# Patient Record
Sex: MALE | Race: WHITE | Employment: FULL TIME | URBAN - METROPOLITAN AREA
[De-identification: names, ages, dates, MRNs, and addresses within clinical notes are randomized per-mention and may not be internally consistent; named-entity substitution may affect disease eponyms.]

---

## 2022-02-06 ENCOUNTER — HOSPITAL ENCOUNTER (INPATIENT)
Age: 47
LOS: 1 days | Discharge: ANOTHER HOSPITAL | DRG: 432 | End: 2022-02-07
Attending: STUDENT IN AN ORGANIZED HEALTH CARE EDUCATION/TRAINING PROGRAM | Admitting: INTERNAL MEDICINE
Payer: COMMERCIAL

## 2022-02-06 DIAGNOSIS — K92.2 GASTROINTESTINAL HEMORRHAGE, UNSPECIFIED GASTROINTESTINAL HEMORRHAGE TYPE: Primary | ICD-10-CM

## 2022-02-06 DIAGNOSIS — K70.30 ALCOHOLIC CIRRHOSIS, UNSPECIFIED WHETHER ASCITES PRESENT (HCC): ICD-10-CM

## 2022-02-06 DIAGNOSIS — R57.8 HEMORRHAGIC SHOCK (HCC): ICD-10-CM

## 2022-02-06 DIAGNOSIS — I85.11 ESOPHAGEAL VARICES WITH BLEEDING IN DISEASES CLASSIFIED ELSEWHERE (HCC): ICD-10-CM

## 2022-02-06 DIAGNOSIS — J98.59 MEDIASTINAL MASS: ICD-10-CM

## 2022-02-06 PROCEDURE — 96376 TX/PRO/DX INJ SAME DRUG ADON: CPT

## 2022-02-06 PROCEDURE — 99283 EMERGENCY DEPT VISIT LOW MDM: CPT

## 2022-02-06 PROCEDURE — 96375 TX/PRO/DX INJ NEW DRUG ADDON: CPT

## 2022-02-06 PROCEDURE — 02HV33Z INSERTION OF INFUSION DEVICE INTO SUPERIOR VENA CAVA, PERCUTANEOUS APPROACH: ICD-10-PCS | Performed by: INTERNAL MEDICINE

## 2022-02-06 PROCEDURE — 96361 HYDRATE IV INFUSION ADD-ON: CPT

## 2022-02-06 PROCEDURE — 96374 THER/PROPH/DIAG INJ IV PUSH: CPT

## 2022-02-06 RX ORDER — ONDANSETRON 2 MG/ML
4 INJECTION INTRAMUSCULAR; INTRAVENOUS ONCE
Status: COMPLETED | OUTPATIENT
Start: 2022-02-07 | End: 2022-02-07

## 2022-02-06 RX ORDER — 0.9 % SODIUM CHLORIDE 0.9 %
1000 INTRAVENOUS SOLUTION INTRAVENOUS ONCE
Status: COMPLETED | OUTPATIENT
Start: 2022-02-07 | End: 2022-02-07

## 2022-02-06 RX ORDER — FENTANYL CITRATE 50 UG/ML
50 INJECTION, SOLUTION INTRAMUSCULAR; INTRAVENOUS ONCE
Status: COMPLETED | OUTPATIENT
Start: 2022-02-07 | End: 2022-02-07

## 2022-02-06 ASSESSMENT — PAIN DESCRIPTION - LOCATION: LOCATION: ABDOMEN

## 2022-02-06 ASSESSMENT — PAIN DESCRIPTION - PAIN TYPE: TYPE: ACUTE PAIN

## 2022-02-06 ASSESSMENT — PAIN SCALES - GENERAL: PAINLEVEL_OUTOF10: 8

## 2022-02-06 ASSESSMENT — PAIN DESCRIPTION - ORIENTATION: ORIENTATION: LOWER

## 2022-02-06 NOTE — Clinical Note
Patient Class: Inpatient [101]   REQUIRED: Diagnosis: GI bleed [[id number]]   Estimated Length of Stay: Estimated stay of more than 2 midnights   Admitting Provider: Stan Crow [[id number]]   Telemetry/Cardiac Monitoring Required?: Yes

## 2022-02-07 ENCOUNTER — ANESTHESIA EVENT (OUTPATIENT)
Dept: OPERATING ROOM | Age: 47
DRG: 432 | End: 2022-02-07
Payer: COMMERCIAL

## 2022-02-07 ENCOUNTER — ANESTHESIA (OUTPATIENT)
Dept: INTERVENTIONAL RADIOLOGY/VASCULAR | Age: 47
DRG: 405 | End: 2022-02-07
Payer: COMMERCIAL

## 2022-02-07 ENCOUNTER — ANESTHESIA (OUTPATIENT)
Dept: OPERATING ROOM | Age: 47
DRG: 432 | End: 2022-02-07
Payer: COMMERCIAL

## 2022-02-07 ENCOUNTER — APPOINTMENT (OUTPATIENT)
Dept: GENERAL RADIOLOGY | Age: 47
DRG: 432 | End: 2022-02-07
Payer: COMMERCIAL

## 2022-02-07 ENCOUNTER — HOSPITAL ENCOUNTER (INPATIENT)
Age: 47
LOS: 15 days | Discharge: HOME HEALTH | DRG: 405 | End: 2022-02-22
Attending: INTERNAL MEDICINE | Admitting: INTERNAL MEDICINE
Payer: COMMERCIAL

## 2022-02-07 ENCOUNTER — APPOINTMENT (OUTPATIENT)
Dept: CT IMAGING | Age: 47
DRG: 432 | End: 2022-02-07
Payer: COMMERCIAL

## 2022-02-07 ENCOUNTER — ANESTHESIA EVENT (OUTPATIENT)
Dept: INTERVENTIONAL RADIOLOGY/VASCULAR | Age: 47
DRG: 405 | End: 2022-02-07
Payer: COMMERCIAL

## 2022-02-07 ENCOUNTER — APPOINTMENT (OUTPATIENT)
Dept: CT IMAGING | Age: 47
DRG: 405 | End: 2022-02-07
Attending: INTERNAL MEDICINE
Payer: COMMERCIAL

## 2022-02-07 ENCOUNTER — APPOINTMENT (OUTPATIENT)
Dept: INTERVENTIONAL RADIOLOGY/VASCULAR | Age: 47
DRG: 405 | End: 2022-02-07
Attending: INTERNAL MEDICINE
Payer: COMMERCIAL

## 2022-02-07 VITALS
TEMPERATURE: 99 F | OXYGEN SATURATION: 100 % | HEIGHT: 68 IN | BODY MASS INDEX: 36.37 KG/M2 | RESPIRATION RATE: 20 BRPM | HEART RATE: 130 BPM | SYSTOLIC BLOOD PRESSURE: 180 MMHG | WEIGHT: 240 LBS | DIASTOLIC BLOOD PRESSURE: 88 MMHG

## 2022-02-07 VITALS
OXYGEN SATURATION: 100 % | RESPIRATION RATE: 12 BRPM | SYSTOLIC BLOOD PRESSURE: 127 MMHG | DIASTOLIC BLOOD PRESSURE: 62 MMHG

## 2022-02-07 VITALS — OXYGEN SATURATION: 99 % | TEMPERATURE: 98.4 F

## 2022-02-07 DIAGNOSIS — I85.01 BLEEDING ESOPHAGEAL VARICES, UNSPECIFIED ESOPHAGEAL VARICES TYPE (HCC): Primary | ICD-10-CM

## 2022-02-07 PROBLEM — R91.8 PULMONARY MASS: Status: ACTIVE | Noted: 2022-02-07

## 2022-02-07 PROBLEM — K92.0 HEMATEMESIS: Status: ACTIVE | Noted: 2022-02-07

## 2022-02-07 PROBLEM — K92.2 GI BLEED: Status: ACTIVE | Noted: 2022-02-07

## 2022-02-07 PROBLEM — F10.10 ALCOHOL ABUSE: Status: ACTIVE | Noted: 2022-02-07

## 2022-02-07 PROBLEM — D62 ACUTE BLOOD LOSS ANEMIA: Status: ACTIVE | Noted: 2022-02-07

## 2022-02-07 PROBLEM — R74.01 TRANSAMINITIS: Status: ACTIVE | Noted: 2022-02-07

## 2022-02-07 PROBLEM — E87.5 HYPERKALEMIA: Status: ACTIVE | Noted: 2022-02-07

## 2022-02-07 PROBLEM — E66.01 MORBID OBESITY WITH BMI OF 40.0-44.9, ADULT (HCC): Status: ACTIVE | Noted: 2022-02-07

## 2022-02-07 LAB
AADO2: 205 MMHG
AADO2: 224.2 MMHG
ABO/RH: NORMAL
ACANTHOCYTES: ABNORMAL
ALBUMIN SERPL-MCNC: 2.3 G/DL (ref 3.5–5.2)
ALBUMIN SERPL-MCNC: 2.5 G/DL (ref 3.5–5.2)
ALBUMIN SERPL-MCNC: 2.8 G/DL (ref 3.5–5.2)
ALBUMIN SERPL-MCNC: 2.9 G/DL (ref 3.5–5.2)
ALP BLD-CCNC: 127 U/L (ref 40–129)
ALP BLD-CCNC: 131 U/L (ref 40–129)
ALP BLD-CCNC: 161 U/L (ref 40–129)
ALP BLD-CCNC: 173 U/L (ref 40–129)
ALT SERPL-CCNC: 202 U/L (ref 0–40)
ALT SERPL-CCNC: 451 U/L (ref 0–40)
ALT SERPL-CCNC: 479 U/L (ref 0–40)
ALT SERPL-CCNC: 63 U/L (ref 0–40)
ANGLE (CLOT STRENGTH): 76 DEGREE (ref 59–74)
ANION GAP SERPL CALCULATED.3IONS-SCNC: 10 MMOL/L (ref 7–16)
ANION GAP SERPL CALCULATED.3IONS-SCNC: 10 MMOL/L (ref 7–16)
ANION GAP SERPL CALCULATED.3IONS-SCNC: 12 MMOL/L (ref 7–16)
ANION GAP SERPL CALCULATED.3IONS-SCNC: 16 MMOL/L (ref 7–16)
ANION GAP SERPL CALCULATED.3IONS-SCNC: 8 MMOL/L (ref 7–16)
ANISOCYTOSIS: ABNORMAL
ANISOCYTOSIS: ABNORMAL
ANTIBODY SCREEN: NORMAL
ANTIBODY SCREEN: NORMAL
APTT: 30.4 SEC (ref 24.5–35.1)
APTT: 30.8 SEC (ref 24.5–35.1)
AST SERPL-CCNC: 1680 U/L (ref 0–39)
AST SERPL-CCNC: 1782 U/L (ref 0–39)
AST SERPL-CCNC: 239 U/L (ref 0–39)
AST SERPL-CCNC: 672 U/L (ref 0–39)
B.E.: -4.8 MMOL/L (ref -3–3)
B.E.: 4.5 MMOL/L (ref -3–3)
BASOPHILS ABSOLUTE: 0.01 E9/L (ref 0–0.2)
BASOPHILS ABSOLUTE: 0.03 E9/L (ref 0–0.2)
BASOPHILS ABSOLUTE: 0.09 E9/L (ref 0–0.2)
BASOPHILS RELATIVE PERCENT: 0.1 % (ref 0–2)
BASOPHILS RELATIVE PERCENT: 0.3 % (ref 0–2)
BASOPHILS RELATIVE PERCENT: 1.3 % (ref 0–2)
BILIRUB SERPL-MCNC: 2.5 MG/DL (ref 0–1.2)
BILIRUB SERPL-MCNC: 3 MG/DL (ref 0–1.2)
BILIRUB SERPL-MCNC: 3.1 MG/DL (ref 0–1.2)
BILIRUB SERPL-MCNC: 3.1 MG/DL (ref 0–1.2)
BILIRUBIN DIRECT: 1.7 MG/DL (ref 0–0.3)
BILIRUBIN, INDIRECT: 0.8 MG/DL (ref 0–1)
BLOOD BANK DISPENSE STATUS: NORMAL
BLOOD BANK PRODUCT CODE: NORMAL
BPU ID: NORMAL
BUN BLDV-MCNC: 10 MG/DL (ref 6–20)
BUN BLDV-MCNC: 11 MG/DL (ref 6–20)
BUN BLDV-MCNC: 16 MG/DL (ref 6–20)
BUN BLDV-MCNC: 7 MG/DL (ref 6–20)
BUN BLDV-MCNC: 9 MG/DL (ref 6–20)
CALCIUM IONIZED: 1.04 MMOL/L (ref 1.15–1.33)
CALCIUM IONIZED: 1.15 MMOL/L (ref 1.15–1.33)
CALCIUM IONIZED: 1.17 MMOL/L (ref 1.15–1.33)
CALCIUM SERPL-MCNC: 7.4 MG/DL (ref 8.6–10.2)
CALCIUM SERPL-MCNC: 7.8 MG/DL (ref 8.6–10.2)
CALCIUM SERPL-MCNC: 8 MG/DL (ref 8.6–10.2)
CALCIUM SERPL-MCNC: 8.2 MG/DL (ref 8.6–10.2)
CALCIUM SERPL-MCNC: 8.3 MG/DL (ref 8.6–10.2)
CHLORIDE BLD-SCNC: 100 MMOL/L (ref 98–107)
CHLORIDE BLD-SCNC: 104 MMOL/L (ref 98–107)
CHLORIDE BLD-SCNC: 105 MMOL/L (ref 98–107)
CHLORIDE BLD-SCNC: 106 MMOL/L (ref 98–107)
CHLORIDE BLD-SCNC: 106 MMOL/L (ref 98–107)
CO2: 19 MMOL/L (ref 22–29)
CO2: 20 MMOL/L (ref 22–29)
CO2: 23 MMOL/L (ref 22–29)
CO2: 23 MMOL/L (ref 22–29)
CO2: 26 MMOL/L (ref 22–29)
COHB: 0.3 % (ref 0–1.5)
COHB: 0.6 % (ref 0–1.5)
CREAT SERPL-MCNC: 0.8 MG/DL (ref 0.7–1.2)
CREAT SERPL-MCNC: 0.8 MG/DL (ref 0.7–1.2)
CREAT SERPL-MCNC: 0.9 MG/DL (ref 0.7–1.2)
CREAT SERPL-MCNC: 0.9 MG/DL (ref 0.7–1.2)
CREAT SERPL-MCNC: 1 MG/DL (ref 0.7–1.2)
CRITICAL: ABNORMAL
CRITICAL: ABNORMAL
DATE ANALYZED: ABNORMAL
DATE ANALYZED: ABNORMAL
DATE OF COLLECTION: ABNORMAL
DATE OF COLLECTION: ABNORMAL
DESCRIPTION BLOOD BANK: NORMAL
EKG ATRIAL RATE: 115 BPM
EKG P AXIS: 50 DEGREES
EKG P-R INTERVAL: 152 MS
EKG Q-T INTERVAL: 332 MS
EKG QRS DURATION: 84 MS
EKG QTC CALCULATION (BAZETT): 459 MS
EKG R AXIS: 8 DEGREES
EKG T AXIS: -33 DEGREES
EKG VENTRICULAR RATE: 115 BPM
EOSINOPHILS ABSOLUTE: 0 E9/L (ref 0.05–0.5)
EOSINOPHILS ABSOLUTE: 0 E9/L (ref 0.05–0.5)
EOSINOPHILS ABSOLUTE: 0.07 E9/L (ref 0.05–0.5)
EOSINOPHILS RELATIVE PERCENT: 0 % (ref 0–6)
EOSINOPHILS RELATIVE PERCENT: 0 % (ref 0–6)
EOSINOPHILS RELATIVE PERCENT: 1 % (ref 0–6)
EPL-TEG: 0 % (ref 0–15)
FIO2: 50 %
FIO2: 50 %
G-TEG: 9.9 K D/SC (ref 4.5–11)
GFR AFRICAN AMERICAN: >60
GFR NON-AFRICAN AMERICAN: >60 ML/MIN/1.73
GLUCOSE BLD-MCNC: 127 MG/DL (ref 74–99)
GLUCOSE BLD-MCNC: 145 MG/DL (ref 74–99)
GLUCOSE BLD-MCNC: 145 MG/DL (ref 74–99)
GLUCOSE BLD-MCNC: 158 MG/DL (ref 74–99)
GLUCOSE BLD-MCNC: 174 MG/DL (ref 74–99)
HCO3: 21 MMOL/L (ref 22–26)
HCO3: 26.1 MMOL/L (ref 22–26)
HCT VFR BLD CALC: 27.7 % (ref 37–54)
HCT VFR BLD CALC: 29.2 % (ref 37–54)
HCT VFR BLD CALC: 29.8 % (ref 37–54)
HCT VFR BLD CALC: 30.8 % (ref 37–54)
HCT VFR BLD CALC: 35.7 % (ref 37–54)
HEMOGLOBIN: 10.2 G/DL (ref 12.5–16.5)
HEMOGLOBIN: 11.6 G/DL (ref 12.5–16.5)
HEMOGLOBIN: 9.2 G/DL (ref 12.5–16.5)
HEMOGLOBIN: 9.8 G/DL (ref 12.5–16.5)
HEMOGLOBIN: 9.9 G/DL (ref 12.5–16.5)
HHB: 3.4 % (ref 0–5)
HHB: 3.8 % (ref 0–5)
HYPOCHROMIA: ABNORMAL
IMMATURE GRANULOCYTES #: 0.04 E9/L
IMMATURE GRANULOCYTES #: 0.05 E9/L
IMMATURE GRANULOCYTES #: 0.06 E9/L
IMMATURE GRANULOCYTES %: 0.6 % (ref 0–5)
INR BLD: 1.5
INR BLD: 1.6
K (CLOTTING TIME): 0.9 MIN (ref 1–3)
LAB: ABNORMAL
LAB: ABNORMAL
LACTIC ACID: 3.2 MMOL/L (ref 0.5–2.2)
LACTIC ACID: 4.2 MMOL/L (ref 0.5–2.2)
LACTIC ACID: 4.3 MMOL/L (ref 0.5–2.2)
LACTIC ACID: 5.6 MMOL/L (ref 0.5–2.2)
LACTIC ACID: 7 MMOL/L (ref 0.5–2.2)
LIPASE: 94 U/L (ref 13–60)
LY30 (FIBRINOLYSIS): 0 % (ref 0–8)
LYMPHOCYTES ABSOLUTE: 0.29 E9/L (ref 1.5–4)
LYMPHOCYTES ABSOLUTE: 0.31 E9/L (ref 1.5–4)
LYMPHOCYTES ABSOLUTE: 1.04 E9/L (ref 1.5–4)
LYMPHOCYTES RELATIVE PERCENT: 14.8 % (ref 20–42)
LYMPHOCYTES RELATIVE PERCENT: 3.2 % (ref 20–42)
LYMPHOCYTES RELATIVE PERCENT: 3.4 % (ref 20–42)
Lab: ABNORMAL
Lab: ABNORMAL
MA (MAX AMPLITUDE): 66.3 MM (ref 50–70)
MAGNESIUM: 1.7 MG/DL (ref 1.6–2.6)
MAGNESIUM: 1.8 MG/DL (ref 1.6–2.6)
MCH RBC QN AUTO: 30.3 PG (ref 26–35)
MCH RBC QN AUTO: 30.4 PG (ref 26–35)
MCH RBC QN AUTO: 30.4 PG (ref 26–35)
MCH RBC QN AUTO: 30.5 PG (ref 26–35)
MCH RBC QN AUTO: 30.9 PG (ref 26–35)
MCHC RBC AUTO-ENTMCNC: 32.5 % (ref 32–34.5)
MCHC RBC AUTO-ENTMCNC: 32.9 % (ref 32–34.5)
MCHC RBC AUTO-ENTMCNC: 33.1 % (ref 32–34.5)
MCHC RBC AUTO-ENTMCNC: 33.2 % (ref 32–34.5)
MCHC RBC AUTO-ENTMCNC: 33.9 % (ref 32–34.5)
MCV RBC AUTO: 91.1 FL (ref 80–99.9)
MCV RBC AUTO: 91.3 FL (ref 80–99.9)
MCV RBC AUTO: 91.7 FL (ref 80–99.9)
MCV RBC AUTO: 92.8 FL (ref 80–99.9)
MCV RBC AUTO: 93.5 FL (ref 80–99.9)
METHB: 0.4 % (ref 0–1.5)
METHB: 0.4 % (ref 0–1.5)
MODE: AC
MODE: AC
MONOCYTES ABSOLUTE: 0.27 E9/L (ref 0.1–0.95)
MONOCYTES ABSOLUTE: 0.61 E9/L (ref 0.1–0.95)
MONOCYTES ABSOLUTE: 0.73 E9/L (ref 0.1–0.95)
MONOCYTES RELATIVE PERCENT: 2.9 % (ref 2–12)
MONOCYTES RELATIVE PERCENT: 8 % (ref 2–12)
MONOCYTES RELATIVE PERCENT: 8.7 % (ref 2–12)
NEUTROPHILS ABSOLUTE: 5.16 E9/L (ref 1.8–7.3)
NEUTROPHILS ABSOLUTE: 7.99 E9/L (ref 1.8–7.3)
NEUTROPHILS ABSOLUTE: 8.57 E9/L (ref 1.8–7.3)
NEUTROPHILS RELATIVE PERCENT: 73.6 % (ref 43–80)
NEUTROPHILS RELATIVE PERCENT: 88.1 % (ref 43–80)
NEUTROPHILS RELATIVE PERCENT: 92.8 % (ref 43–80)
O2 SATURATION: 96.2 % (ref 92–98.5)
O2 SATURATION: 96.6 % (ref 92–98.5)
O2HB: 95.2 % (ref 94–97)
O2HB: 95.9 % (ref 94–97)
OPERATOR ID: 2863
OPERATOR ID: 7221
OVALOCYTES: ABNORMAL
PATIENT TEMP: 37 C
PATIENT TEMP: 37 C
PCO2: 28.6 MMHG (ref 35–45)
PCO2: 41.7 MMHG (ref 35–45)
PDW BLD-RTO: 17.6 FL (ref 11.5–15)
PDW BLD-RTO: 17.7 FL (ref 11.5–15)
PDW BLD-RTO: 18 FL (ref 11.5–15)
PDW BLD-RTO: 18.2 FL (ref 11.5–15)
PDW BLD-RTO: 18.3 FL (ref 11.5–15)
PEEP/CPAP: 8 CMH2O
PEEP/CPAP: 8 CMH2O
PFO2: 1.75 MMHG/%
PFO2: 1.84 MMHG/%
PH BLOOD GAS: 7.32 (ref 7.35–7.45)
PH BLOOD GAS: 7.58 (ref 7.35–7.45)
PHOSPHORUS: 2.6 MG/DL (ref 2.5–4.5)
PHOSPHORUS: 4.6 MG/DL (ref 2.5–4.5)
PLATELET # BLD: 128 E9/L (ref 130–450)
PLATELET # BLD: 141 E9/L (ref 130–450)
PLATELET # BLD: 145 E9/L (ref 130–450)
PLATELET # BLD: 148 E9/L (ref 130–450)
PLATELET # BLD: 161 E9/L (ref 130–450)
PMV BLD AUTO: 10.6 FL (ref 7–12)
PMV BLD AUTO: 11.2 FL (ref 7–12)
PMV BLD AUTO: 11.4 FL (ref 7–12)
PMV BLD AUTO: 11.5 FL (ref 7–12)
PMV BLD AUTO: 11.7 FL (ref 7–12)
PO2: 87.6 MMHG (ref 75–100)
PO2: 92.1 MMHG (ref 75–100)
POIKILOCYTES: ABNORMAL
POIKILOCYTES: ABNORMAL
POLYCHROMASIA: ABNORMAL
POLYCHROMASIA: ABNORMAL
POTASSIUM REFLEX MAGNESIUM: 3.7 MMOL/L (ref 3.5–5)
POTASSIUM REFLEX MAGNESIUM: 5.4 MMOL/L (ref 3.5–5)
POTASSIUM SERPL-SCNC: 4.7 MMOL/L (ref 3.5–5)
POTASSIUM SERPL-SCNC: 5.2 MMOL/L (ref 3.5–5)
POTASSIUM SERPL-SCNC: 6.9 MMOL/L (ref 3.5–5)
POTASSIUM SERPL-SCNC: 7 MMOL/L (ref 3.5–5)
PRO-BNP: 75 PG/ML (ref 0–125)
PROTHROMBIN TIME: 17.2 SEC (ref 9.3–12.4)
PROTHROMBIN TIME: 17.3 SEC (ref 9.3–12.4)
R (REACTION TIME): 3.9 MIN (ref 5–10)
RBC # BLD: 3.04 E12/L (ref 3.8–5.8)
RBC # BLD: 3.2 E12/L (ref 3.8–5.8)
RBC # BLD: 3.21 E12/L (ref 3.8–5.8)
RBC # BLD: 3.36 E12/L (ref 3.8–5.8)
RBC # BLD: 3.82 E12/L (ref 3.8–5.8)
RI(T): 2.23
RI(T): 2.56
RR MECHANICAL: 22 B/MIN
RR MECHANICAL: 22 B/MIN
SARS-COV-2, NAAT: ABNORMAL
SARS-COV-2, NAAT: NOT DETECTED
SARS-COV-2, NAAT: NOT DETECTED
SODIUM BLD-SCNC: 135 MMOL/L (ref 132–146)
SODIUM BLD-SCNC: 136 MMOL/L (ref 132–146)
SODIUM BLD-SCNC: 137 MMOL/L (ref 132–146)
SODIUM BLD-SCNC: 139 MMOL/L (ref 132–146)
SODIUM BLD-SCNC: 141 MMOL/L (ref 132–146)
SOURCE, BLOOD GAS: ABNORMAL
SOURCE, BLOOD GAS: ABNORMAL
TARGET CELLS: ABNORMAL
TARGET CELLS: ABNORMAL
TEAR DROP CELLS: ABNORMAL
THB: 12 G/DL (ref 11.5–16.5)
THB: 9.9 G/DL (ref 11.5–16.5)
TIME ANALYZED: 2335
TIME ANALYZED: 946
TOTAL PROTEIN: 6.4 G/DL (ref 6.4–8.3)
TOTAL PROTEIN: 6.8 G/DL (ref 6.4–8.3)
TOTAL PROTEIN: 7.6 G/DL (ref 6.4–8.3)
TOTAL PROTEIN: 7.6 G/DL (ref 6.4–8.3)
TROPONIN, HIGH SENSITIVITY: 16 NG/L (ref 0–11)
VT MECHANICAL: 500 ML
VT MECHANICAL: 500 ML
WBC # BLD: 7 E9/L (ref 4.5–11.5)
WBC # BLD: 9.1 E9/L (ref 4.5–11.5)
WBC # BLD: 9.2 E9/L (ref 4.5–11.5)
WBC # BLD: 9.2 E9/L (ref 4.5–11.5)
WBC # BLD: 9.6 E9/L (ref 4.5–11.5)

## 2022-02-07 PROCEDURE — 6360000004 HC RX CONTRAST MEDICATION: Performed by: RADIOLOGY

## 2022-02-07 PROCEDURE — 85610 PROTHROMBIN TIME: CPT

## 2022-02-07 PROCEDURE — 2580000003 HC RX 258: Performed by: STUDENT IN AN ORGANIZED HEALTH CARE EDUCATION/TRAINING PROGRAM

## 2022-02-07 PROCEDURE — 36415 COLL VENOUS BLD VENIPUNCTURE: CPT

## 2022-02-07 PROCEDURE — 82805 BLOOD GASES W/O2 SATURATION: CPT

## 2022-02-07 PROCEDURE — 86900 BLOOD TYPING SEROLOGIC ABO: CPT

## 2022-02-07 PROCEDURE — 2500000003 HC RX 250 WO HCPCS: Performed by: NURSE ANESTHETIST, CERTIFIED REGISTERED

## 2022-02-07 PROCEDURE — 87081 CULTURE SCREEN ONLY: CPT

## 2022-02-07 PROCEDURE — 6360000002 HC RX W HCPCS: Performed by: STUDENT IN AN ORGANIZED HEALTH CARE EDUCATION/TRAINING PROGRAM

## 2022-02-07 PROCEDURE — 2500000003 HC RX 250 WO HCPCS: Performed by: STUDENT IN AN ORGANIZED HEALTH CARE EDUCATION/TRAINING PROGRAM

## 2022-02-07 PROCEDURE — P9059 PLASMA, FRZ BETWEEN 8-24HOUR: HCPCS

## 2022-02-07 PROCEDURE — 86850 RBC ANTIBODY SCREEN: CPT

## 2022-02-07 PROCEDURE — 86923 COMPATIBILITY TEST ELECTRIC: CPT

## 2022-02-07 PROCEDURE — 80076 HEPATIC FUNCTION PANEL: CPT

## 2022-02-07 PROCEDURE — 83690 ASSAY OF LIPASE: CPT

## 2022-02-07 PROCEDURE — 2580000003 HC RX 258: Performed by: SURGERY

## 2022-02-07 PROCEDURE — 06L38CZ OCCLUSION OF ESOPHAGEAL VEIN WITH EXTRALUMINAL DEVICE, VIA NATURAL OR ARTIFICIAL OPENING ENDOSCOPIC: ICD-10-PCS | Performed by: SURGERY

## 2022-02-07 PROCEDURE — 6360000002 HC RX W HCPCS: Performed by: RADIOLOGY

## 2022-02-07 PROCEDURE — 43235 EGD DIAGNOSTIC BRUSH WASH: CPT | Performed by: SURGERY

## 2022-02-07 PROCEDURE — 2580000003 HC RX 258: Performed by: PHYSICIAN ASSISTANT

## 2022-02-07 PROCEDURE — 85384 FIBRINOGEN ACTIVITY: CPT

## 2022-02-07 PROCEDURE — 85347 COAGULATION TIME ACTIVATED: CPT

## 2022-02-07 PROCEDURE — 3700000000 HC ANESTHESIA ATTENDED CARE

## 2022-02-07 PROCEDURE — B51T1ZZ FLUOROSCOPY OF PORTAL AND SPLANCHNIC VEINS USING LOW OSMOLAR CONTRAST: ICD-10-PCS | Performed by: INTERNAL MEDICINE

## 2022-02-07 PROCEDURE — 80048 BASIC METABOLIC PNL TOTAL CA: CPT

## 2022-02-07 PROCEDURE — 86901 BLOOD TYPING SEROLOGIC RH(D): CPT

## 2022-02-07 PROCEDURE — 74175 CTA ABDOMEN W/CONTRAST: CPT

## 2022-02-07 PROCEDURE — 7100000001 HC PACU RECOVERY - ADDTL 15 MIN

## 2022-02-07 PROCEDURE — 83880 ASSAY OF NATRIURETIC PEPTIDE: CPT

## 2022-02-07 PROCEDURE — 6360000002 HC RX W HCPCS

## 2022-02-07 PROCEDURE — 85730 THROMBOPLASTIN TIME PARTIAL: CPT

## 2022-02-07 PROCEDURE — 5A1935Z RESPIRATORY VENTILATION, LESS THAN 24 CONSECUTIVE HOURS: ICD-10-PCS | Performed by: STUDENT IN AN ORGANIZED HEALTH CARE EDUCATION/TRAINING PROGRAM

## 2022-02-07 PROCEDURE — 82390 ASSAY OF CERULOPLASMIN: CPT

## 2022-02-07 PROCEDURE — 6360000002 HC RX W HCPCS: Performed by: INTERNAL MEDICINE

## 2022-02-07 PROCEDURE — 6360000002 HC RX W HCPCS: Performed by: SURGERY

## 2022-02-07 PROCEDURE — 94002 VENT MGMT INPAT INIT DAY: CPT

## 2022-02-07 PROCEDURE — 85576 BLOOD PLATELET AGGREGATION: CPT

## 2022-02-07 PROCEDURE — 83605 ASSAY OF LACTIC ACID: CPT

## 2022-02-07 PROCEDURE — 6360000002 HC RX W HCPCS: Performed by: NURSE ANESTHETIST, CERTIFIED REGISTERED

## 2022-02-07 PROCEDURE — 5A1955Z RESPIRATORY VENTILATION, GREATER THAN 96 CONSECUTIVE HOURS: ICD-10-PCS | Performed by: INTERNAL MEDICINE

## 2022-02-07 PROCEDURE — 37799 UNLISTED PX VASCULAR SURGERY: CPT

## 2022-02-07 PROCEDURE — 80053 COMPREHEN METABOLIC PANEL: CPT

## 2022-02-07 PROCEDURE — 2500000003 HC RX 250 WO HCPCS: Performed by: SURGERY

## 2022-02-07 PROCEDURE — 84484 ASSAY OF TROPONIN QUANT: CPT

## 2022-02-07 PROCEDURE — 87635 SARS-COV-2 COVID-19 AMP PRB: CPT

## 2022-02-07 PROCEDURE — 74018 RADEX ABDOMEN 1 VIEW: CPT

## 2022-02-07 PROCEDURE — 85025 COMPLETE CBC W/AUTO DIFF WBC: CPT

## 2022-02-07 PROCEDURE — 1200000000 HC SEMI PRIVATE

## 2022-02-07 PROCEDURE — 2720000010 HC SURG SUPPLY STERILE: Performed by: SURGERY

## 2022-02-07 PROCEDURE — 7100000000 HC PACU RECOVERY - FIRST 15 MIN

## 2022-02-07 PROCEDURE — 43460 PRESSURE TREATMENT ESOPHAGUS: CPT | Performed by: SURGERY

## 2022-02-07 PROCEDURE — 82330 ASSAY OF CALCIUM: CPT

## 2022-02-07 PROCEDURE — 3700000001 HC ADD 15 MINUTES (ANESTHESIA): Performed by: SURGERY

## 2022-02-07 PROCEDURE — 06L38CZ OCCLUSION OF ESOPHAGEAL VEIN WITH EXTRALUMINAL DEVICE, VIA NATURAL OR ARTIFICIAL OPENING ENDOSCOPIC: ICD-10-PCS | Performed by: INTERNAL MEDICINE

## 2022-02-07 PROCEDURE — 2580000003 HC RX 258: Performed by: NURSE ANESTHETIST, CERTIFIED REGISTERED

## 2022-02-07 PROCEDURE — 86920 COMPATIBILITY TEST SPIN: CPT

## 2022-02-07 PROCEDURE — 36620 INSERTION CATHETER ARTERY: CPT

## 2022-02-07 PROCEDURE — C1769 GUIDE WIRE: HCPCS

## 2022-02-07 PROCEDURE — 36556 INSERT NON-TUNNEL CV CATH: CPT

## 2022-02-07 PROCEDURE — 3700000000 HC ANESTHESIA ATTENDED CARE: Performed by: SURGERY

## 2022-02-07 PROCEDURE — C9113 INJ PANTOPRAZOLE SODIUM, VIA: HCPCS | Performed by: STUDENT IN AN ORGANIZED HEALTH CARE EDUCATION/TRAINING PROGRAM

## 2022-02-07 PROCEDURE — 71045 X-RAY EXAM CHEST 1 VIEW: CPT

## 2022-02-07 PROCEDURE — 93005 ELECTROCARDIOGRAM TRACING: CPT | Performed by: SURGERY

## 2022-02-07 PROCEDURE — 6370000000 HC RX 637 (ALT 250 FOR IP): Performed by: STUDENT IN AN ORGANIZED HEALTH CARE EDUCATION/TRAINING PROGRAM

## 2022-02-07 PROCEDURE — 99252 IP/OBS CONSLTJ NEW/EST SF 35: CPT | Performed by: STUDENT IN AN ORGANIZED HEALTH CARE EDUCATION/TRAINING PROGRAM

## 2022-02-07 PROCEDURE — 71275 CT ANGIOGRAPHY CHEST: CPT

## 2022-02-07 PROCEDURE — 84100 ASSAY OF PHOSPHORUS: CPT

## 2022-02-07 PROCEDURE — 99254 IP/OBS CNSLTJ NEW/EST MOD 60: CPT | Performed by: SURGERY

## 2022-02-07 PROCEDURE — 3600007503: Performed by: SURGERY

## 2022-02-07 PROCEDURE — 03HY32Z INSERTION OF MONITORING DEVICE INTO UPPER ARTERY, PERCUTANEOUS APPROACH: ICD-10-PCS | Performed by: INTERNAL MEDICINE

## 2022-02-07 PROCEDURE — 37182 INSERT HEPATIC SHUNT (TIPS): CPT

## 2022-02-07 PROCEDURE — 85027 COMPLETE CBC AUTOMATED: CPT

## 2022-02-07 PROCEDURE — 2580000003 HC RX 258: Performed by: INTERNAL MEDICINE

## 2022-02-07 PROCEDURE — 84132 ASSAY OF SERUM POTASSIUM: CPT

## 2022-02-07 PROCEDURE — 37182 INSERT HEPATIC SHUNT (TIPS): CPT | Performed by: RADIOLOGY

## 2022-02-07 PROCEDURE — 3700000001 HC ADD 15 MINUTES (ANESTHESIA)

## 2022-02-07 PROCEDURE — P9016 RBC LEUKOCYTES REDUCED: HCPCS

## 2022-02-07 PROCEDURE — 6370000000 HC RX 637 (ALT 250 FOR IP): Performed by: SURGERY

## 2022-02-07 PROCEDURE — 83735 ASSAY OF MAGNESIUM: CPT

## 2022-02-07 PROCEDURE — 2000000000 HC ICU R&B

## 2022-02-07 PROCEDURE — 2709999900 HC NON-CHARGEABLE SUPPLY: Performed by: SURGERY

## 2022-02-07 PROCEDURE — 0BH17EZ INSERTION OF ENDOTRACHEAL AIRWAY INTO TRACHEA, VIA NATURAL OR ARTIFICIAL OPENING: ICD-10-PCS | Performed by: STUDENT IN AN ORGANIZED HEALTH CARE EDUCATION/TRAINING PROGRAM

## 2022-02-07 PROCEDURE — 99291 CRITICAL CARE FIRST HOUR: CPT | Performed by: SURGERY

## 2022-02-07 PROCEDURE — 3600007513: Performed by: SURGERY

## 2022-02-07 PROCEDURE — 74174 CTA ABD&PLVS W/CONTRAST: CPT

## 2022-02-07 PROCEDURE — 36430 TRANSFUSION BLD/BLD COMPNT: CPT

## 2022-02-07 PROCEDURE — 74175 CTA ABDOMEN W/CONTRAST: CPT | Performed by: RADIOLOGY

## 2022-02-07 PROCEDURE — C9113 INJ PANTOPRAZOLE SODIUM, VIA: HCPCS | Performed by: INTERNAL MEDICINE

## 2022-02-07 PROCEDURE — 6370000000 HC RX 637 (ALT 250 FOR IP): Performed by: NURSE ANESTHETIST, CERTIFIED REGISTERED

## 2022-02-07 PROCEDURE — 06183J4 BYPASS PORTAL VEIN TO HEPATIC VEIN WITH SYNTHETIC SUBSTITUTE, PERCUTANEOUS APPROACH: ICD-10-PCS | Performed by: INTERNAL MEDICINE

## 2022-02-07 PROCEDURE — 99291 CRITICAL CARE FIRST HOUR: CPT | Performed by: THORACIC SURGERY (CARDIOTHORACIC VASCULAR SURGERY)

## 2022-02-07 RX ORDER — FENTANYL CITRATE 50 UG/ML
25 INJECTION, SOLUTION INTRAMUSCULAR; INTRAVENOUS EVERY 5 MIN PRN
Status: DISCONTINUED | OUTPATIENT
Start: 2022-02-07 | End: 2022-02-07 | Stop reason: HOSPADM

## 2022-02-07 RX ORDER — HEPARIN SODIUM 10000 [USP'U]/ML
INJECTION, SOLUTION INTRAVENOUS; SUBCUTANEOUS
Status: COMPLETED | OUTPATIENT
Start: 2022-02-07 | End: 2022-02-07

## 2022-02-07 RX ORDER — SODIUM CHLORIDE 0.9 % (FLUSH) 0.9 %
10 SYRINGE (ML) INJECTION PRN
Status: DISCONTINUED | OUTPATIENT
Start: 2022-02-07 | End: 2022-02-07

## 2022-02-07 RX ORDER — DEXTROSE MONOHYDRATE 50 MG/ML
100 INJECTION, SOLUTION INTRAVENOUS PRN
Status: DISCONTINUED | OUTPATIENT
Start: 2022-02-07 | End: 2022-02-22 | Stop reason: HOSPADM

## 2022-02-07 RX ORDER — LABETALOL HYDROCHLORIDE 5 MG/ML
5 INJECTION, SOLUTION INTRAVENOUS EVERY 10 MIN PRN
Status: DISCONTINUED | OUTPATIENT
Start: 2022-02-07 | End: 2022-02-07 | Stop reason: HOSPADM

## 2022-02-07 RX ORDER — HYDROCODONE BITARTRATE AND ACETAMINOPHEN 5; 325 MG/1; MG/1
1 TABLET ORAL
Status: DISCONTINUED | OUTPATIENT
Start: 2022-02-07 | End: 2022-02-07 | Stop reason: HOSPADM

## 2022-02-07 RX ORDER — PROPOFOL 10 MG/ML
INJECTION, EMULSION INTRAVENOUS
Status: COMPLETED
Start: 2022-02-07 | End: 2022-02-07

## 2022-02-07 RX ORDER — PROPOFOL 10 MG/ML
INJECTION, EMULSION INTRAVENOUS PRN
Status: DISCONTINUED | OUTPATIENT
Start: 2022-02-07 | End: 2022-02-07 | Stop reason: SDUPTHER

## 2022-02-07 RX ORDER — PANTOPRAZOLE SODIUM 40 MG/10ML
80 INJECTION, POWDER, LYOPHILIZED, FOR SOLUTION INTRAVENOUS ONCE
Status: COMPLETED | OUTPATIENT
Start: 2022-02-07 | End: 2022-02-07

## 2022-02-07 RX ORDER — NICOTINE POLACRILEX 4 MG
15 LOZENGE BUCCAL PRN
Status: DISCONTINUED | OUTPATIENT
Start: 2022-02-07 | End: 2022-02-22 | Stop reason: HOSPADM

## 2022-02-07 RX ORDER — DEXAMETHASONE SODIUM PHOSPHATE 4 MG/ML
INJECTION, SOLUTION INTRA-ARTICULAR; INTRALESIONAL; INTRAMUSCULAR; INTRAVENOUS; SOFT TISSUE PRN
Status: DISCONTINUED | OUTPATIENT
Start: 2022-02-07 | End: 2022-02-07 | Stop reason: SDUPTHER

## 2022-02-07 RX ORDER — POTASSIUM CHLORIDE 7.45 MG/ML
10 INJECTION INTRAVENOUS PRN
Status: CANCELLED | OUTPATIENT
Start: 2022-02-07

## 2022-02-07 RX ORDER — SODIUM CHLORIDE 9 MG/ML
INJECTION, SOLUTION INTRAVENOUS PRN
Status: DISCONTINUED | OUTPATIENT
Start: 2022-02-07 | End: 2022-02-07

## 2022-02-07 RX ORDER — SODIUM CHLORIDE, SODIUM LACTATE, POTASSIUM CHLORIDE, CALCIUM CHLORIDE 600; 310; 30; 20 MG/100ML; MG/100ML; MG/100ML; MG/100ML
INJECTION, SOLUTION INTRAVENOUS CONTINUOUS
Status: DISCONTINUED | OUTPATIENT
Start: 2022-02-07 | End: 2022-02-07

## 2022-02-07 RX ORDER — PHENOBARBITAL SODIUM 65 MG/ML
130 INJECTION INTRAMUSCULAR EVERY 6 HOURS
Status: DISCONTINUED | OUTPATIENT
Start: 2022-02-08 | End: 2022-02-09

## 2022-02-07 RX ORDER — FOLIC ACID 5 MG/ML
1 INJECTION, SOLUTION INTRAMUSCULAR; INTRAVENOUS; SUBCUTANEOUS DAILY
Status: DISCONTINUED | OUTPATIENT
Start: 2022-02-07 | End: 2022-02-14

## 2022-02-07 RX ORDER — ONDANSETRON 2 MG/ML
4 INJECTION INTRAMUSCULAR; INTRAVENOUS ONCE
Status: COMPLETED | OUTPATIENT
Start: 2022-02-07 | End: 2022-02-07

## 2022-02-07 RX ORDER — LACTULOSE 10 G/15ML
20 SOLUTION ORAL 3 TIMES DAILY
Status: DISCONTINUED | OUTPATIENT
Start: 2022-02-07 | End: 2022-02-08

## 2022-02-07 RX ORDER — POLYETHYLENE GLYCOL 3350 17 G/17G
17 POWDER, FOR SOLUTION ORAL DAILY
Status: DISCONTINUED | OUTPATIENT
Start: 2022-02-07 | End: 2022-02-07

## 2022-02-07 RX ORDER — ACETAMINOPHEN 650 MG/1
650 SUPPOSITORY RECTAL EVERY 6 HOURS PRN
Status: CANCELLED | OUTPATIENT
Start: 2022-02-07

## 2022-02-07 RX ORDER — THIAMINE HYDROCHLORIDE 100 MG/ML
100 INJECTION, SOLUTION INTRAMUSCULAR; INTRAVENOUS ONCE
Status: DISCONTINUED | OUTPATIENT
Start: 2022-02-07 | End: 2022-02-07 | Stop reason: HOSPADM

## 2022-02-07 RX ORDER — PROCHLORPERAZINE EDISYLATE 5 MG/ML
5 INJECTION INTRAMUSCULAR; INTRAVENOUS
Status: DISCONTINUED | OUTPATIENT
Start: 2022-02-07 | End: 2022-02-07 | Stop reason: HOSPADM

## 2022-02-07 RX ORDER — FENTANYL CITRATE 50 UG/ML
50 INJECTION, SOLUTION INTRAMUSCULAR; INTRAVENOUS ONCE
Status: COMPLETED | OUTPATIENT
Start: 2022-02-07 | End: 2022-02-07

## 2022-02-07 RX ORDER — ACETAMINOPHEN 325 MG/1
650 TABLET ORAL EVERY 6 HOURS PRN
Status: CANCELLED | OUTPATIENT
Start: 2022-02-07

## 2022-02-07 RX ORDER — SODIUM CHLORIDE 0.9 % (FLUSH) 0.9 %
10 SYRINGE (ML) INJECTION EVERY 12 HOURS SCHEDULED
Status: DISCONTINUED | OUTPATIENT
Start: 2022-02-07 | End: 2022-02-22 | Stop reason: HOSPADM

## 2022-02-07 RX ORDER — DEXTROSE MONOHYDRATE 25 G/50ML
25 INJECTION, SOLUTION INTRAVENOUS ONCE
Status: COMPLETED | OUTPATIENT
Start: 2022-02-07 | End: 2022-02-07

## 2022-02-07 RX ORDER — POTASSIUM CHLORIDE 20 MEQ/1
40 TABLET, EXTENDED RELEASE ORAL PRN
Status: DISCONTINUED | OUTPATIENT
Start: 2022-02-07 | End: 2022-02-10

## 2022-02-07 RX ORDER — SODIUM CHLORIDE 9 MG/ML
INJECTION, SOLUTION INTRAVENOUS PRN
Status: DISCONTINUED | OUTPATIENT
Start: 2022-02-07 | End: 2022-02-07 | Stop reason: HOSPADM

## 2022-02-07 RX ORDER — POTASSIUM CHLORIDE 20 MEQ/1
40 TABLET, EXTENDED RELEASE ORAL PRN
Status: CANCELLED | OUTPATIENT
Start: 2022-02-07

## 2022-02-07 RX ORDER — SODIUM CHLORIDE 9 MG/ML
INJECTION, SOLUTION INTRAVENOUS CONTINUOUS PRN
Status: DISCONTINUED | OUTPATIENT
Start: 2022-02-07 | End: 2022-02-07 | Stop reason: SDUPTHER

## 2022-02-07 RX ORDER — FENTANYL CITRATE 50 UG/ML
INJECTION, SOLUTION INTRAMUSCULAR; INTRAVENOUS PRN
Status: DISCONTINUED | OUTPATIENT
Start: 2022-02-07 | End: 2022-02-07 | Stop reason: SDUPTHER

## 2022-02-07 RX ORDER — ONDANSETRON 2 MG/ML
4 INJECTION INTRAMUSCULAR; INTRAVENOUS EVERY 6 HOURS PRN
Status: DISCONTINUED | OUTPATIENT
Start: 2022-02-07 | End: 2022-02-09

## 2022-02-07 RX ORDER — FOLIC ACID 5 MG/ML
1 INJECTION, SOLUTION INTRAMUSCULAR; INTRAVENOUS; SUBCUTANEOUS ONCE
Status: COMPLETED | OUTPATIENT
Start: 2022-02-07 | End: 2022-02-07

## 2022-02-07 RX ORDER — PHENOBARBITAL SODIUM 65 MG/ML
260 INJECTION INTRAMUSCULAR EVERY 6 HOURS
Status: COMPLETED | OUTPATIENT
Start: 2022-02-07 | End: 2022-02-08

## 2022-02-07 RX ORDER — MIDAZOLAM HYDROCHLORIDE 1 MG/ML
INJECTION INTRAMUSCULAR; INTRAVENOUS PRN
Status: DISCONTINUED | OUTPATIENT
Start: 2022-02-07 | End: 2022-02-07 | Stop reason: SDUPTHER

## 2022-02-07 RX ORDER — ROCURONIUM BROMIDE 10 MG/ML
INJECTION, SOLUTION INTRAVENOUS PRN
Status: DISCONTINUED | OUTPATIENT
Start: 2022-02-07 | End: 2022-02-07 | Stop reason: SDUPTHER

## 2022-02-07 RX ORDER — POLYVINYL ALCOHOL 14 MG/ML
1 SOLUTION/ DROPS OPHTHALMIC EVERY 4 HOURS
Status: DISCONTINUED | OUTPATIENT
Start: 2022-02-07 | End: 2022-02-11

## 2022-02-07 RX ORDER — FUROSEMIDE 10 MG/ML
40 INJECTION INTRAMUSCULAR; INTRAVENOUS ONCE
Status: COMPLETED | OUTPATIENT
Start: 2022-02-07 | End: 2022-02-07

## 2022-02-07 RX ORDER — ACETAMINOPHEN 325 MG/1
650 TABLET ORAL EVERY 6 HOURS PRN
Status: DISCONTINUED | OUTPATIENT
Start: 2022-02-07 | End: 2022-02-08

## 2022-02-07 RX ORDER — HYDRALAZINE HYDROCHLORIDE 20 MG/ML
5 INJECTION INTRAMUSCULAR; INTRAVENOUS EVERY 10 MIN PRN
Status: DISCONTINUED | OUTPATIENT
Start: 2022-02-07 | End: 2022-02-07 | Stop reason: HOSPADM

## 2022-02-07 RX ORDER — DEXTROSE MONOHYDRATE 25 G/50ML
12.5 INJECTION, SOLUTION INTRAVENOUS PRN
Status: DISCONTINUED | OUTPATIENT
Start: 2022-02-07 | End: 2022-02-22 | Stop reason: HOSPADM

## 2022-02-07 RX ORDER — VECURONIUM BROMIDE 1 MG/ML
INJECTION, POWDER, LYOPHILIZED, FOR SOLUTION INTRAVENOUS PRN
Status: DISCONTINUED | OUTPATIENT
Start: 2022-02-07 | End: 2022-02-07 | Stop reason: SDUPTHER

## 2022-02-07 RX ORDER — PHENYLEPHRINE HCL IN 0.9% NACL 1 MG/10 ML
SYRINGE (ML) INTRAVENOUS PRN
Status: DISCONTINUED | OUTPATIENT
Start: 2022-02-07 | End: 2022-02-07 | Stop reason: SDUPTHER

## 2022-02-07 RX ORDER — POTASSIUM CHLORIDE 7.45 MG/ML
10 INJECTION INTRAVENOUS PRN
Status: DISCONTINUED | OUTPATIENT
Start: 2022-02-07 | End: 2022-02-10

## 2022-02-07 RX ORDER — PROPOFOL 10 MG/ML
5-50 INJECTION, EMULSION INTRAVENOUS
Status: DISCONTINUED | OUTPATIENT
Start: 2022-02-07 | End: 2022-02-10

## 2022-02-07 RX ORDER — PANTOPRAZOLE SODIUM 40 MG/10ML
40 INJECTION, POWDER, LYOPHILIZED, FOR SOLUTION INTRAVENOUS 2 TIMES DAILY
Status: DISCONTINUED | OUTPATIENT
Start: 2022-02-07 | End: 2022-02-17

## 2022-02-07 RX ORDER — ACETAMINOPHEN 650 MG/1
650 SUPPOSITORY RECTAL EVERY 6 HOURS PRN
Status: DISCONTINUED | OUTPATIENT
Start: 2022-02-07 | End: 2022-02-08

## 2022-02-07 RX ORDER — MINERAL OIL AND WHITE PETROLATUM 150; 830 MG/G; MG/G
OINTMENT OPHTHALMIC EVERY 4 HOURS
Status: DISCONTINUED | OUTPATIENT
Start: 2022-02-07 | End: 2022-02-11

## 2022-02-07 RX ORDER — SODIUM CHLORIDE 9 MG/ML
25 INJECTION, SOLUTION INTRAVENOUS PRN
Status: DISCONTINUED | OUTPATIENT
Start: 2022-02-07 | End: 2022-02-22 | Stop reason: HOSPADM

## 2022-02-07 RX ORDER — SODIUM CHLORIDE 9 MG/ML
25 INJECTION, SOLUTION INTRAVENOUS PRN
Status: CANCELLED | OUTPATIENT
Start: 2022-02-07

## 2022-02-07 RX ORDER — LIDOCAINE HYDROCHLORIDE 20 MG/ML
INJECTION, SOLUTION EPIDURAL; INFILTRATION; INTRACAUDAL; PERINEURAL PRN
Status: DISCONTINUED | OUTPATIENT
Start: 2022-02-07 | End: 2022-02-07 | Stop reason: SDUPTHER

## 2022-02-07 RX ORDER — OCTREOTIDE ACETATE 50 UG/ML
50 INJECTION, SOLUTION INTRAVENOUS; SUBCUTANEOUS ONCE
Status: COMPLETED | OUTPATIENT
Start: 2022-02-07 | End: 2022-02-07

## 2022-02-07 RX ORDER — SODIUM CHLORIDE 9 MG/ML
25 INJECTION, SOLUTION INTRAVENOUS PRN
Status: DISCONTINUED | OUTPATIENT
Start: 2022-02-07 | End: 2022-02-07

## 2022-02-07 RX ORDER — DIPHENHYDRAMINE HYDROCHLORIDE 50 MG/ML
12.5 INJECTION INTRAMUSCULAR; INTRAVENOUS
Status: DISCONTINUED | OUTPATIENT
Start: 2022-02-07 | End: 2022-02-07 | Stop reason: HOSPADM

## 2022-02-07 RX ORDER — SODIUM CHLORIDE 0.9 % (FLUSH) 0.9 %
10 SYRINGE (ML) INJECTION PRN
Status: CANCELLED | OUTPATIENT
Start: 2022-02-07

## 2022-02-07 RX ORDER — ONDANSETRON 4 MG/1
4 TABLET, ORALLY DISINTEGRATING ORAL EVERY 8 HOURS PRN
Status: DISCONTINUED | OUTPATIENT
Start: 2022-02-07 | End: 2022-02-09

## 2022-02-07 RX ORDER — SODIUM CHLORIDE 0.9 % (FLUSH) 0.9 %
10 SYRINGE (ML) INJECTION PRN
Status: DISCONTINUED | OUTPATIENT
Start: 2022-02-07 | End: 2022-02-22 | Stop reason: HOSPADM

## 2022-02-07 RX ORDER — CEFTRIAXONE 1 G/1
INJECTION, POWDER, FOR SOLUTION INTRAMUSCULAR; INTRAVENOUS
Status: COMPLETED
Start: 2022-02-07 | End: 2022-02-07

## 2022-02-07 RX ORDER — SODIUM CHLORIDE 0.9 % (FLUSH) 0.9 %
10 SYRINGE (ML) INJECTION EVERY 12 HOURS SCHEDULED
Status: DISCONTINUED | OUTPATIENT
Start: 2022-02-07 | End: 2022-02-07

## 2022-02-07 RX ORDER — CALCIUM CHLORIDE 100 MG/ML
1000 INJECTION INTRAVENOUS; INTRAVENTRICULAR ONCE
Status: DISCONTINUED | OUTPATIENT
Start: 2022-02-07 | End: 2022-02-07 | Stop reason: SDUPTHER

## 2022-02-07 RX ORDER — PANTOPRAZOLE SODIUM 40 MG/10ML
40 INJECTION, POWDER, LYOPHILIZED, FOR SOLUTION INTRAVENOUS EVERY 6 HOURS
Status: DISCONTINUED | OUTPATIENT
Start: 2022-02-07 | End: 2022-02-07 | Stop reason: HOSPADM

## 2022-02-07 RX ORDER — CHLORHEXIDINE GLUCONATE 0.12 MG/ML
15 RINSE ORAL 2 TIMES DAILY
Status: DISCONTINUED | OUTPATIENT
Start: 2022-02-07 | End: 2022-02-22 | Stop reason: HOSPADM

## 2022-02-07 RX ORDER — THIAMINE HYDROCHLORIDE 100 MG/ML
100 INJECTION, SOLUTION INTRAMUSCULAR; INTRAVENOUS DAILY
Status: DISCONTINUED | OUTPATIENT
Start: 2022-02-07 | End: 2022-02-08

## 2022-02-07 RX ORDER — PANTOPRAZOLE SODIUM 40 MG/1
40 TABLET, DELAYED RELEASE ORAL
Status: DISCONTINUED | OUTPATIENT
Start: 2022-02-07 | End: 2022-02-07 | Stop reason: CLARIF

## 2022-02-07 RX ORDER — SODIUM CHLORIDE 0.9 % (FLUSH) 0.9 %
10 SYRINGE (ML) INJECTION EVERY 12 HOURS SCHEDULED
Status: CANCELLED | OUTPATIENT
Start: 2022-02-07

## 2022-02-07 RX ORDER — SUCCINYLCHOLINE/SOD CL,ISO/PF 200MG/10ML
SYRINGE (ML) INTRAVENOUS PRN
Status: DISCONTINUED | OUTPATIENT
Start: 2022-02-07 | End: 2022-02-07 | Stop reason: SDUPTHER

## 2022-02-07 RX ORDER — DEXTROSE MONOHYDRATE 25 G/50ML
INJECTION, SOLUTION INTRAVENOUS PRN
Status: DISCONTINUED | OUTPATIENT
Start: 2022-02-07 | End: 2022-02-07 | Stop reason: SDUPTHER

## 2022-02-07 RX ORDER — MEPERIDINE HYDROCHLORIDE 25 MG/ML
12.5 INJECTION INTRAMUSCULAR; INTRAVENOUS; SUBCUTANEOUS EVERY 5 MIN PRN
Status: DISCONTINUED | OUTPATIENT
Start: 2022-02-07 | End: 2022-02-07 | Stop reason: HOSPADM

## 2022-02-07 RX ADMIN — FENTANYL CITRATE 50 MCG: 0.05 INJECTION, SOLUTION INTRAMUSCULAR; INTRAVENOUS at 00:17

## 2022-02-07 RX ADMIN — PANTOPRAZOLE SODIUM 80 MG: 40 INJECTION, POWDER, FOR SOLUTION INTRAVENOUS at 00:43

## 2022-02-07 RX ADMIN — MINERAL OIL AND WHITE PETROLATUM: 150; 830 OINTMENT OPHTHALMIC at 18:29

## 2022-02-07 RX ADMIN — MINERAL OIL AND WHITE PETROLATUM: 150; 830 OINTMENT OPHTHALMIC at 21:48

## 2022-02-07 RX ADMIN — SODIUM CHLORIDE 1000 ML: 9 INJECTION, SOLUTION INTRAVENOUS at 00:07

## 2022-02-07 RX ADMIN — PANTOPRAZOLE SODIUM 40 MG: 40 INJECTION, POWDER, FOR SOLUTION INTRAVENOUS at 22:16

## 2022-02-07 RX ADMIN — FENTANYL CITRATE 100 MCG: 50 INJECTION, SOLUTION INTRAMUSCULAR; INTRAVENOUS at 08:57

## 2022-02-07 RX ADMIN — ROCURONIUM BROMIDE 50 MG: 10 INJECTION INTRAVENOUS at 04:02

## 2022-02-07 RX ADMIN — FENTANYL CITRATE 50 MCG: 50 INJECTION, SOLUTION INTRAMUSCULAR; INTRAVENOUS at 00:57

## 2022-02-07 RX ADMIN — DEXTROSE MONOHYDRATE 25 G: 25 INJECTION, SOLUTION INTRAVENOUS at 08:00

## 2022-02-07 RX ADMIN — ROCURONIUM BROMIDE 10 MG: 10 INJECTION INTRAVENOUS at 03:16

## 2022-02-07 RX ADMIN — LACTULOSE 20 G: 20 SOLUTION ORAL at 23:42

## 2022-02-07 RX ADMIN — IOPAMIDOL 100 ML: 755 INJECTION, SOLUTION INTRAVENOUS at 01:21

## 2022-02-07 RX ADMIN — PHENOBARBITAL SODIUM 260 MG: 65 INJECTION INTRAMUSCULAR at 21:49

## 2022-02-07 RX ADMIN — VECURONIUM BROMIDE FOR INJECTION 5 MG: 1 INJECTION, POWDER, LYOPHILIZED, FOR SOLUTION INTRAVENOUS at 08:27

## 2022-02-07 RX ADMIN — PROPOFOL 40 MCG/KG/MIN: 10 INJECTION, EMULSION INTRAVENOUS at 18:38

## 2022-02-07 RX ADMIN — ONDANSETRON 4 MG: 2 INJECTION INTRAMUSCULAR; INTRAVENOUS at 00:16

## 2022-02-07 RX ADMIN — CHLORHEXIDINE GLUCONATE 0.12% ORAL RINSE 15 ML: 1.2 LIQUID ORAL at 20:27

## 2022-02-07 RX ADMIN — ROCURONIUM BROMIDE 30 MG: 10 INJECTION INTRAVENOUS at 03:45

## 2022-02-07 RX ADMIN — ROCURONIUM BROMIDE 10 MG: 10 INJECTION INTRAVENOUS at 03:20

## 2022-02-07 RX ADMIN — SODIUM CHLORIDE, POTASSIUM CHLORIDE, SODIUM LACTATE AND CALCIUM CHLORIDE: 600; 310; 30; 20 INJECTION, SOLUTION INTRAVENOUS at 06:28

## 2022-02-07 RX ADMIN — Medication 200 MCG: at 02:27

## 2022-02-07 RX ADMIN — CHLORHEXIDINE GLUCONATE 0.12% ORAL RINSE 15 ML: 1.2 LIQUID ORAL at 10:32

## 2022-02-07 RX ADMIN — MINERAL OIL AND WHITE PETROLATUM: 150; 830 OINTMENT OPHTHALMIC at 14:20

## 2022-02-07 RX ADMIN — INSULIN HUMAN 10 UNITS: 100 INJECTION, SOLUTION PARENTERAL at 08:43

## 2022-02-07 RX ADMIN — POLYVINYL ALCOHOL 1 DROP: 14 SOLUTION/ DROPS OPHTHALMIC at 20:26

## 2022-02-07 RX ADMIN — OCTREOTIDE ACETATE 50 MCG: 50 INJECTION, SOLUTION INTRAVENOUS; SUBCUTANEOUS at 01:17

## 2022-02-07 RX ADMIN — LIDOCAINE HYDROCHLORIDE 100 MG: 20 INJECTION, SOLUTION EPIDURAL; INFILTRATION; INTRACAUDAL; PERINEURAL at 02:19

## 2022-02-07 RX ADMIN — VECURONIUM BROMIDE FOR INJECTION 10 MG: 1 INJECTION, POWDER, LYOPHILIZED, FOR SOLUTION INTRAVENOUS at 07:12

## 2022-02-07 RX ADMIN — Medication 200 MCG: at 02:30

## 2022-02-07 RX ADMIN — CALCIUM CHLORIDE 1000 MG: 100 INJECTION INTRAVENOUS; INTRAVENTRICULAR at 10:39

## 2022-02-07 RX ADMIN — SODIUM BICARBONATE 50 MEQ: 84 INJECTION, SOLUTION INTRAVENOUS at 08:00

## 2022-02-07 RX ADMIN — POLYVINYL ALCOHOL 1 DROP: 14 SOLUTION/ DROPS OPHTHALMIC at 23:43

## 2022-02-07 RX ADMIN — FENTANYL CITRATE 50 MCG: 50 INJECTION, SOLUTION INTRAMUSCULAR; INTRAVENOUS at 05:09

## 2022-02-07 RX ADMIN — FOLIC ACID 1 MG: 5 INJECTION, SOLUTION INTRAMUSCULAR; INTRAVENOUS; SUBCUTANEOUS at 12:34

## 2022-02-07 RX ADMIN — FOLIC ACID 1 MG: 5 INJECTION, SOLUTION INTRAMUSCULAR; INTRAVENOUS; SUBCUTANEOUS at 01:01

## 2022-02-07 RX ADMIN — PHENOBARBITAL SODIUM 260 MG: 65 INJECTION INTRAMUSCULAR at 17:21

## 2022-02-07 RX ADMIN — IOPAMIDOL 180 ML: 755 INJECTION, SOLUTION INTRAVENOUS at 10:02

## 2022-02-07 RX ADMIN — THIAMINE HYDROCHLORIDE 100 MG: 100 INJECTION, SOLUTION INTRAMUSCULAR; INTRAVENOUS at 10:33

## 2022-02-07 RX ADMIN — POLYVINYL ALCOHOL 1 DROP: 14 SOLUTION/ DROPS OPHTHALMIC at 10:49

## 2022-02-07 RX ADMIN — Medication 10 UNITS: at 08:00

## 2022-02-07 RX ADMIN — SODIUM CHLORIDE: 9 INJECTION, SOLUTION INTRAVENOUS at 02:38

## 2022-02-07 RX ADMIN — PANTOPRAZOLE SODIUM 40 MG: 40 INJECTION, POWDER, FOR SOLUTION INTRAVENOUS at 10:32

## 2022-02-07 RX ADMIN — PROPOFOL 30 MCG/KG/MIN: 10 INJECTION, EMULSION INTRAVENOUS at 06:27

## 2022-02-07 RX ADMIN — PHYTONADIONE 10 MG: 10 INJECTION, EMULSION INTRAMUSCULAR; INTRAVENOUS; SUBCUTANEOUS at 04:38

## 2022-02-07 RX ADMIN — FENTANYL CITRATE 100 MCG: 50 INJECTION, SOLUTION INTRAMUSCULAR; INTRAVENOUS at 04:10

## 2022-02-07 RX ADMIN — Medication 5000 UNITS: at 08:50

## 2022-02-07 RX ADMIN — Medication 100 MCG/HR: at 06:26

## 2022-02-07 RX ADMIN — PROPOFOL 40 MCG/KG/MIN: 10 INJECTION, EMULSION INTRAVENOUS at 23:42

## 2022-02-07 RX ADMIN — FENTANYL CITRATE 100 MCG: 50 INJECTION, SOLUTION INTRAMUSCULAR; INTRAVENOUS at 02:19

## 2022-02-07 RX ADMIN — PROPOFOL 50 MCG/KG/MIN: 10 INJECTION, EMULSION INTRAVENOUS at 12:36

## 2022-02-07 RX ADMIN — Medication 200 MCG: at 02:44

## 2022-02-07 RX ADMIN — FUROSEMIDE 40 MG: 10 INJECTION, SOLUTION INTRAMUSCULAR; INTRAVENOUS at 16:41

## 2022-02-07 RX ADMIN — POLYVINYL ALCOHOL 1 DROP: 14 SOLUTION/ DROPS OPHTHALMIC at 12:42

## 2022-02-07 RX ADMIN — SODIUM BICARBONATE 50 MEQ: 84 INJECTION, SOLUTION INTRAVENOUS at 08:43

## 2022-02-07 RX ADMIN — OCTREOTIDE ACETATE 50 MCG/HR: 500 INJECTION, SOLUTION INTRAVENOUS; SUBCUTANEOUS at 01:20

## 2022-02-07 RX ADMIN — SODIUM BICARBONATE: 84 INJECTION, SOLUTION INTRAVENOUS at 12:33

## 2022-02-07 RX ADMIN — PHENOBARBITAL SODIUM 260 MG: 65 INJECTION INTRAMUSCULAR at 10:33

## 2022-02-07 RX ADMIN — FENTANYL CITRATE 100 MCG: 50 INJECTION, SOLUTION INTRAMUSCULAR; INTRAVENOUS at 03:31

## 2022-02-07 RX ADMIN — Medication 10 ML: at 21:49

## 2022-02-07 RX ADMIN — Medication 140 MG: at 02:19

## 2022-02-07 RX ADMIN — Medication 10 ML: at 10:50

## 2022-02-07 RX ADMIN — WATER 1000 MG: 1 INJECTION INTRAMUSCULAR; INTRAVENOUS; SUBCUTANEOUS at 20:49

## 2022-02-07 RX ADMIN — POLYVINYL ALCOHOL 1 DROP: 14 SOLUTION/ DROPS OPHTHALMIC at 17:17

## 2022-02-07 RX ADMIN — PROPOFOL 150 MG: 10 INJECTION, EMULSION INTRAVENOUS at 02:19

## 2022-02-07 RX ADMIN — Medication 125 MCG/HR: at 15:57

## 2022-02-07 RX ADMIN — WATER 1000 MG: 1 INJECTION INTRAMUSCULAR; INTRAVENOUS; SUBCUTANEOUS at 02:31

## 2022-02-07 RX ADMIN — DEXAMETHASONE SODIUM PHOSPHATE 10 MG: 4 INJECTION, SOLUTION INTRAMUSCULAR; INTRAVENOUS at 02:45

## 2022-02-07 RX ADMIN — MIDAZOLAM 2 MG: 1 INJECTION INTRAMUSCULAR; INTRAVENOUS at 02:16

## 2022-02-07 RX ADMIN — ONDANSETRON 4 MG: 2 INJECTION INTRAMUSCULAR; INTRAVENOUS at 01:16

## 2022-02-07 RX ADMIN — IOPAMIDOL 90 ML: 755 INJECTION, SOLUTION INTRAVENOUS at 06:59

## 2022-02-07 RX ADMIN — MINERAL OIL AND WHITE PETROLATUM: 150; 830 OINTMENT OPHTHALMIC at 10:32

## 2022-02-07 RX ADMIN — Medication 200 MCG: at 02:29

## 2022-02-07 RX ADMIN — CEFTRIAXONE SODIUM: 1 INJECTION, POWDER, FOR SOLUTION INTRAMUSCULAR; INTRAVENOUS at 02:51

## 2022-02-07 RX ADMIN — DEXTROSE MONOHYDRATE 12.5 G: 25 INJECTION, SOLUTION INTRAVENOUS at 08:43

## 2022-02-07 RX ADMIN — VECURONIUM BROMIDE FOR INJECTION 5 MG: 1 INJECTION, POWDER, LYOPHILIZED, FOR SOLUTION INTRAVENOUS at 07:54

## 2022-02-07 RX ADMIN — PROPOFOL 50 MCG/KG/MIN: 10 INJECTION, EMULSION INTRAVENOUS at 09:55

## 2022-02-07 RX ADMIN — SODIUM BICARBONATE: 84 INJECTION, SOLUTION INTRAVENOUS at 22:11

## 2022-02-07 RX ADMIN — SODIUM CHLORIDE: 9 INJECTION, SOLUTION INTRAVENOUS at 02:16

## 2022-02-07 ASSESSMENT — PULMONARY FUNCTION TESTS
PIF_VALUE: 25
PIF_VALUE: 35
PIF_VALUE: 25
PIF_VALUE: 33
PIF_VALUE: 22
PIF_VALUE: 37
PIF_VALUE: 31
PIF_VALUE: 30
PIF_VALUE: 30
PIF_VALUE: 25
PIF_VALUE: 30
PIF_VALUE: 30
PIF_VALUE: 25
PIF_VALUE: 30
PIF_VALUE: 28
PIF_VALUE: 28
PIF_VALUE: 30
PIF_VALUE: 31
PIF_VALUE: 29
PIF_VALUE: 35
PIF_VALUE: 34
PIF_VALUE: 18
PIF_VALUE: 28
PIF_VALUE: 31
PIF_VALUE: 30
PIF_VALUE: 30
PIF_VALUE: 29
PIF_VALUE: 30
PIF_VALUE: 0
PIF_VALUE: 25
PIF_VALUE: 29
PIF_VALUE: 29
PIF_VALUE: 30
PIF_VALUE: 1
PIF_VALUE: 35
PIF_VALUE: 35
PIF_VALUE: 30
PIF_VALUE: 25
PIF_VALUE: 30
PIF_VALUE: 35
PIF_VALUE: 1
PIF_VALUE: 30
PIF_VALUE: 34
PIF_VALUE: 29
PIF_VALUE: 29
PIF_VALUE: 30
PIF_VALUE: 28
PIF_VALUE: 30
PIF_VALUE: 30
PIF_VALUE: 35
PIF_VALUE: 25
PIF_VALUE: 34
PIF_VALUE: 34
PIF_VALUE: 21
PIF_VALUE: 23
PIF_VALUE: 34
PIF_VALUE: 33
PIF_VALUE: 7
PIF_VALUE: 35
PIF_VALUE: 30
PIF_VALUE: 26
PIF_VALUE: 28
PIF_VALUE: 35
PIF_VALUE: 30
PIF_VALUE: 37
PIF_VALUE: 33
PIF_VALUE: 28
PIF_VALUE: 35
PIF_VALUE: 30
PIF_VALUE: 27
PIF_VALUE: 30
PIF_VALUE: 35
PIF_VALUE: 28
PIF_VALUE: 30
PIF_VALUE: 25
PIF_VALUE: 30
PIF_VALUE: 30
PIF_VALUE: 26
PIF_VALUE: 30
PIF_VALUE: 35
PIF_VALUE: 30
PIF_VALUE: 35
PIF_VALUE: 25
PIF_VALUE: 30
PIF_VALUE: 30
PIF_VALUE: 27
PIF_VALUE: 25
PIF_VALUE: 21
PIF_VALUE: 30
PIF_VALUE: 35
PIF_VALUE: 27
PIF_VALUE: 26
PIF_VALUE: 29
PIF_VALUE: 30
PIF_VALUE: 31
PIF_VALUE: 30
PIF_VALUE: 25
PIF_VALUE: 35
PIF_VALUE: 30
PIF_VALUE: 35
PIF_VALUE: 27
PIF_VALUE: 35
PIF_VALUE: 30
PIF_VALUE: 31
PIF_VALUE: 30
PIF_VALUE: 30
PIF_VALUE: 31
PIF_VALUE: 25
PIF_VALUE: 35
PIF_VALUE: 30
PIF_VALUE: 30
PIF_VALUE: 28
PIF_VALUE: 3
PIF_VALUE: 36
PIF_VALUE: 33
PIF_VALUE: 35
PIF_VALUE: 30
PIF_VALUE: 25
PIF_VALUE: 30
PIF_VALUE: 2
PIF_VALUE: 30
PIF_VALUE: 26
PIF_VALUE: 27
PIF_VALUE: 2
PIF_VALUE: 1
PIF_VALUE: 28
PIF_VALUE: 30
PIF_VALUE: 27
PIF_VALUE: 28
PIF_VALUE: 30
PIF_VALUE: 27
PIF_VALUE: 0
PIF_VALUE: 26
PIF_VALUE: 30
PIF_VALUE: 28
PIF_VALUE: 31
PIF_VALUE: 30
PIF_VALUE: 27
PIF_VALUE: 35
PIF_VALUE: 29
PIF_VALUE: 30
PIF_VALUE: 35
PIF_VALUE: 30
PIF_VALUE: 22
PIF_VALUE: 27
PIF_VALUE: 27
PIF_VALUE: 30
PIF_VALUE: 33
PIF_VALUE: 28
PIF_VALUE: 30
PIF_VALUE: 30
PIF_VALUE: 25
PIF_VALUE: 30
PIF_VALUE: 30
PIF_VALUE: 35
PIF_VALUE: 30
PIF_VALUE: 27
PIF_VALUE: 30
PIF_VALUE: 30
PIF_VALUE: 35
PIF_VALUE: 29
PIF_VALUE: 30
PIF_VALUE: 0
PIF_VALUE: 35
PIF_VALUE: 30
PIF_VALUE: 35
PIF_VALUE: 22
PIF_VALUE: 30
PIF_VALUE: 28
PIF_VALUE: 30
PIF_VALUE: 27
PIF_VALUE: 30
PIF_VALUE: 35
PIF_VALUE: 31
PIF_VALUE: 25
PIF_VALUE: 30
PIF_VALUE: 26
PIF_VALUE: 34
PIF_VALUE: 30
PIF_VALUE: 26
PIF_VALUE: 28
PIF_VALUE: 30
PIF_VALUE: 30
PIF_VALUE: 28
PIF_VALUE: 30
PIF_VALUE: 35
PIF_VALUE: 28
PIF_VALUE: 30
PIF_VALUE: 3
PIF_VALUE: 22
PIF_VALUE: 30
PIF_VALUE: 35
PIF_VALUE: 30
PIF_VALUE: 30
PIF_VALUE: 29
PIF_VALUE: 0
PIF_VALUE: 29
PIF_VALUE: 30
PIF_VALUE: 28
PIF_VALUE: 30
PIF_VALUE: 28
PIF_VALUE: 30
PIF_VALUE: 34
PIF_VALUE: 30
PIF_VALUE: 29
PIF_VALUE: 1
PIF_VALUE: 32
PIF_VALUE: 30
PIF_VALUE: 14
PIF_VALUE: 35
PIF_VALUE: 7
PIF_VALUE: 35
PIF_VALUE: 25
PIF_VALUE: 28
PIF_VALUE: 37
PIF_VALUE: 30
PIF_VALUE: 26
PIF_VALUE: 30
PIF_VALUE: 25
PIF_VALUE: 30
PIF_VALUE: 25
PIF_VALUE: 30
PIF_VALUE: 30
PIF_VALUE: 25
PIF_VALUE: 30
PIF_VALUE: 27
PIF_VALUE: 21
PIF_VALUE: 30
PIF_VALUE: 34
PIF_VALUE: 35
PIF_VALUE: 27
PIF_VALUE: 34
PIF_VALUE: 30
PIF_VALUE: 25
PIF_VALUE: 35
PIF_VALUE: 26
PIF_VALUE: 26
PIF_VALUE: 30
PIF_VALUE: 25
PIF_VALUE: 35
PIF_VALUE: 30
PIF_VALUE: 27
PIF_VALUE: 29
PIF_VALUE: 27
PIF_VALUE: 25
PIF_VALUE: 35
PIF_VALUE: 30
PIF_VALUE: 33
PIF_VALUE: 30
PIF_VALUE: 14
PIF_VALUE: 25

## 2022-02-07 ASSESSMENT — ENCOUNTER SYMPTOMS
DIARRHEA: 0
COUGH: 0
ABDOMINAL PAIN: 1
SORE THROAT: 0
SHORTNESS OF BREATH: 0
SINUS PRESSURE: 0
NAUSEA: 1
EYE PAIN: 0
BACK PAIN: 0
EYE DISCHARGE: 0
VOMITING: 1
EYE REDNESS: 0
WHEEZING: 0

## 2022-02-07 ASSESSMENT — PAIN SCALES - GENERAL
PAINLEVEL_OUTOF10: 0
PAINLEVEL_OUTOF10: 2
PAINLEVEL_OUTOF10: 0
PAINLEVEL_OUTOF10: 3
PAINLEVEL_OUTOF10: 0
PAINLEVEL_OUTOF10: 10
PAINLEVEL_OUTOF10: 0
PAINLEVEL_OUTOF10: 1
PAINLEVEL_OUTOF10: 0

## 2022-02-07 ASSESSMENT — LIFESTYLE VARIABLES
SMOKING_STATUS: 0
SMOKING_STATUS: 0

## 2022-02-07 NOTE — CONSULTS
GENERAL SURGERY  CONSULT NOTE  2/7/2022    Physician Consulted: Dr. Suni Zamudio  Reason for Consult: GI bleed  Referring Physician: Dr. Georgette LAYNE  Aydin Danielle is a 42 y.o. male with history of alcohol abuse who presents to [address] [place] [address] with GI hemorrhage. He initially presented to [facility] with abdominal pain, nausea, and hematemesis. He was diaphoretic, pale, and reportedly appeared to be in distress. He was given 1 L fluid and 2 units of trauma blood. He was started on a Protonix drip. He underwent EGD with gI team where he was noted to have bleeding esophageal varices. Banding was attempted 14 times and the patient was still bleeding. A blakemore tube was inserted and the patient was transferred to [address] [place] [address] for TIPS procedure and advanced GI services.      Patient reportedly drinks at least 1 glass of vodka a day for the past 10 years. He experiences withdrawal symptoms if he does not drink everyday. He denied history of varices or liver disease/cirrhosis. He is not on any anticoagulation    No past medical history on file. No past surgical history on file. Medications Prior to Admission:    Prior to Admission medications    Not on File       No Known Allergies    No family history on file. Social History     Tobacco Use    Smoking status: Not on file    Smokeless tobacco: Not on file   Substance Use Topics    Alcohol use: Not on file    Drug use: Not on file         Review of Systems- unable to obtain. Patient is intubated and sedated      PHYSICAL EXAM:    Vitals:    02/07/22 0718   BP: 118/70   Pulse: 114   Resp: 16   Temp:    SpO2: 99%       General Appearance: intubated, sedated  Skin:  Skin color, texture, turgor normal. No rashes or lesions. Head/face:  NCAT  Lungs:  AC/VC PEEP 10 Fio2 100%  Heart:  Tachycardic, normotensive  Abdomen:  Soft, distended  Extremities: Extremities warm to touch, pink, with no edema.     LABS:    CBC  Recent Labs 02/07/22  0610   WBC 9.2   HGB 11.6*   HCT 35.7*        BMP  Recent Labs     02/07/22  0610      K 7.0*      CO2 20*   BUN 9   CREATININE 0.9   CALCIUM 7.4*     Liver Function  Recent Labs     02/07/22  0008 02/07/22  0008 02/07/22  0610   LIPASE 94*  --   --    BILITOT 2.5*   < > 3.1*   BILIDIR 1.7*  --   --    *   < > 672*   ALT 63*   < > 202*   ALKPHOS 173*   < > 161*   PROT 7.6   < > 7.6   LABALBU 2.8*   < > 2.9*    < > = values in this interval not displayed. No results for input(s): LACTATE in the last 72 hours. Recent Labs     02/07/22  0610   INR 1.5       RADIOLOGY    XR ABDOMEN (KUB) (SINGLE AP VIEW)    Result Date: 2/7/2022  EXAMINATION: ONE SUPINE XRAY VIEW(S) OF THE ABDOMEN 2/7/2022 12:27 am COMPARISON: None. HISTORY: ORDERING SYSTEM PROVIDED HISTORY: nausea ? free air TECHNOLOGIST PROVIDED HISTORY: Reason for exam:->nausea ? free air FINDINGS: No free air identified. Patient is semi upright rather than upright which limits evaluation for free air. Visualized gas pattern is unremarkable. No free air seen although patient is semi upright rather than upright which limits evaluation. XR CHEST PORTABLE    Result Date: 2/7/2022  EXAMINATION: ONE XRAY VIEW OF THE CHEST 2/7/2022 12:18 am COMPARISON: None. HISTORY: ORDERING SYSTEM PROVIDED HISTORY: nausea, vomiting TECHNOLOGIST PROVIDED HISTORY: Reason for exam:->nausea, vomiting FINDINGS: The lungs are without acute focal process. There is no effusion or pneumothorax. Heart size upper limits of normal.  The osseous structures are without acute process. No acute process. CTA CHEST W CONTRAST    Result Date: 2/7/2022  EXAMINATION: CTA OF THE CHEST 2/7/2022 1:32 am TECHNIQUE: CTA of the chest was performed after the administration of intravenous contrast.  Multiplanar reformatted images are provided for review. MIP images are provided for review.  Dose modulation, iterative reconstruction, and/or weight based adjustment of the mA/kV was utilized to reduce the radiation dose to as low as reasonably achievable. COMPARISON: None. HISTORY: ORDERING SYSTEM PROVIDED HISTORY: abd pain r/o dissection TECHNOLOGIST PROVIDED HISTORY: Reason for exam:->abd pain r/o dissection Decision Support Exception - unselect if not a suspected or confirmed emergency medical condition->Emergency Medical Condition (MA) FINDINGS: Pulmonary Arteries/aorta: There is no thoracic aortic aneurysm. There is a linear shadow through the anterior margin of ascending aorta and proximal arch which is probably due to pulsations. It is difficult to completely exclude aortic dissection in this region. No pulmonary embolus seen. Mediastinum: No evidence of mediastinal lymphadenopathy. The heart and pericardium demonstrate no acute abnormality. Lungs/pleura: There is an intrathoracic mass in left paraspinal region aseptically abutting the lower thoracic spine and descending aorta. It measures 3.7 x 3.9 x 4.7 cm. Malignancy not excluded. This could be a pleural based lesion or may represent a neurogenic mass. There is no acute infiltrate. There is no pleural effusion or pneumothorax. Upper Abdomen: Images through the upper abdomen demonstrate hepatic steatosis. Soft Tissues/Bones: No acute bone or soft tissue abnormality. 1. There is a linear shadow through the anterior margin of ascending aorta and proximal aortic arch which is probably artifactual from pulsations although it is difficult to completely exclude a subtle aortic dissection. There is no evidence of dissection involving the distal arch or descending thoracic aorta. 2. No pulmonary embolus seen. 3. There is a 3.7 x 3.9 x 4.7 cm mass in the posteromedial lower left hemithorax, paraspinal region abutting descending thoracic aorta. Malignancy not excluded. This this could be a pleural base mass or mass of neurogenic origin. 4. Hepatic steatosis.      CTA ABDOMEN PELVIS W CONTRAST    Result Date: 2/7/2022  EXAMINATION: CTA OF THE ABDOMEN AND PELVIS WITH CONTRAST 2/7/2022 1:32 am: TECHNIQUE: CTA of the abdomen and pelvis was performed with the administration of intravenous contrast. Multiplanar reformatted images are provided for review. MIP images are provided for review. Dose modulation, iterative reconstruction, and/or weight based adjustment of the mA/kV was utilized to reduce the radiation dose to as low as reasonably achievable. COMPARISON: None. HISTORY: ORDERING SYSTEM PROVIDED HISTORY: abd pain TECHNOLOGIST PROVIDED HISTORY: Reason for exam:->abd pain Decision Support Exception - unselect if not a suspected or confirmed emergency medical condition->Emergency Medical Condition (MA) FINDINGS: CTA ABDOMEN: Intact abdominal aorta and its major branches with no aneurysm or dissection. .  An approximately 3.7 x 3.7 cm masslike lesion in left posterior mediastinum. Please refer to the report of CTA of the chest for details. Marked diffuse hepatic steatosis. Incompletely distended gallbladder. No splenomegaly. Unremarkable pancreas, bilateral kidneys, and adrenals. Normal appendix. Bowel loops nonobstructed. Distal colonic diverticulosis. No acute diverticulitis. Trace ascites. No free air. No adenopathy. No acute osseous abnormality. CTA PELVIS: Intact pelvic arteries with no aneurysm or dissection. Small fat containing left inguinal hernia. Trace pelvic ascites. Normal sized prostate. Urinary bladder grossly unremarkable. No acute osseous abnormality. Intact abdominal and pelvic arteries with no aneurysm or dissection. A rounded masslike lesion in the left posterior mediastinum. Please refer to the report of CTA of the chest for details. Marked diffuse hepatic steatosis. Normal appendix. Distal colonic diverticulosis. No acute diverticulitis. Trace ascites. ASSESSMENT:  42 y.o. male with GI hemorrhage s/p esophageal banding x 14 and blakemore tube placement.     PLAN:  Keep Blakemore tube in place. Dr. Heath Carrier consulted. Monitor HgB and transfuse as necessary to maintain HgB > 7. NPO.  IVF.  PPI. Octreotide drip. TIPS procedure with IR this AM.    Will discuss with Dr. Raciel Machuca.     Electronically signed by Stefanie Bower MD on 2/7/22 at 7:35 AM EST    As above  Discussed with SICU Attending  Esophageal balloon has been decompressed since TIPS postop with no active bleeding or drop in hgb  Deflated gastric balloon now monitor for bleeding recurrence  Keeping SB tube in place until confident bleeding has tamponade- defer to GI and SICU for timing of removal  Will defer repeat  Endoscopy to GI if needs repeat banding as I know some of bands place this AM were dislodged with SB tube placement  Appreciate efforts of all consultants  Will follow  Family updated     Rachel Lipscomb MD, MS  Minimally Invasive and Bariatric Surgery  [phone] (p)  2/7/2022  4:22 PM

## 2022-02-07 NOTE — OP NOTE
Operative Note      Patient: Celi Soliman  YOB: 1975  MRN: [id number]    Date of Procedure: 2/7/2022    Pre-Op Diagnosis: GI BLEED    Post-Op Diagnosis: Bleeding esophageal varices       Procedure(s):  EGD ESOPHAGOGASTRODUODENOSCOPY ESOPHAGEAL BANDING    Surgeon(s):  MD ROMAN Kimble MD    Assistant:   * No surgical staff found *    Anesthesia: General    Estimated Blood Loss (mL): 5707    Complications: None    Specimens:   * No specimens in log *    Implants:  * No implants in log *      Drains: * No LDAs found *    Consent:   Informed consent was obtained from the patient including and not limited to risk of perforation, aspiration of gastric contents or teeth, bleeding, infection, dental breakage, ileus, need for surgery, or worst case death. Patient was brought to OR and sedated with general anesthesia and intubated for airway protection. Endoscope was advanced easily through mouth to second portion of duodenum. Large amounts of fresh blood and clotted blood seen throughout. Large clot encountered in the stomach, limiting visualization of the mucosa. Large varices with red dusty marks encountered in the distal esophagus, with active bleeding. Fresh blood seen in the proximal duodenum, but easily cleared/washed with no source identified in the duodenum. No obvious source identified in the stomach, but cannot rule out gastric varices or gastric ulcer. In an attempt to better visualize gastric mucosa, patient was repositioned in the right lateral recumbent position, but still with difficulty dislodging the large clot, and visualizing underlying mucosa. Oropharynx views are limited but grossly normal.    Esophagus:  Four large column varices encountered. 14 bands placed, but several (at least 4-5) seen dislodged, upon reinspection. Bleeding continued despite these bands.  Ultimately, Blakemore tube placed with the aid of Dr. Raciel Machuca, please see his note for details. Stomach:   Antrum is normal, with fresh and old blood, no obvious ulcers. Gastric body views are limited due to presence of clot. Retroflexed views show limited view of fundus and cardia, similar to above. Cannot rule out gastric varices. Duodenum:  Bulb is normal.  Second portion of duodenum is normal.  Some fresh and old blood seen in the duodenum, but no source identified there; blood was likely from stomach or esophagus. IMPRESSION AND PLAN:     1. Continued active GI hemorrhage, likely from esophageal varices, but cannot rule out gastric varices, less likely gastric ulcerations, though visualization is limited due to the presence of a large clot. 14 bands placed, with continued active bleeding noted, 4-5 bands dislodged during procedure. Ultimately, Blakemore tube placed with the aid of Dr. An Bergman, please see his note for more details. Recommendations: Patient to remain intubated and for stat transfer to [facility] for emergent TIPS procedure for variceal bleeding. Maintain Blakemore tube and set to continuous suction pending transfer. Continue to transfuse blood for active GI bleeding and hypotension. Continue ocreotide and protonix. Pt was seen and procedure was performed with Dr. Emily Hardwick present for the entire procedure    Initial attempts at banding to control hemorrhage were unsuccessful despite 14 bands being placed, see above note. There was no injection sclerosing agents available at the hospital so I made the decision to place a Blakemore tube as I felt the bleeding was likely due to non visualized gastric varices (see above note).      Electronically signed by Gisell King DO on 2/7/2022 at 4:09 AM

## 2022-02-07 NOTE — ED NOTES
To CT scan via cart, when trying to lay flat for CT, vomited copious amounts bright red blood. Scan cancelled, returned toED room. Pt reports feeling better after emesis.      [facility]  02/07/22 4942

## 2022-02-07 NOTE — PLAN OF CARE
Problem: Falls - Risk of:  Goal: Will remain free from falls  Description: Will remain free from falls  Outcome: Met This Shift  Goal: Absence of physical injury  Description: Absence of physical injury  Outcome: Met This Shift     Problem: Falls - Risk of:  Goal: Will remain free from falls  Description: Will remain free from falls  Outcome: Met This Shift  Goal: Absence of physical injury  Description: Absence of physical injury  Outcome: Met This Shift

## 2022-02-07 NOTE — PROCEDURES
Jenniffer Vaughan is a 42 y.o. male patient. No diagnosis found. No past medical history on file. Blood pressure 130/89, pulse 112, resp. rate 22, SpO2 100 %. Insert Arterial Line    Date/Time: 2/7/2022 6:28 AM  Performed by: Tahmina Teran DO  Authorized by: Tahmina Teran DO   Consent: The procedure was performed in an emergent situation. Patient identity confirmed: hospital-assigned identification number  Time out: Immediately prior to procedure a \"time out\" was called to verify the correct patient, procedure, equipment, support staff and site/side marked as required. Preparation: Patient was prepped and draped in the usual sterile fashion.   Indications: multiple ABGs, respiratory failure and hemodynamic monitoring  Location: right radial    Sedation:  Patient sedated: no    Ramon's test normal: yes  Needle gauge: 18  Seldinger technique: Seldinger technique used  Number of attempts: 1  Post-procedure: line sutured  Post-procedure CMS: normal  Patient tolerance: patient tolerated the procedure well with no immediate complications          Tahmina Teran DO  2/7/2022

## 2022-02-07 NOTE — ED NOTES
Xray in for pcxr,  Being prepared for central line insertion     [facility]  02/07/22 6815

## 2022-02-07 NOTE — CONSULTS
[facility]  Dr. Al Virgen M.D., Dr. Adonay Bond M.D., BALWINDER Gaitan.O., Dr. Brittanie Adams M.D., AUDIE OsmanO. Patient Name: Josiane Villagomez  MRN: [id number]  : 1975 (42 y.o. male)  Allergies: has No Known Allergies. Date of Service: 2022       Reason for Consultation:  Hematemesis    HISTORY OF PRESENT ILLNESS:      The patient is a 42 y.o. male who presents with hematemesis. He reports that these symptoms started about two hours ago, he vomited bright red blood at home and presented to the ER, where he continued to have vivienne hematemesis. He has never had this before. He denies hematochezia or melena. He has recently started taking omeprazole because a few weeks ago he had one dark stool and some abdominal discomfort. Currently he is complaining of abdominal discomfort, and nausea. He then had a large volume of vivienne hematemesis, bright red blood, filling about half the emesis bag he was holding. After he vomited, he said he was feeling more comfortable. He has never had endoscopy or colonoscopy in the past. He did have COVID about one month ago. He is not currently on any steroids, anticoagulants, or antiplatelets. He denies taking NSAIDs. He denies tobacco or illicit drug use. He does drink 3 glasses of Vodka daily    REVIEW OF SYSTEMS:   Review of Systems   12 point ROS obtained and negative except as in HPI    Past Medical History:  No past medical history on file. Past Surgical History:  No past surgical history on file. Home Medications:  Prior to Admission medications    Not on File       Allergies: Patient has no known allergies.     Social History:  Social History     Socioeconomic History    Marital status:      Spouse name: Not on file    Number of children: Not on file    Years of education: Not on file    Highest education level: Not on file   Occupational History    Not on file   Tobacco Use    Smoking status: Not on file    Smokeless tobacco: Not on file   Substance and Sexual Activity    Alcohol use: Not on file    Drug use: Not on file    Sexual activity: Not on file   Other Topics Concern    Not on file   Social History Narrative    Not on file     Social Determinants of Health     Financial Resource Strain:     Difficulty of Paying Living Expenses: Not on file   Food Insecurity:     Worried About Running Out of Food in the Last Year: Not on file    Shai of Food in the Last Year: Not on file   Transportation Needs:     Lack of Transportation (Medical): Not on file    Lack of Transportation (Non-Medical): Not on file   Physical Activity:     Days of Exercise per Week: Not on file    Minutes of Exercise per Session: Not on file   Stress:     Feeling of Stress : Not on file   Social Connections:     Frequency of Communication with Friends and Family: Not on file    Frequency of Social Gatherings with Friends and Family: Not on file    Attends Baptism Services: Not on file    Active Member of [address] [place] [address] [employer] or Organizations: Not on file    Attends Club or Organization Meetings: Not on file    Marital Status: Not on file   Intimate Partner Violence:     Fear of Current or Ex-Partner: Not on file    Emotionally Abused: Not on file    Physically Abused: Not on file    Sexually Abused: Not on file   Housing Stability:     Unable to Pay for Housing in the Last Year: Not on file    Number of [place] in the Last Year: Not on file    Unstable Housing in the Last Year: Not on file       Family History:  No family history on file.       PHYSICAL EXAM:  Vital Signs: /71   Pulse 93   Temp 99 °F (37.2 °C) (Oral)   Resp 20   Ht 5' 8\" (1.727 m)   Wt 240 lb (108.9 kg)   SpO2 95%   BMI 36.49 kg/m²   GENERAL APPEARANCE:  awake, alert, oriented, cooperative, in moderate distress, actively vomiting bright red blood  EYES:  Lids and lashes normal, sclera clear  HENT:  Normocephalic, without obvious abnormality  LUNGS:  Clear to auscultation bilaterally. Tachypneic  CARDIOVASCULAR: Tachycardic  ABDOMEN:  normal bowel sounds in all 4 quadrants, firm, not rigid, mildly distended, nontender  MUSCULOSKELETAL:  There is no redness, warmth, or swelling of the joints. EXTREMITIES: No edema  NEUROLOGIC:  Awake, alert, oriented to name, place and time. SKIN: Normal skin color, no rashes  PSYCH: Affect, behavior and insight are all within normal limits.       DATA:  Results for orders placed or performed during the hospital encounter of 02/06/22   COVID-19, Rapid    Specimen: Nasopharyngeal Swab   Result Value Ref Range    SARS-CoV-2, NAAT Not Detected Not Detected   CBC Auto Differential   Result Value Ref Range    WBC 7.0 4.5 - 11.5 E9/L    RBC 3.21 (L) 3.80 - 5.80 E12/L    Hemoglobin 9.8 (L) 12.5 - 16.5 g/dL    Hematocrit 29.8 (L) 37.0 - 54.0 %    MCV 92.8 80.0 - 99.9 fL    MCH 30.5 26.0 - 35.0 pg    MCHC 32.9 32.0 - 34.5 %    RDW 18.2 (H) 11.5 - 15.0 fL    Platelets 406 901 - 746 E9/L    MPV 10.6 7.0 - 12.0 fL    Neutrophils % 73.6 43.0 - 80.0 %    Immature Granulocytes % 0.6 0.0 - 5.0 %    Lymphocytes % 14.8 (L) 20.0 - 42.0 %    Monocytes % 8.7 2.0 - 12.0 %    Eosinophils % 1.0 0.0 - 6.0 %    Basophils % 1.3 0.0 - 2.0 %    Neutrophils Absolute 5.16 1.80 - 7.30 E9/L    Immature Granulocytes # 0.04 E9/L    Lymphocytes Absolute 1.04 (L) 1.50 - 4.00 E9/L    Monocytes Absolute 0.61 0.10 - 0.95 E9/L    Eosinophils Absolute 0.07 0.05 - 0.50 E9/L    Basophils Absolute 0.09 0.00 - 0.20 M3/C   Basic Metabolic Panel w/ Reflex to MG   Result Value Ref Range    Sodium 135 132 - 146 mmol/L    Potassium reflex Magnesium 3.7 3.5 - 5.0 mmol/L    Chloride 100 98 - 107 mmol/L    CO2 19 (L) 22 - 29 mmol/L    Anion Gap 16 7 - 16 mmol/L    Glucose 145 (H) 74 - 99 mg/dL    BUN 7 6 - 20 mg/dL    CREATININE 1.0 0.7 - 1.2 mg/dL    GFR Non-African American >60 >=60 mL/min/1.73    GFR African American >60     Calcium 8.3 (L) 8.6 - 10.2 mg/dL Hepatic Function Panel   Result Value Ref Range    Total Protein 7.6 6.4 - 8.3 g/dL    Albumin 2.8 (L) 3.5 - 5.2 g/dL    Alkaline Phosphatase 173 (H) 40 - 129 U/L    ALT 63 (H) 0 - 40 U/L     (H) 0 - 39 U/L    Total Bilirubin 2.5 (H) 0.0 - 1.2 mg/dL    Bilirubin, Direct 1.7 (H) 0.0 - 0.3 mg/dL    Bilirubin, Indirect 0.8 0.0 - 1.0 mg/dL   Lipase   Result Value Ref Range    Lipase 94 (H) 13 - 60 U/L   Troponin   Result Value Ref Range    Troponin, High Sensitivity 16 (H) 0 - 11 ng/L   Brain Natriuretic Peptide   Result Value Ref Range    Pro-BNP 75 0 - 125 pg/mL   Protime-INR   Result Value Ref Range    Protime 17.2 (H) 9.3 - 12.4 sec    INR 1.6    APTT   Result Value Ref Range    aPTT 30.8 24.5 - 35.1 sec   Lactic Acid, Plasma   Result Value Ref Range    Lactic Acid 7.0 (HH) 0.5 - 2.2 mmol/L   TYPE AND SCREEN   Result Value Ref Range    ABO/Rh O POS     Antibody Screen NEG    PREPARE RBC (CROSSMATCH)   Result Value Ref Range    Product Code Blood Bank [id number]     Description Blood Bank Red Blood Cells, Leuko-reduced     Unit Number [id number]     Dispense Status Blood Bank issued     Product Code Blood Bank [id number]     Description Blood Bank Red Blood Cells, Apheresis, Leuko-reduced     Unit Number [id number]     Dispense Status Blood Bank issued    PREPARE FRESH FROZEN PLASMA   Result Value Ref Range    Product Code Blood Bank [id number]     Description Blood Bank Plasma, Apheresis, 5 Day, Thawed     Unit Number [id number]     Dispense Status Blood Bank issued          IMAGING:  XR ABDOMEN (KUB) (SINGLE AP VIEW)    Result Date: 2/7/2022  EXAMINATION: ONE SUPINE XRAY VIEW(S) OF THE ABDOMEN 2/7/2022 12:27 am COMPARISON: None. HISTORY: ORDERING SYSTEM PROVIDED HISTORY: nausea ? free air TECHNOLOGIST PROVIDED HISTORY: Reason for exam:->nausea ? free air FINDINGS: No free air identified. Patient is semi upright rather than upright which limits evaluation for free air.   Visualized gas pattern is unremarkable. No free air seen although patient is semi upright rather than upright which limits evaluation. XR CHEST PORTABLE    Result Date: 2/7/2022  EXAMINATION: ONE XRAY VIEW OF THE CHEST 2/7/2022 12:18 am COMPARISON: None. HISTORY: ORDERING SYSTEM PROVIDED HISTORY: nausea, vomiting TECHNOLOGIST PROVIDED HISTORY: Reason for exam:->nausea, vomiting FINDINGS: The lungs are without acute focal process. There is no effusion or pneumothorax. Heart size upper limits of normal.  The osseous structures are without acute process. No acute process. CTA CHEST W CONTRAST    Result Date: 2/7/2022  EXAMINATION: CTA OF THE CHEST 2/7/2022 1:32 am TECHNIQUE: CTA of the chest was performed after the administration of intravenous contrast.  Multiplanar reformatted images are provided for review. MIP images are provided for review. Dose modulation, iterative reconstruction, and/or weight based adjustment of the mA/kV was utilized to reduce the radiation dose to as low as reasonably achievable. COMPARISON: None. HISTORY: ORDERING SYSTEM PROVIDED HISTORY: abd pain r/o dissection TECHNOLOGIST PROVIDED HISTORY: Reason for exam:->abd pain r/o dissection Decision Support Exception - unselect if not a suspected or confirmed emergency medical condition->Emergency Medical Condition (MA) FINDINGS: Pulmonary Arteries/aorta: There is no thoracic aortic aneurysm. There is a linear shadow through the anterior margin of ascending aorta and proximal arch which is probably due to pulsations. It is difficult to completely exclude aortic dissection in this region. No pulmonary embolus seen. Mediastinum: No evidence of mediastinal lymphadenopathy. The heart and pericardium demonstrate no acute abnormality. Lungs/pleura: There is an intrathoracic mass in left paraspinal region aseptically abutting the lower thoracic spine and descending aorta. It measures 3.7 x 3.9 x 4.7 cm. Malignancy not excluded.   This could be a pleural based lesion or may represent a neurogenic mass. There is no acute infiltrate. There is no pleural effusion or pneumothorax. Upper Abdomen: Images through the upper abdomen demonstrate hepatic steatosis. Soft Tissues/Bones: No acute bone or soft tissue abnormality. 1. There is a linear shadow through the anterior margin of ascending aorta and proximal aortic arch which is probably artifactual from pulsations although it is difficult to completely exclude a subtle aortic dissection. There is no evidence of dissection involving the distal arch or descending thoracic aorta. 2. No pulmonary embolus seen. 3. There is a 3.7 x 3.9 x 4.7 cm mass in the posteromedial lower left hemithorax, paraspinal region abutting descending thoracic aorta. Malignancy not excluded. This this could be a pleural base mass or mass of neurogenic origin. 4. Hepatic steatosis. CTA ABDOMEN PELVIS W CONTRAST    Result Date: 2/7/2022  EXAMINATION: CTA OF THE ABDOMEN AND PELVIS WITH CONTRAST 2/7/2022 1:32 am: TECHNIQUE: CTA of the abdomen and pelvis was performed with the administration of intravenous contrast. Multiplanar reformatted images are provided for review. MIP images are provided for review. Dose modulation, iterative reconstruction, and/or weight based adjustment of the mA/kV was utilized to reduce the radiation dose to as low as reasonably achievable. COMPARISON: None. HISTORY: ORDERING SYSTEM PROVIDED HISTORY: abd pain TECHNOLOGIST PROVIDED HISTORY: Reason for exam:->abd pain Decision Support Exception - unselect if not a suspected or confirmed emergency medical condition->Emergency Medical Condition (MA) FINDINGS: CTA ABDOMEN: Intact abdominal aorta and its major branches with no aneurysm or dissection. .  An approximately 3.7 x 3.7 cm masslike lesion in left posterior mediastinum. Please refer to the report of CTA of the chest for details. Marked diffuse hepatic steatosis. Incompletely distended gallbladder.   No splenomegaly. Unremarkable pancreas, bilateral kidneys, and adrenals. Normal appendix. Bowel loops nonobstructed. Distal colonic diverticulosis. No acute diverticulitis. Trace ascites. No free air. No adenopathy. No acute osseous abnormality. CTA PELVIS: Intact pelvic arteries with no aneurysm or dissection. Small fat containing left inguinal hernia. Trace pelvic ascites. Normal sized prostate. Urinary bladder grossly unremarkable. No acute osseous abnormality. Intact abdominal and pelvic arteries with no aneurysm or dissection. A rounded masslike lesion in the left posterior mediastinum. Please refer to the report of CTA of the chest for details. Marked diffuse hepatic steatosis. Normal appendix. Distal colonic diverticulosis. No acute diverticulitis. Trace ascites. ASSESSMENT and PLAN:    # Hematemesis  - Initially presented with hypotension after multiple bouts of hematemesis  - Continues to have vivienne hematemesis since presentation  - Likely 2/2 PUD vs variceal bleed  - Received two units PRBC; maintain two more units on hold  - Proceed with emergent EGD for control of hemorrhage  - Agree with protonix and octreotide infusion    # History of alcohol use disorder  # Elevated liver chemistries  - Without evidence of cirrhosis on exam or on labs; no thrombocytopenia, no ascites, and no splenomegaly  - Monitor for withdrawal  - AST:ALT almost 4:1 with elevated bilirubin and ALP  - Likely related to alcohol use  - Trend liver enzymes daily  - Workup pending clinical course    # Abnormal CT scan  - Concern for possible dissection, but per ER discussion with Dr. Elver Berry, this appears less likely  - Management per primary/ER    Please see orders for further plan of care.   Discussed with Dr. Yoni Clifford DO  GI fellow  2/7/2022 at 2:07 AM

## 2022-02-07 NOTE — FLOWSHEET NOTE
Pt admitted direct to [phone] from Emergency Dept. Pt orally intubated with Roddy Aquino Tube secure and intact. Pt with Vitamin K IV and Sandostatin IV infusing and Normal Saline infusing to right femoral introducer and Left AC IV and Right AC. Pt agitated pulling at two point soft restraints, moving all extremities, pt does not redirect to repeated verbal commands. Dr. Nanette Arreguin at bedside. Orders for Propofol and Fentanyl IVs ordered and administered. Right radial artline inserted by Dr. Nanette Arreguin. Interventional Radiology RNs at bedside to transport pt to Interventional Radiology, with ICU RN to assist. Pt transported to Interventional Radiology, complete report given to IR staff and Anesthesia. Complete report given to CECI Appiah Rn in handoff.

## 2022-02-07 NOTE — ANESTHESIA PRE PROCEDURE
Department of Anesthesiology  Preprocedure Note       Name:  Jennifer Norwood   Age:  42 y.o.  :  1975                                          MRN:  [id number]         Date:  2022      Surgeon: * Surgery not found *    Procedure: TIPS    Medications prior to admission:   Prior to Admission medications    Not on File       Current medications:    No current facility-administered medications for this visit. No current outpatient medications on file. Facility-Administered Medications Ordered in Other Visits   Medication Dose Route Frequency Provider Last Rate Last Admin    propofol 1000 MG/100ML injection                Allergies:  No Known Allergies    Problem List:    Patient Active Problem List   Diagnosis Code    GI bleed K92.2    Bleeding esophageal varices (HCC) I85.01       Past Medical History:  No past medical history on file. Past Surgical History:  No past surgical history on file. Social History:    Social History     Tobacco Use    Smoking status: Not on file    Smokeless tobacco: Not on file   Substance Use Topics    Alcohol use: Not on file                                Counseling given: Not Answered      Vital Signs (Current): There were no vitals filed for this visit.                                            BP Readings from Last 3 Encounters:   22 130/89   22 (!) 180/88   22 127/62       NPO Status:                                                                                 BMI:   Wt Readings from Last 3 Encounters:   22 240 lb (108.9 kg)     There is no height or weight on file to calculate BMI.    CBC:   Lab Results   Component Value Date    WBC 7.0 2022    RBC 3.21 2022    HGB 9.8 2022    HCT 29.8 2022    MCV 92.8 2022    RDW 18.2 2022     2022       CMP:   Lab Results   Component Value Date     2022    K 3.7 2022     2022    CO2 19 2022    BUN 7 02/07/2022    CREATININE 1.0 02/07/2022    GFRAA >60 02/07/2022    LABGLOM >60 02/07/2022    GLUCOSE 145 02/07/2022    PROT 7.6 02/07/2022    CALCIUM 8.3 02/07/2022    BILITOT 2.5 02/07/2022    ALKPHOS 173 02/07/2022     02/07/2022    ALT 63 02/07/2022       POC Tests: No results for input(s): POCGLU, POCNA, POCK, POCCL, POCBUN, POCHEMO, POCHCT in the last 72 hours. Coags:   Lab Results   Component Value Date    PROTIME 17.2 02/07/2022    INR 1.6 02/07/2022    APTT 30.8 02/07/2022       HCG (If Applicable): No results found for: PREGTESTUR, PREGSERUM, HCG, HCGQUANT     ABGs: No results found for: PHART, PO2ART, INI9SXU, SKO8CVP, BEART, K0IAXOEZ     Type & Screen (If Applicable):  No results found for: LABABO, LABRH    Drug/Infectious Status (If Applicable):  No results found for: HIV, HEPCAB    COVID-19 Screening (If Applicable):   Lab Results   Component Value Date    COVID19 Not Detected 02/07/2022           Anesthesia Evaluation  Patient summary reviewed and Nursing notes reviewed no history of anesthetic complications:   Airway: Mallampati: Unable to assess / NA       Comment: intubated   Dental:          Pulmonary:Negative Pulmonary ROS       (-) not a current smoker                          ROS comment: Rapid COVID-19 negative  PE comment: Tachypneic Cardiovascular:Negative CV ROS  Exercise tolerance: good (>4 METS),           Rhythm: regular  Rate: abnormal           Beta Blocker:  Not on Beta Blocker         Neuro/Psych:   Negative Neuro/Psych ROS              GI/Hepatic/Renal:   (+) PUD, liver disease (Probable underlying cirrhosis with varices):, morbid obesity (BMI 36.5 kg/m2)         ROS comment: Acute GI bleed needing endoscopic evaluation  Elevated INR, LFT's, and lactate with an INR of 1.6  History of heavy EtOH usage.    Endo/Other: Negative Endo/Other ROS   (+) blood dyscrasia (Hbg 9.8 & Hct 29.8 post PRBC transfusion x 2): anemia:., .          Pt had no PAT visit       Abdominal: Vascular: negative vascular ROS. Other Findings: Distended abdomen              Anesthesia Plan      general     ASA 4 - emergent       Induction: intravenous and rapid sequence. Anesthetic plan and risks discussed with Unable to obtain due to emergent nature. Plan discussed with attending.                   NURIS Nick - CRNA   2/7/2022

## 2022-02-07 NOTE — PROGRESS NOTES
[facility]  SURGICAL INTENSIVE CARE UNIT (SICU)  ATTENDING PHYSICIAN CRITICAL CARE PROGRESS NOTE     I have examined the patient, reviewed the record, and discussed the case with the APN/ resident. Please refer to the APN/ resident's note. I agree with the assessment and plan. I have reviewed all relevant labs and imaging data. The following summarizes my clinical findings and independent assessment. CC:  Critical care management for GI bleed    Hospital Course/Overnight Events:  2/6--presented to [address] [place] [address] with hematemesis  2/7--underwent EGD with esophageal variceal banding--continued bleeding and SB tube placed; transferred here for TIPS; underwent TIPS this AM    Intubated; sedated; narcotized  Pupils: Pinpoint  Sclera:  Icteric  Conjunctiva: Red  Heart: Regular rate; tacky; no murmur  Lungs: Fairly clear bilaterally  Abdomen: Distended; hypoactive bowel sounds  Skin: Warm/dry  Extremities: No edema; distal pulses readily detectable  SP tube in place--gastric balloon inflated; esophageal balloon deflated    Labs/films personally reviewed--no infiltrates on chest x-ray; well-circumscribed left lung mass noted on CT of the chest; fatty liver with minimal ascites on CT of the abdomen    Patient Active Problem List    Diagnosis Date Noted    GI bleed 02/07/2022    Bleeding esophageal varices ([address] 02/07/2022    Alcohol abuse 02/07/2022    Acute blood loss anemia 02/07/2022    Pulmonary mass 02/07/2022       GI bleed secondary to bleeding esophageal varices--status post EGD with banding but with ongoing bleeding and subsequent SB tube placement--esophageal balloon deflated currently; on octreotide; PPI  Status post TIPS  EtOH abuse--start phenobarb  Acute respiratory failure--continue mechanical vent support; wean as able  Lactic acidosis--continue IV fluid resuscitation  Electrolyte imbalance (hyperkalemia/hypocalcemia/hyperphosphatemia)--correct as able  Hypoalbuminemia/moderate protein calorie malnutrition--NPO for now  Elevated LFTs--monitor labs  Acute blood loss anemia--monitor H/H  DVT risk--PCDs    Patient is at significant risk for hemodynamic/metabolic/respiratory deterioration requires ongoing ICU care    Kiesha Ortiz MD, FACS  2/7/2022  10:08 AM      Critical care time exclusive of teaching and procedures = 44 minutes       NOTE: This report was transcribed using voice recognition software. Every effort was made to ensure accuracy; however, inadvertent computerized transcription errors may be present.

## 2022-02-07 NOTE — CONSULTS
Surgical Intensive Care Unit  Consult Note  Date: 2/7/2022        Patient Name: Roddy Hayes     YOB: 1975      Age:  42 y.o. Gender: male    Consult by: Dr. Hernandez Severe  Consult to: Dr. Kenney Rangel  Reason:  critical care management    Chief complaint:  No chief complaint on file. CC: GI bleed    Date of admission:  2/7/2022  LOS: 0  Dates in ICU: 2/7-  Reason for ICU:  GI hemorrhage  Hospital course:    2/6 Presented to [facility] with abdominal pain, nausea and hematemesis. Received 2 units of pRBCs. 2/7: Underwent EGD with 14 bands of esophageal varices. Still bleeding so Blakemore tube inserted. Plan for transfer to [facility] for TIPS procedure and advanced GI intervention. HPI:  History obtained from: electronic medical record  Roddy Hayes is a 42 y.o. male with history of alcohol abuse who presents to [facility] with GI hemorrhage. He initially presented to [facility] with abdominal pain, nausea, and hematemesis. He was diaphoretic, pale, and reportedly appeared to be in distress. He was given 1 L fluid and 2 units of trauma blood. He was started on a Protonix drip. He underwent EGD with gI team where he was noted to have bleeding esophageal varices. Banding was attempted 14 times and the patient was still bleeding. A blakemore tube was inserted and the patient was transferred to [facility] for TIPS procedure and advanced GI services. Patient reportedly drinks at least 1 glass of vodka a day for the past 10 years. He experiences withdrawal symptoms if he does not drink everyday. He denied history of varices or liver disease/cirrhosis. He is not on any anticoagulation. PMH:  No past medical history on file. PSH:  No past surgical history on file. Allergies:  Patient has no known allergies.      Medications:  Prior to Admission medications    Not on File      Inpatient:  propofol 1000 MG/100ML injection,         SH:  Social History    None         FH:  No family history on file. Review of Systems:  Unable to complete (patient is either noncommunicative or has altered mental status)    Physical Exam:  /89   Pulse 112   Resp 22   SpO2 100%   Sedation/paralytic/drips: propofol and fentanyl    Net I/O: none recorded     Vent Settings: Additional Respiratory  Assessments  Pulse: 112  Resp: 22  SpO2: 100 %    GENERAL: intubated and sedated  NEURO: GCS 3T, pupils pinpoint  HEAD: Normocephalic, atraumatic  NECK: Trachea midline, mucous membranes dry  CARDIO-VASCULAR: tachycardic, no cyanosis  RESPIRATORY: AC/VC PEEP 8 FiO2 100%  GENITOURINARY: Escudero present  ABDOMEN / GI: Soft, moderately distended. Blakemore tube in place   SKIN: Warm, dry  MSK: No deformities, no edema. LABS:    ABG  No results for input(s): PH, PCO2, PO2, HCO3, BE, O2SAT in the last 72 hours. BMP/Lytes  Recent Labs     02/07/22 0008         CO2 19*   BUN 7   CREATININE 1.0     Recent Labs     02/07/22 0008   K 3.7   CALCIUM 8.3*     ionized calcium: 1.04  Liver Function  Recent Labs     02/07/22 0008   ALKPHOS 173*   ALT 63*   *   BILITOT 2.5*   BILIDIR 1.7*   LIPASE 94*   PROT 7.6   LABALBU 2.8*   INR 1.6     CBC/coags  Recent Labs     02/07/22 0008   WBC 7.0   HGB 9.8*   HCT 29.8*        Recent Labs     02/07/22 0008   INR 1.6     Other  No results for input(s): LACTATE, PROCAL, CORTISOL, TSH, CK, BNP in the last 72 hours. Invalid input(s): TROPONIN      IMAGING:  XR ABDOMEN (KUB) (SINGLE AP VIEW)    Result Date: 2/7/2022  EXAMINATION: ONE SUPINE XRAY VIEW(S) OF THE ABDOMEN 2/7/2022 12:27 am COMPARISON: None. HISTORY: ORDERING SYSTEM PROVIDED HISTORY: nausea ? free air TECHNOLOGIST PROVIDED HISTORY: Reason for exam:->nausea ? free air FINDINGS: No free air identified. Patient is semi upright rather than upright which limits evaluation for free air.   Visualized gas pattern is unremarkable. No free air seen although patient is semi upright rather than upright which limits evaluation. XR CHEST PORTABLE    Result Date: 2/7/2022  EXAMINATION: ONE XRAY VIEW OF THE CHEST 2/7/2022 12:18 am COMPARISON: None. HISTORY: ORDERING SYSTEM PROVIDED HISTORY: nausea, vomiting TECHNOLOGIST PROVIDED HISTORY: Reason for exam:->nausea, vomiting FINDINGS: The lungs are without acute focal process. There is no effusion or pneumothorax. Heart size upper limits of normal.  The osseous structures are without acute process. No acute process. CTA CHEST W CONTRAST    Result Date: 2/7/2022  EXAMINATION: CTA OF THE CHEST 2/7/2022 1:32 am TECHNIQUE: CTA of the chest was performed after the administration of intravenous contrast.  Multiplanar reformatted images are provided for review. MIP images are provided for review. Dose modulation, iterative reconstruction, and/or weight based adjustment of the mA/kV was utilized to reduce the radiation dose to as low as reasonably achievable. COMPARISON: None. HISTORY: ORDERING SYSTEM PROVIDED HISTORY: abd pain r/o dissection TECHNOLOGIST PROVIDED HISTORY: Reason for exam:->abd pain r/o dissection Decision Support Exception - unselect if not a suspected or confirmed emergency medical condition->Emergency Medical Condition (MA) FINDINGS: Pulmonary Arteries/aorta: There is no thoracic aortic aneurysm. There is a linear shadow through the anterior margin of ascending aorta and proximal arch which is probably due to pulsations. It is difficult to completely exclude aortic dissection in this region. No pulmonary embolus seen. Mediastinum: No evidence of mediastinal lymphadenopathy. The heart and pericardium demonstrate no acute abnormality. Lungs/pleura: There is an intrathoracic mass in left paraspinal region aseptically abutting the lower thoracic spine and descending aorta. It measures 3.7 x 3.9 x 4.7 cm. Malignancy not excluded.   This could be a pleural based lesion or may represent a neurogenic mass. There is no acute infiltrate. There is no pleural effusion or pneumothorax. Upper Abdomen: Images through the upper abdomen demonstrate hepatic steatosis. Soft Tissues/Bones: No acute bone or soft tissue abnormality. 1. There is a linear shadow through the anterior margin of ascending aorta and proximal aortic arch which is probably artifactual from pulsations although it is difficult to completely exclude a subtle aortic dissection. There is no evidence of dissection involving the distal arch or descending thoracic aorta. 2. No pulmonary embolus seen. 3. There is a 3.7 x 3.9 x 4.7 cm mass in the posteromedial lower left hemithorax, paraspinal region abutting descending thoracic aorta. Malignancy not excluded. This this could be a pleural base mass or mass of neurogenic origin. 4. Hepatic steatosis. CTA ABDOMEN PELVIS W CONTRAST    Result Date: 2/7/2022  EXAMINATION: CTA OF THE ABDOMEN AND PELVIS WITH CONTRAST 2/7/2022 1:32 am: TECHNIQUE: CTA of the abdomen and pelvis was performed with the administration of intravenous contrast. Multiplanar reformatted images are provided for review. MIP images are provided for review. Dose modulation, iterative reconstruction, and/or weight based adjustment of the mA/kV was utilized to reduce the radiation dose to as low as reasonably achievable. COMPARISON: None. HISTORY: ORDERING SYSTEM PROVIDED HISTORY: abd pain TECHNOLOGIST PROVIDED HISTORY: Reason for exam:->abd pain Decision Support Exception - unselect if not a suspected or confirmed emergency medical condition->Emergency Medical Condition (MA) FINDINGS: CTA ABDOMEN: Intact abdominal aorta and its major branches with no aneurysm or dissection. .  An approximately 3.7 x 3.7 cm masslike lesion in left posterior mediastinum. Please refer to the report of CTA of the chest for details. Marked diffuse hepatic steatosis. Incompletely distended gallbladder.   No splenomegaly. Unremarkable pancreas, bilateral kidneys, and adrenals. Normal appendix. Bowel loops nonobstructed. Distal colonic diverticulosis. No acute diverticulitis. Trace ascites. No free air. No adenopathy. No acute osseous abnormality. CTA PELVIS: Intact pelvic arteries with no aneurysm or dissection. Small fat containing left inguinal hernia. Trace pelvic ascites. Normal sized prostate. Urinary bladder grossly unremarkable. No acute osseous abnormality. Intact abdominal and pelvic arteries with no aneurysm or dissection. A rounded masslike lesion in the left posterior mediastinum. Please refer to the report of CTA of the chest for details. Marked diffuse hepatic steatosis. Normal appendix. Distal colonic diverticulosis. No acute diverticulitis. Trace ascites. Problem List:   Hospital Problems           Last Modified POA    Bleeding esophageal varices ([address] 2/7/2022 Yes            ASSESSMENT / PLAN:    Neuro:    - Pain/Sedation:  Fentanyl and propofol  - Alcohol withdrawal: Phenobarbitol 250 q6 for 4 doses then 130 q 6. CV:  - Hemorrhagic shock- resolved. Continue to monitor hemodynamics    Pulm:   ABG  - Acute respiratory failure: On ventilator AC/VC PEEP 10 FiO2 100%    Renal:  - hyperkalemia: insulin, dextrose, and sodium bicarbonate given. Repeat BMP ordered  - hypocalcemia: replace as needed  - Lactic acidosis: down-trending, continue to monitor and resuscitate with IV fluids. GI:    - Diet:  NPO  - variceal hemorrhage: Blakemore tube in place, Dr. Jadyn Santoyo consulted, IR TIPS procedure, Monitor HgB, Octreotide gtt, PPI  - elevated transaminases: continue to monitor, may need NAC if developing into shock liver    Heme:  - acute blood loss anemia: 2 units of trauma blood given overnight. HgB responded appropriately, continue to monitor    ID:  - No acute issues    Endo:   - No acute issues. Keep glucose < 180. MSK:  - No acute issues.  WBAT to all extremities    Bowel reg:  none  DVT ppx:  SCDs  GI ppx:  PPI, octreotide  Seizure proph:  none  Mouth/eye care: Lacrilube, Peridex  Escudero:  For critical care monitoring of fluid balance  Central lines: Introducer  Family Update: As available   CODE Status:  FULL     Dispo:  SICU    Please see attending note for corrections/updates.     Electronically signed by Trav Foster MD on 2/7/22 at 6:11 AM EST

## 2022-02-07 NOTE — ED NOTES
Pt feels he will be able to lay flat for CT scan, taken to CT on monitor, tolerated well.        [address] [facility]  02/07/22 9169

## 2022-02-07 NOTE — FLOWSHEET NOTE
Patient will reach at lines and tubes needing to be redirected much of time. Attempting to provide calm environment and reorientation, but patient still assessed to be a risk of harm to self. Bilateral 2 point soft wrist restraints initiated at this time. Family aware for need of restraints. Will continue to monitor patient and assess for earliest removal capability.

## 2022-02-07 NOTE — PROGRESS NOTES
Physical Therapy  Physical Therapy Attempt    Name: Tatyana Torres  : 1975  MRN: [id number]      Date of Service: 2022  Chart reviewed. Pt currently off unit and then scheduled for TIPS procedure today. Will re-attempt as able.     Ricky Mcpherson, PT, DPT  [id number]

## 2022-02-07 NOTE — CONSULTS
General Surgery Consult  Ki Begum MD, MS    Patient's Name/Date of Birth: Sloane Vizcarra / 1975    Date: February 7, 2022     Consulting Surgeon: Gualberto Snow MD    PCP: Syd Villalobos MD     Chief Complaint: GI bleed    HPI:   Sloane Vizcarra is a 42 y.o. male who presents for evaluation of GI bleed. Timing is constant, radiation to midline and back, alleviated by none and started tonight and severity is 10/10. History of heavy etoh use daily. States started with abdominal pain and nausea, vomiting around 10p. Developed hematemesis and worsening pain. Several episodes of vomiting blood. Hx of ulcer pain in past, on PPI, no hx of previous endoscopy. Presented hypotensive and tachycardic, received 2 units PRBC in ER with improvement of BP. Hgb 9.8. He does admit to etoh withdrawal sx if he stops for a day. Drinks large glass of vodka daily at least for more than 10 yrs. Has not stopped for a long time. There is no problem list on file for this patient. No Known Allergies    No past medical history on file. No past surgical history on file.     Social History     Socioeconomic History    Marital status:      Spouse name: Not on file    Number of children: Not on file    Years of education: Not on file    Highest education level: Not on file   Occupational History    Not on file   Tobacco Use    Smoking status: Not on file    Smokeless tobacco: Not on file   Substance and Sexual Activity    Alcohol use: Not on file    Drug use: Not on file    Sexual activity: Not on file   Other Topics Concern    Not on file   Social History Narrative    Not on file     Social Determinants of Health     Financial Resource Strain:     Difficulty of Paying Living Expenses: Not on file   Food Insecurity:     Worried About Running Out of Food in the Last Year: Not on file    Shai of Food in the Last Year: Not on file   Transportation Needs:  Lack of Transportation (Medical): Not on file    Lack of Transportation (Non-Medical): Not on file   Physical Activity:     Days of Exercise per Week: Not on file    Minutes of Exercise per Session: Not on file   Stress:     Feeling of Stress : Not on file   Social Connections:     Frequency of Communication with Friends and Family: Not on file    Frequency of Social Gatherings with Friends and Family: Not on file    Attends Mandaen Services: Not on file    Active Member of [address] [place] [address] or Organizations: Not on file    Attends Club or Organization Meetings: Not on file    Marital Status: Not on file   Intimate Partner Violence:     Fear of Current or Ex-Partner: Not on file    Emotionally Abused: Not on file    Physically Abused: Not on file    Sexually Abused: Not on file   Housing Stability:     Unable to Pay for Housing in the Last Year: Not on file    Number of [place] in the Last Year: Not on file    Unstable Housing in the Last Year: Not on file       The patient has a family history that is negative for severe cardiovascular or respiratory issues, negative for reaction to anesthesia. Recent Labs     02/07/22 0008   WBC 7.0   HGB 9.8*   HCT 29.8*   MCV 92.8          Recent Labs     02/07/22 0008      K 3.7   CO2 19*   BUN 7   CREATININE 1.0       Recent Labs     02/07/22 0008   PROT 7.6   INR 1.6   LIPASE 94*       No intake or output data in the 24 hours ending 02/07/22 0105    I have reviewed relevant labs from this admission and interpretation is included in my assessment and plan      Review of Systems  A complete 10 system review was performed and are otherwise negative unless mentioned in the above HPI. Specific negatives are listed below but may not include all those reviewed.     General ROS: negative obtundation, AMS  ENT ROS: negative rhinorrhea, epistaxis  Allergy and Immunology ROS: negative itchy/watery eyes or nasal congestion  Hematological and Lymphatic ROS: negative spontaneous bleeding or bruising  Endocrine ROS: negative  lethargy, mood swings, palpitations or polydipsia/polyuria  Respiratory ROS: negative sputum changes, stridor, tachypnea or wheezing  Cardiovascular ROS: negative for - loss of consciousness, murmur or orthopnea  Gastrointestinal ROS: negative for - hematochezia or hematemesis  Genito-Urinary ROS: negative for -  genital discharge or hematuria  Musculoskeletal ROS: negative for - focal weakness, gangrene  Psych/Neuro ROS: negative for - visual or auditory hallucinations, suicidal ideation      Physical exam:   /69   Pulse 114   Temp 97.6 °F (36.4 °C) (Oral)   Resp 20   Ht 5' 8\" (1.727 m)   Wt 240 lb (108.9 kg)   SpO2 94%   BMI 36.49 kg/m²   General appearance:  NAD, appears stated age  Head: NCAT, PERRLA, EOMI, pale conjunctiva, scleral icterus  Neck: supple, no masses, trachea midline  Lungs: Equal chest rise bilateral, no retractions, no wheezing  Heart: Tachycardic  Abdomen: soft, tender epigastric, obese  Skin; clamy, pale, no cyanosis  Gu: no cva tenderness  Extremities: atraumatic, no focal motor deficits, no open wounds  Psych: no visual hallucinations      Radiology: I reviewed relevant abdominal imaging from this admission and that available in the EMR    Assessment:  Ana M Quiros is a 42 y.o. male with UGI bleed, Etoh abuse, hepatic insuff    There is no problem list on file for this patient. Plan:  Transfuse PRBC and FFP  Urgent upper endoscopy for control of hemorrhage  GI consult  STAT CTA  Time spent reviewing past medical, surgical, social and family history, vitals, nursing assessment and images. Time spent face to face with patient and family counciling and discussing care exceeded 50% of the time of the consult. Additional time spent reviewing images and labs, discussing case with nursing, support staff and other physicians; as well as coordinating care.         Physician Signature: Electronically signed by Dr. Dax Liu  [phone] (p)

## 2022-02-07 NOTE — PLAN OF CARE
Problem: Non-Violent Restraints  Goal: Removal from restraints as soon as assessed to be safe  2/7/2022 1737 by Harris Evans RN  Outcome: Not Met This Shift  2/7/2022 1737 by Harris Evans RN  Outcome: Not Met This Shift  Goal: No harm/injury to patient while restraints in use  2/7/2022 1737 by Harris Evans RN  Outcome: Met This Shift  2/7/2022 1737 by Harris Evans RN  Outcome: Met This Shift  Goal: Patient's dignity will be maintained  2/7/2022 1737 by Harris Evans RN  Outcome: Met This Shift  2/7/2022 1737 by Harris Evans RN  Outcome: Met This Shift     Problem: Falls - Risk of:  Goal: Will remain free from falls  Description: Will remain free from falls  Outcome: Met This Shift  Goal: Absence of physical injury  Description: Absence of physical injury  Outcome: Met This Shift

## 2022-02-07 NOTE — ED NOTES
Radiology Procedure Waiver   Name: Katiuska Ortega  : 1975  MRN: [id number]    Date:  22    Time: 12:05 AM EST    Benefits of immediately proceeding with radiology exam(s) without pre-testing outweigh the risks or are not indicated as specified below and therefore the following is/are being waived:    [x] Benefits of immediate radiology exam(s) outweigh any risk. OR    Pre-exam testing is not indicated for the following reason(s):  [] Pregnancy test   [] Patients LMP on-time and regular.   [] Patient had Tubal Ligation or has other Contraception Device. [] Patient  is Menopausal or Premenarcheal.    [] Patient had Full or Partial Hysterectomy. [] Protocol for CT contrast allegry   [] Patient has tolerated well previously   [] Patient does not have a true allergy    [] MRI Questionnaire     [] BUN/Creatinine   [] Patient age w/no hx of renal dysfunction. [] Patient on Dialysis. [] Recent Normal Labs.   Electronically signed by Maddison De Dios DO on 22 at 12:05 AM BELLE De Dios DO  22 0005

## 2022-02-07 NOTE — H&P
[address] [facility]  History and Physical      CHIEF COMPLAINT:       Patient of Mu Hull MD presents with:  Hematemesis    History of Present Illness:   Yesterday the patient abruptly started vomiting copious amounts of blood. He is a heavy drinker. He has no prior known cirrhosis. He was brought to the emergency department where he underwent emergent endoscopy with GI, found to have actively bleeding varices, was transfused 4 units of packed red cells, noted to have mild coagulopathy and given vitamin K, the bleeding could not be controlled endoscopically so a balloon device was left in place, and he was sent to our ICU intubated. He is unable to provide any history. REVIEW OF SYSTEMS:  Unable to obtain, pt on vent      No past medical history on file. No past surgical history on file. Medications Prior to Admission:    No medications prior to admission. Note that the patient's home medications were reviewed and the above list is accurate to the best of my knowledge at the time of the exam.    Allergies:    Patient has no known allergies. Social History:   Heavy vodka drinker    Family History:   Unable to obtain, pt on vent      PHYSICAL EXAM:    Vitals:  /89   Pulse 112   Resp 22   SpO2 100%       General appearance: Very ill appearing morbidly obese man on ventilator  Eyes: Sclerae anicteric  HEENT: AT/NC, ETT  Neck: FROM, supple, no thyromegaly  Lymph: No cervical / supraclavicular lymphadenopathy  Lungs: Clear to auscultation, WOB normal  CV: RRR, no MRGs, no lower extremity edema  Abdomen: Protuberant, not clearly tender  Extremities: FROM without synovitis. No clubbing or cyanosis of the hands. Skin: no rash, induration, lesions, or ulcers  Psych: Intubated/sedated  Neuro: Intubated/sedated    LABS:  All labs reviewed.   Of note:  CBC with Differential:    Lab Results   Component Value Date    WBC 9.2 02/07/2022    RBC 3.82 02/07/2022    HGB 11.6 02/07/2022    HCT 35.7 02/07/2022     02/07/2022    MCV 93.5 02/07/2022    MCH 30.4 02/07/2022    MCHC 32.5 02/07/2022    RDW 17.7 02/07/2022    LYMPHOPCT 14.8 02/07/2022    MONOPCT 8.7 02/07/2022    BASOPCT 1.3 02/07/2022    MONOSABS 0.61 02/07/2022    LYMPHSABS 1.04 02/07/2022    EOSABS 0.07 02/07/2022    BASOSABS 0.09 02/07/2022     CMP:    Lab Results   Component Value Date     02/07/2022    K 3.7 02/07/2022     02/07/2022    CO2 19 02/07/2022    BUN 7 02/07/2022    CREATININE 1.0 02/07/2022    GFRAA >60 02/07/2022    LABGLOM >60 02/07/2022    GLUCOSE 145 02/07/2022    PROT 7.6 02/07/2022    LABALBU 2.8 02/07/2022    CALCIUM 8.3 02/07/2022    BILITOT 2.5 02/07/2022    ALKPHOS 173 02/07/2022     02/07/2022    ALT 63 02/07/2022       Imaging:  I've personally reviewed the patient's CTA a/p  Intact abdominal and pelvic arteries with no aneurysm or dissection.       A rounded masslike lesion in the left posterior mediastinum.  Please refer to   the report of CTA of the chest for details.       Marked diffuse hepatic steatosis.       Normal appendix.       Distal colonic diverticulosis.  No acute diverticulitis.       Trace ascites. I've personally reviewed the patient's CTA chest  1. There is a linear shadow through the anterior margin of ascending aorta   and proximal aortic arch which is probably artifactual from pulsations   although it is difficult to completely exclude a subtle aortic dissection. There is no evidence of dissection involving the distal arch or descending   thoracic aorta. 2. No pulmonary embolus seen. 3. There is a 3.7 x 3.9 x 4.7 cm mass in the posteromedial lower left   hemithorax, paraspinal region abutting descending thoracic aorta.  Malignancy   not excluded.  This this could be a pleural base mass or mass of neurogenic   origin. 4. Hepatic steatosis.      I've personally reviewed the patient's EKG  NSR slightly peaked T waves, QTc and QRS are normal, no acute ischemi changes    I've personally reviewed the patient's telemetry  NSR no arrhythmias      ASSESSMENT/PLAN:  Principal Problem:    Hematemesis  Active Problems:    Bleeding esophageal varices (HCC)    Alcohol abuse    Acute blood loss anemia    Pulmonary mass    Hyperkalemia    Morbid obesity with BMI of 40.0-44.9, adult (HCC)    Transaminitis  Resolved Problems:    * No resolved hospital problems. *      Variceal hemorrhage due to alcoholism +/- NAFLD (quite obese)      Check ferritin, ceruloplasmin, hepatitis B/C.    CT doesn't show clear cirrhosis. The liver appears enlarged and heterogeneous on my review of the images. But he almost certainly has cirrhosis based on the varices. Monitor LFTs which are high    S/P 4 U PRBC    Stop LR, switch to sodium bicarb for hyperkalemia. Given calcium and insulin/d50 as well.   Q6H electrolytes    Q6H CBC    Protonix    Somatostatin    Ceftriaxone for SBP prophylaxis (ascites on CT)    DVT prophylaxis: SCD  Code status: Full  Requires inpatient level of care  Luana Barrow MD    7:06 AM  2/7/2022

## 2022-02-07 NOTE — ANESTHESIA POSTPROCEDURE EVALUATION
Department of Anesthesiology  Postprocedure Note    Patient: Sloane Vizcarra  MRN: [id number]  YOB: 1975  Date of evaluation: 2/7/2022  Time:  10:41 AM     Procedure Summary     Date: 02/07/22 Room / Location: [address] [place] [address] Procedures; [facility] Radiology    Anesthesia Start: 7524 Anesthesia Stop: 1582    Procedure: IR TIPS INSERTION Diagnosis: (gastric bleeding )    Scheduled Providers: Mario Alberto Radiologist Responsible Provider: Tang Quick MD    Anesthesia Type: general ASA Status: 4 - Emergent          Anesthesia Type: general    Glory Phase I: Glory Score: 3    Glory Phase II:      Last vitals: Reviewed and per EMR flowsheets.        Anesthesia Post Evaluation    Patient location during evaluation: ICU  Patient participation: complete - patient cannot participate  Level of consciousness: sedated and ventilated  Airway patency: patent  Nausea & Vomiting: no nausea and no vomiting  Complications: no  Cardiovascular status: blood pressure returned to baseline and hemodynamically stable  Respiratory status: acceptable, ventilator and intubated  Hydration status: euvolemic

## 2022-02-07 NOTE — PLAN OF CARE
Problem: Falls - Risk of:  Goal: Will remain free from falls  Description: Will remain free from falls  Outcome: Met This Shift  Goal: Absence of physical injury  Description: Absence of physical injury  Outcome: Met This Shift     Problem: Non-Violent Restraints  Goal: Removal from restraints as soon as assessed to be safe  Outcome: Not Met This Shift  Goal: No harm/injury to patient while restraints in use  Outcome: Met This Shift  Goal: Patient's dignity will be maintained  Outcome: Met This Shift

## 2022-02-07 NOTE — ED NOTES
Bed: 22  Expected date:   Expected time:   Means of arrival:   Comments:  Previous patient is coming back     Formerly [facility] 12  02/07/22 7631

## 2022-02-07 NOTE — OP NOTE
Operative Note      Patient: Sloane Precise  YOB: 1975  MRN: [id number]    Date of Procedure: 2/7/2022    Pre-Op Diagnosis: GI BLEED    Post-Op Diagnosis: Hemorrhage from esophageal varices, hemorrhagic shock       Procedure(s):  EGD ESOPHAGOGASTRODUODENOSCOPY ESOPHAGEAL BANDING, INSERTION OF SENGSTAKEN-BLAKEMORE TUBE    Surgeon(s):  MD ROMAN Byrd MD    Assistant:   None    Anesthesia: General    Estimated Blood Loss (mL): 9445    Complications: None    FLUIDS: 2 units PRBC    Drains: Blakemore tube inserted    Findings: persistent bleeding    Detailed Description of Procedure:   Is a 70-year-old male presented with an upper GI hemorrhage. History of alcohol abuse and likely newly diagnosed cirrhosis. Presented hemorrhagic shock received 2 units of packed red blood cells in the emergency department subsequently was taken for emergent upper endoscopy. GI performed the upper endoscopy with banding of multiple esophageal varices. Upper endoscopy revealed no evidence of bleeding in the first or second portion of the duodenum. There was no evidence of active bleeding in the distal stomach. Large clots were present in the proximal stomach it was difficult to visualize the cardia and body in its completion. Bleeding appeared to be coming from the GE junction or just proximal to it. After banding persistent bleeding continued. Therefore we inserted a sengstaken Blakemore tube. We initially threaded the tube through the left nares and into the esophagus. Direct visualization with the endoscope was used to thread this gastroscope into the esophagus while pushing the balloons distal.  I report all the way into the stomach and direct visualization of the distal gastric balloon was appreciated. Distal balloon was blown up to 250 cc of air. The scope was then withdrawn and we retracted the balloon back until tension was felt.   We then insufflated the esophageal balloon under

## 2022-02-07 NOTE — ED NOTES
Pt transferred to OR on monitor with RN. Report given to CRNA and MD.  Wife to waiting room.   Pt continues to intermittently vomit [employer], [facility]  02/07/22 1627

## 2022-02-07 NOTE — ANESTHESIA PRE PROCEDURE
Department of Anesthesiology  Preprocedure Note       Name:  Chanel Welch   Age:  42 y.o.  :  1975                                          MRN:  [id number]         Date:  2022      Surgeon: Greta Reed and Jordy    Procedure: EGD with control of esophageal hemorrhage    Medications prior to admission:   Prior to Admission medications    Not on File       Current medications:    Current Facility-Administered Medications   Medication Dose Route Frequency Provider Last Rate Last Admin    thiamine (B-1) injection 100 mg  100 mg IntraVENous Once Maryann Sheets, DO        iopamidol (ISOVUE-370) 76 % injection 75 mL  75 mL IntraVENous ONCE PRN Ismael Jak, DO        octreotide (SANDOSTATIN) 500 mcg in sodium chloride 0.9 % 100 mL infusion  50 mcg/hr IntraVENous Continuous Ellyn Halon, DO        cefTRIAXone (ROCEPHIN) 2,000 mg in sterile water 20 mL IV syringe  2,000 mg IntraVENous Once Maryann Sheets, DO        octreotide (SANDOSTATIN) injection 50 mcg  50 mcg IntraVENous Once Maryann Sheets, DO        0.9 % sodium chloride infusion   IntraVENous PRN Michael Lard, DO        phytonadione (ADULT) (VITAMIN K) 10 mg in dextrose 5 % 100 mL IVPB  10 mg IntraVENous Once Lieutenant Abdon MD        0.9 % sodium chloride infusion   IntraVENous PRN Lieutenant Abdon MD        pantoprazole (PROTONIX) injection 40 mg  40 mg IntraVENous Q6H Ellyn Halon, DO         No current outpatient medications on file. Allergies:  No Known Allergies    Problem List:  There is no problem list on file for this patient. Past Medical History:  No past medical history on file. Past Surgical History:  No past surgical history on file.     Social History:    Social History     Tobacco Use    Smoking status: Not on file    Smokeless tobacco: Not on file   Substance Use Topics    Alcohol use: Not on file                                Counseling given: Not Answered      Vital Signs (Current):   Vitals:    02/07/22 0009 02/07/22 0021 02/07/22 0024 02/07/22 0039   BP: (!) 91/56 104/63 96/61 117/69   Pulse: 102 102 102 114   Resp: 22 22 20   Temp:       TempSrc:       SpO2: 94%      Weight:       Height:                                                  BP Readings from Last 3 Encounters:   02/07/22 117/69       NPO Status:                                                                                 BMI:   Wt Readings from Last 3 Encounters:   02/06/22 240 lb (108.9 kg)     Body mass index is 36.49 kg/m². CBC:   Lab Results   Component Value Date    WBC 7.0 02/07/2022    RBC 3.21 02/07/2022    HGB 9.8 02/07/2022    HCT 29.8 02/07/2022    MCV 92.8 02/07/2022    RDW 18.2 02/07/2022     02/07/2022       CMP:   Lab Results   Component Value Date     02/07/2022    K 3.7 02/07/2022     02/07/2022    CO2 19 02/07/2022    BUN 7 02/07/2022    CREATININE 1.0 02/07/2022    GFRAA >60 02/07/2022    LABGLOM >60 02/07/2022    GLUCOSE 145 02/07/2022    PROT 7.6 02/07/2022    CALCIUM 8.3 02/07/2022    BILITOT 2.5 02/07/2022    ALKPHOS 173 02/07/2022     02/07/2022    ALT 63 02/07/2022       POC Tests: No results for input(s): POCGLU, POCNA, POCK, POCCL, POCBUN, POCHEMO, POCHCT in the last 72 hours.     Coags:   Lab Results   Component Value Date    PROTIME 17.2 02/07/2022    INR 1.6 02/07/2022    APTT 30.8 02/07/2022       HCG (If Applicable): No results found for: PREGTESTUR, PREGSERUM, HCG, HCGQUANT     ABGs: No results found for: PHART, PO2ART, KJX7JAM, FMK3OVT, BEART, X7LMWJXI     Type & Screen (If Applicable):  No results found for: LABABO, LABRH    Drug/Infectious Status (If Applicable):  No results found for: HIV, HEPCAB    COVID-19 Screening (If Applicable):   Lab Results   Component Value Date    COVID19 Not Detected 02/07/2022           Anesthesia Evaluation  Patient summary reviewed and Nursing notes reviewed no history of anesthetic complications:   Airway: Mallampati: III  TM distance: >3 FB   Neck ROM: limited  Mouth opening: > = 3 FB and < 3 FB Dental:          Pulmonary:Negative Pulmonary ROS       (-) not a current smoker                          ROS comment: Rapid COVID-19 negative  PE comment: Tachypneic Cardiovascular:Negative CV ROS  Exercise tolerance: good (>4 METS),           Rhythm: regular  Rate: abnormal           Beta Blocker:  Not on Beta Blocker         Neuro/Psych:   Negative Neuro/Psych ROS              GI/Hepatic/Renal:   (+) PUD, liver disease (Probable underlying cirrhosis with varices):, morbid obesity (BMI 36.5 kg/m2)         ROS comment: Acute GI bleed needing endoscopic evaluation  Elevated INR, LFT's, and lactate with an INR of 1.6  History of heavy EtOH usage. Endo/Other: Negative Endo/Other ROS   (+) blood dyscrasia (Hbg 9.8 & Hct 29.8 post PRBC transfusion x 2): anemia:., .          Pt had no PAT visit       Abdominal:       Abdomen: rigid. Vascular: negative vascular ROS. Other Findings: Distended abdomen          Anesthesia Plan      general     ASA 3 - emergent     (GA/OETT/RSI/HOB at 30 degrees RT  PONV prophylaxis)  Induction: intravenous and rapid sequence. MIPS: Postoperative opioids intended and Prophylactic antiemetics administered. Anesthetic plan and risks discussed with patient. Plan discussed with CRNA.                 Lani Cuevas DO   2/7/2022

## 2022-02-07 NOTE — CONSULTS
CTS Consult    Patient name: Ursula King    Reason for consult: Possible type A dissection    Referring Physician: ER    Primary Care Physician: Veronica Mohr MD    Date of service: 2/7/2022    Chief Complaint: Coughing up blood    HPI: 42year old with a history of alcohol abuse who presented to [facility] with UGIB. For some reason he had a CTA chest/abd/pelvis which was read as   There is a linear shadow through the anterior margin of ascending aorta   and proximal aortic arch which is probably artifactual from pulsations   although it is difficult to completely exclude a subtle aortic dissection. They called me and I said I highly doubted this- to which the resident then said \"we were worried about a possible aortic-esophageal fistula. \"  Needless to say this is not the case. I did not look at the films but stated he needed a gated CTA of his chest and that it sounded like his UGIB was the main issue. He underwent EGD and varicies were found and banded. He continued to bleed so a Blakemore tube was inserted and he was transferred here for TIPS. He is intubated and sedated. Some history is obtained from the chart.       Allergies: No Known Allergies    Home medications:    Current Facility-Administered Medications   Medication Dose Route Frequency Provider Last Rate Last Admin    propofol injection  5-50 mcg/kg/min IntraVENous Titrated Gianmarino C Gianfrate, DO   Stopped at 02/07/22 0719    fentaNYL 5 mcg/ml in 0.9%  ml infusion  12.5-200 mcg/hr IntraVENous Continuous Gianmarino C Gianfrate, DO 20 mL/hr at 02/07/22 0651 100 mcg/hr at 02/07/22 0651    sodium chloride flush 0.9 % injection 10 mL  10 mL IntraVENous 2 times per day Gianmarino C Gianfrate, DO        sodium chloride flush 0.9 % injection 10 mL  10 mL IntraVENous PRN Gianmarino C Gianfrate, DO        0.9 % sodium chloride infusion  25 mL IntraVENous PRN Gianmarino C Gianfrate, DO        ondansetron (ZOFRAN-ODT) disintegrating tablet 4 mg  4 mg Oral Q8H PRN Gianmarino C Gianfrate, DO        Or    ondansetron (ZOFRAN) injection 4 mg  4 mg IntraVENous Q6H PRN Gianmarino C Gianfrate, DO        octreotide (SANDOSTATIN) 500 mcg in sodium chloride 0.9 % 100 mL infusion  25 mcg/hr IntraVENous Continuous Gianmarino C Gianfrate, DO 5 mL/hr at 02/07/22 0651 25 mcg/hr at 02/07/22 0651    lactated ringers infusion   IntraVENous Continuous Gianmarino C Gianfrate,  mL/hr at 02/07/22 0651 NoRateChange at 02/07/22 0651    0.9 % sodium chloride infusion  25 mL IntraVENous PRN ALEJANDRA Shah        acetaminophen (TYLENOL) tablet 650 mg  650 mg Oral Q6H PRN ALEJANDRA Shah        Or    acetaminophen (TYLENOL) suppository 650 mg  650 mg Rectal Q6H PRN ALEJANDRA Shah        magnesium hydroxide (MILK OF MAGNESIA) 400 MG/5ML suspension 30 mL  30 mL Oral Daily PRN ALEJANDRA Shah        potassium chloride (KLOR-CON M) extended release tablet 40 mEq  40 mEq Oral PRN ALEJANDRA Shah        Or    potassium bicarb-citric acid (EFFER-K) effervescent tablet 40 mEq  40 mEq Oral PRN ALEJANDRA Shah        Or    potassium chloride 10 mEq/100 mL IVPB (Peripheral Line)  10 mEq IntraVENous PRN ALEJANDRA Shah        sodium chloride flush 0.9 % injection 10 mL  10 mL IntraVENous 2 times per day ALEJANDRA Shah        sodium chloride flush 0.9 % injection 10 mL  10 mL IntraVENous PRN ALEJANDRA Shah        trimethobenzamide (TIGAN) injection 200 mg  200 mg IntraMUSCular Q6H PRN ALEJANDRA Shah        0.9 % sodium chloride infusion   IntraVENous PRN Caesar Winters II, MD        0.9 % sodium chloride infusion   IntraVENous PRN Caesar Winters II, MD        0.9 % sodium chloride infusion   IntraVENous PRN Caesar Winters II, MD        0.9 % sodium chloride infusion   IntraVENous PRN Caesar Winters II, MD        pantoprazole (PROTONIX) injection 40 mg  40 mg IntraVENous BID Evan Yo MD        sodium bicarbonate 8.4 % injection 50 mEq  50 mEq IntraVENous Once Yolis Hart MD        insulin regular (HUMULIN R;NOVOLIN R) injection 10 Units  10 Units IntraVENous Once Yolis Hart MD        And    dextrose 50 % IV solution  25 g IntraVENous Once Yolis Hart MD        glucose (GLUTOSE) 40 % oral gel 15 g  15 g Oral PRN Yolis Hart MD        dextrose 50 % IV solution  12.5 g IntraVENous PRN Yolis Hart MD        glucagon (rDNA) injection 1 mg  1 mg IntraMUSCular PRN Yolis Hart MD        dextrose 5 % solution  100 mL/hr IntraVENous PRN Yolis Hart MD        calcium chloride 10 % injection 1,000 mg  1,000 mg IntraVENous Once Yolis Hart MD        chlorhexidine (PERIDEX) 0.12 % solution 15 mL  15 mL Mouth/Throat BID Ce Rock MD        polyvinyl alcohol (LIQUIFILM TEARS) 1.4 % ophthalmic solution 1 drop  1 drop Both Eyes Q4H Ce Rock MD        And    lubrifresh P.M. (artificial tears) ophthalmic ointment   Both Eyes Q4H Ce Rock MD         Facility-Administered Medications Ordered in Other Encounters   Medication Dose Route Frequency Provider Last Rate Last Admin    vecuronium (NORCURON) injection   IntraVENous PRN Percilla Phalen, APRN - CRNA   5 mg at 02/07/22 0754       Past Medical History:  No past medical history on file. Past Surgical History:  No past surgical history on file.     Social History:  Social History     Socioeconomic History    Marital status:      Spouse name: Not on file    Number of children: Not on file    Years of education: Not on file    Highest education level: Not on file   Occupational History    Not on file   Tobacco Use    Smoking status: Not on file    Smokeless tobacco: Not on file   Substance and Sexual Activity    Alcohol use: Not on file    Drug use: Not on file    Sexual activity: Not on file   Other Topics Concern    Not on file   Social History Narrative    Not on file     Social Determinants of Health     Financial Resource Strain:    [facility] Difficulty of Paying Living Expenses: Not on file   Food Insecurity:     Worried About Running Out of Food in the Last Year: Not on file    Ran Out of Food in the Last Year: Not on file   Transportation Needs:     Lack of Transportation (Medical): Not on file    Lack of Transportation (Non-Medical): Not on file   Physical Activity:     Days of Exercise per Week: Not on file    Minutes of Exercise per Session: Not on file   Stress:     Feeling of Stress : Not on file   Social Connections:     Frequency of Communication with Friends and Family: Not on file    Frequency of Social Gatherings with Friends and Family: Not on file    Attends Judaism Services: Not on file    Active Member of [address] [place] [address] [employer] or Organizations: Not on file    Attends Club or Organization Meetings: Not on file    Marital Status: Not on file   Intimate Partner Violence:     Fear of Current or Ex-Partner: Not on file    Emotionally Abused: Not on file    Physically Abused: Not on file    Sexually Abused: Not on file   Housing Stability:     Unable to Pay for Housing in the Last Year: Not on file    Number of [place] in the Last Year: Not on file    Unstable Housing in the Last Year: Not on file       Family History:  No family history on file. Review of Systems:  ND intubated    Labs:  Recent Labs     02/07/22  0008 02/07/22  0610   WBC 7.0 9.2   HGB 9.8* 11.6*   HCT 29.8* 35.7*    161      Recent Labs     02/07/22  0008 02/07/22  0610   BUN 7 9   CREATININE 1.0 0.9       Objective:  Vitals /70   Pulse 114   Temp 98.8 °F (37.1 °C) (Axillary)   Resp 16   SpO2 99%   Intubated and sedated  Headed to IR        Assessment: UGIB, possible type A dissection        Plan: This is quite obviously motion artifact, but he should have a gated CT at some point. He does have a thoracic mass as well that was NOT discussed with my by the ER resident- but brought to my attention by Dr. Ligia Schuster.   This looks well circumscribed and doubt malignancy. Perhaps it is a SFT or paraganglioma. Certainly his GIB issues take precedent. If he recovers I will see him in the office. CCT 34 minutes. I will sign off.   Franco Bernard MD        Electronically signed by Franco Bernard MD on 2/7/2022 at 8:11 AM

## 2022-02-07 NOTE — ANESTHESIA POSTPROCEDURE EVALUATION
Department of Anesthesiology  Postprocedure Note    Patient: Baron Erickson  MRN: [id number]  YOB: 1975  Date of evaluation: 2/7/2022  Time:  6:41 AM     Procedure Summary     Date: 02/07/22 Room / Location: [place] [id number] / [facility]    Anesthesia Start: 0216 Anesthesia Stop: 2856    Procedures:       EGD ESOPHAGOGASTRODUODENOSCOPY ESOPHAGEAL BANDING, , INSERTION OF SENGSTAKEN-BLAKEMORE TUBE (N/A )      Procedure Not Yet Scheduled Diagnosis: (GI BLEED)    Surgeons: Zeynep Kate MD Responsible Provider: Chaitanya Bazzi DO    Anesthesia Type: general ASA Status: 3 - Emergent          Anesthesia Type: general    Glory Phase I:      Glory Phase II:      Last vitals: Reviewed and per EMR flowsheets. Anesthesia Post Evaluation    Patient location during evaluation: bedside  Patient participation: complete - patient cannot participate  Level of consciousness: sedated and ventilated  Pain score: 3  Airway patency: patent  Nausea & Vomiting: no vomiting and no nausea  Complications: no  Cardiovascular status: hemodynamically stable  Respiratory status: ventilator  Hydration status: stable  Comments: Given underlying pathology and unstable nature of the patient, he was transferred to the ED for emergent transport to a tertiary care facility for further advanced treatment and TIPS. Although the patient remained hemodynamically stable during the procedure, two units of PRBC's were given intraoperatively given the profuse and ongoing blood loss. Furthermore, the patient had progressive abdominal distention over the course of the procedure resulting in higher than normal airway pressures.

## 2022-02-07 NOTE — ED PROVIDER NOTES
[address] [address]  Department of Emergency Medicine     Written by: Felix Meigs, DO  Patient Name: Devora Moran  Attending Provider: No att. providers found  Admit Date: 2022 11:49 PM  MRN: [id number]                   : 1975        Chief Complaint   Patient presents with    Abdominal Pain     abdominal pain and vomiting after eating shimp taco tonight. Back Pain    - Chief complaint    Patient is a 79-year-old male past medical history of alcohol abuse. Patient presents with chief complaint abdominal pain, back pain as well as episodes of nausea and vomiting. Patient stated symptoms began earlier tonight after eating some tacos. He notes that he has had multiple episodes of nausea with bright red blood in his vomitus. Patient also notes sharp epigastric abdominal pain that radiates to his back. He currently rates pain a 10 out of 10. He states that symptoms have been moderate to severe in severity and constant since onset. He denies any exacerbating or relieving factors. He denies any similar episodes in the past. Patient does admit to moderate to heavy alcohol use up to 16 oz of vodka a day. He denies any previous episodes of mild emesis he has not been diagnosed with any varices he has not undergone any EGDs in the past. Patient denies any fevers, chills, constipation, diarrhea, headache, lightheadedness, numbness or tingling    The history is provided by the patient. No  was used. Review of Systems   Constitutional: Negative for chills and fever. HENT: Negative for ear pain, sinus pressure and sore throat. Eyes: Negative for pain, discharge and redness. Respiratory: Negative for cough, shortness of breath and wheezing. Cardiovascular: Positive for chest pain. Gastrointestinal: Positive for abdominal pain, nausea and vomiting. Negative for diarrhea. Hematemesis   Genitourinary: Negative for dysuria and frequency. Musculoskeletal: Negative for arthralgias and back pain. Skin: Negative for rash and wound. Neurological: Negative for weakness and headaches. Hematological: Negative for adenopathy. All other systems reviewed and are negative. Physical Exam  Vitals and nursing note reviewed. Constitutional:       General: He is in acute distress. Appearance: He is well-developed. He is ill-appearing and diaphoretic. HENT:      Head: Normocephalic and atraumatic. Eyes:      General: Scleral icterus present. Conjunctiva/sclera: Conjunctivae normal.   Cardiovascular:      Rate and Rhythm: Normal rate and regular rhythm. Heart sounds: Normal heart sounds. No murmur heard. Pulmonary:      Effort: Pulmonary effort is normal. No respiratory distress. Breath sounds: Normal breath sounds. No wheezing or rales. Abdominal:      General: Abdomen is protuberant. Bowel sounds are normal. There is distension. Palpations: Abdomen is soft. There is fluid wave. Tenderness: There is no abdominal tenderness. There is no guarding or rebound. Comments: Abdomen is tense with generalized abdominal tenderness   Musculoskeletal:         General: No tenderness or deformity. Cervical back: Normal range of motion and neck supple. Skin:     General: Skin is warm. Coloration: Skin is jaundiced. Neurological:      Mental Status: He is alert and oriented to person, place, and time. Cranial Nerves: No cranial nerve deficit. Coordination: Coordination normal.          Procedures   Central Line Placement Procedure Note    Indication: vascular access and centrally administered medications    Consent: Unable to be obtained due to the emergent nature of this procedure. Procedure: The patient was positioned appropriately and the skin over the right femoral vein was prepped with chlorhexidine and draped in a sterile fashion.  Local anesthesia was not performed due to the emergent nature of this procedure. A large bore needle was used to identify the vein. A guide wire was then inserted into the vein through the needle. A cordis was then inserted into the vessel over the guide wire using the Seldinger technique. All ports showed good, free flowing blood return and were flushed with saline solution. The catheter was then securely fastened to the skin with sutures and covered with a sterile dressing. A post procedure X-ray was not indicated. The patient tolerated the procedure well. Complications: None          MDM  Number of Diagnoses or Management Options  Alcoholic cirrhosis, unspecified whether ascites present ([address]  Esophageal varices with bleeding in diseases classified elsewhere [facility])  Gastrointestinal hemorrhage, unspecified gastrointestinal hemorrhage type  Mediastinal mass  Diagnosis management comments: Patient is a 53-year-old male past medical history of alcohol abuse. Patient presents with chief complaint of abdominal pain, back pain as well as episodes of nausea and vomiting. Patient tachycardic and hypertensive on arrival.  On physical exam heart tachycardic but regular rhythm, lungs clear to auscultation laterally, abdomen no significant distention and tenderness with peritoneal signs. Scleral icterus present generalized jaundice present as well.  2 large-bore peripheral lines obtained IV fluid resuscitation begun laboratory work obtained CBC demonstrated hemoglobin 9.8, BMP obtained demonstrated glucose 145, hepatic function panel obtained demonstrated elevated alk phos 173, ALT 63, , bilirubin 2.5 direct 1.7 indirect 0.8, lactic acid 7.0, lipase 94, proBNP 75.  Due to chest pain and back pain decision made to obtain CTA of the chest as well as CT of the abdomen pelvis. However when patient was escorted over the CT scan he began to have multiple episodes of hematemesis with bright red blood totaling approximately 400 cc.   Patient brought back to the emergency department. Patient hypotensive findings concerning for variceal hemorrhage Cordis placed to right femoral please see above corresponding procedure note. Patient tolerated procedure well.  2 units of uncrossed blood ordered. Stat consult placed to general surgery and GI. Patient responded well to blood products and IV fluids. Patient was stable enough to obtain CTAs of the chest as well as abdomen pelvis. CT of the chest did not demonstrate any evidence of PE there was a mass in the posterior mediastinum as well as artifact with concern for possible acing aortic dissection. This was discussed with CT surgery who agreed that this is most likely artifact but did recommend a CTA of the chest with dedicated gating for the ascending aorta. Patient started on Protonix, Rocephin as well as octreotide. General surgery and GI present at the bedside. Decision made to take patient for emergent endoscopy. Plan was made to initially admit patient at [facility] was discussed with intensivist however during endoscopy patient was found to have variceal hemorrhage with unsuccessful variceal banding. Blakemore tube was placed. Patient requiring IR for TIPS procedure emergently. Patient brought back to the emergency department for emergent transfer to [facility]. Case discussed with IR who agreed to perform procedure. Case discussed with intensive care as well as admitting and emergency medicine at [facility] who are aware of patient being transferred. Findings concerning for massive GI hemorrhage secondary to variceal hemorrhage due to alcohol abuse. Findings discussed with patient as well as wife at the bedside. Patient transferred to [facility] in critical condition by medevac.         Amount and/or Complexity of Data Reviewed  Clinical lab tests: ordered and reviewed  Tests in the radiology section of CPT®: ordered and reviewed    Risk of Complications, Morbidity, and/or Mortality  Presenting problems: moderate  Diagnostic procedures: moderate  Management options: moderate    Patient Progress  Patient progress: stable       ED Course as of 02/07/22 0517   Mon Feb 07, 2022   9313 Consulted with Dr. Darin Sawant, surgery, who came to ED to assess patient. [KG]   2442 Consulted with Dr. Danley Fleischer, fellow with GI, who will review the labs and then decide if he is coming up. Denis Amagon Dr. Darin Sawant is taking patient to EGD emergently. He states that he will call Dr. Beulah Armstrong if needed. [KG]   [id number] Consulted with Dr. Lucia Whipple, ICU, who accepted for admission [KG]   0147 Consulted with Dr. Magalie Baron, covering for Dr. Kyler Thomas, who accepted for admission. [KG]   2765 KQDA with CT surgery. Discussed CTA findings. They recommend that patient have a repeat CTA with gated study for the a sending aorta they agree that this is most likely artifactual.  No need for urgent transfer at this time. Low suspicion for aortoenteric fistula. [BP]   [phone] with Dr. Darin Sawant who called me from the EGD suite and states that they could not get the patient's bleeding under control and he needs to go to [facility] for a TIPS procedure. Blakemore tube was placed and patient was intubated in the OR. We are arranging transport for patient to go to [facility]. Dr. Darin Sawant is speaking with IR and we are working on getting transport here to get the patient transferred to the surgical ICU if a bed is available. If not patient will go to the ER until he is able to get his procedure performed in IR. [KG]   100 [facility] with Dr. Ashley Ly, IR, who states that he will call the team out when the patient arrives.  I told him the patient is currently being flown to [facility].  [KG]      ED Course User Index  [BP] Rodrigo Griffin DO  [KG] Smitha Nathan DO      --------------------------------------------- PAST HISTORY ---------------------------------------------  Past Medical History:  has no past medical history on file. Past Surgical History:  has no past surgical history on file. Social History:      Family History: family history is not on file. The patients home medications have been reviewed. Allergies: Patient has no known allergies.     -------------------------------------------------- RESULTS -------------------------------------------------    Lab  Results for orders placed or performed during the hospital encounter of 02/06/22   COVID-19, Rapid    Specimen: Nasopharyngeal Swab   Result Value Ref Range    SARS-CoV-2, NAAT Not Detected Not Detected   CBC Auto Differential   Result Value Ref Range    WBC 7.0 4.5 - 11.5 E9/L    RBC 3.21 (L) 3.80 - 5.80 E12/L    Hemoglobin 9.8 (L) 12.5 - 16.5 g/dL    Hematocrit 29.8 (L) 37.0 - 54.0 %    MCV 92.8 80.0 - 99.9 fL    MCH 30.5 26.0 - 35.0 pg    MCHC 32.9 32.0 - 34.5 %    RDW 18.2 (H) 11.5 - 15.0 fL    Platelets 971 247 - 080 E9/L    MPV 10.6 7.0 - 12.0 fL    Neutrophils % 73.6 43.0 - 80.0 %    Immature Granulocytes % 0.6 0.0 - 5.0 %    Lymphocytes % 14.8 (L) 20.0 - 42.0 %    Monocytes % 8.7 2.0 - 12.0 %    Eosinophils % 1.0 0.0 - 6.0 %    Basophils % 1.3 0.0 - 2.0 %    Neutrophils Absolute 5.16 1.80 - 7.30 E9/L    Immature Granulocytes # 0.04 E9/L    Lymphocytes Absolute 1.04 (L) 1.50 - 4.00 E9/L    Monocytes Absolute 0.61 0.10 - 0.95 E9/L    Eosinophils Absolute 0.07 0.05 - 0.50 E9/L    Basophils Absolute 0.09 0.00 - 0.20 C7/K   Basic Metabolic Panel w/ Reflex to MG   Result Value Ref Range    Sodium 135 132 - 146 mmol/L    Potassium reflex Magnesium 3.7 3.5 - 5.0 mmol/L    Chloride 100 98 - 107 mmol/L    CO2 19 (L) 22 - 29 mmol/L    Anion Gap 16 7 - 16 mmol/L    Glucose 145 (H) 74 - 99 mg/dL    BUN 7 6 - 20 mg/dL    CREATININE 1.0 0.7 - 1.2 mg/dL    GFR Non-African American >60 >=60 mL/min/1.73    GFR African American >60     Calcium 8.3 (L) 8.6 - 10.2 mg/dL   Hepatic Function Panel   Result Value Ref Range    Total Protein 7.6 6.4 - 8.3 g/dL Albumin 2.8 (L) 3.5 - 5.2 g/dL    Alkaline Phosphatase 173 (H) 40 - 129 U/L    ALT 63 (H) 0 - 40 U/L     (H) 0 - 39 U/L    Total Bilirubin 2.5 (H) 0.0 - 1.2 mg/dL    Bilirubin, Direct 1.7 (H) 0.0 - 0.3 mg/dL    Bilirubin, Indirect 0.8 0.0 - 1.0 mg/dL   Lipase   Result Value Ref Range    Lipase 94 (H) 13 - 60 U/L   Troponin   Result Value Ref Range    Troponin, High Sensitivity 16 (H) 0 - 11 ng/L   Brain Natriuretic Peptide   Result Value Ref Range    Pro-BNP 75 0 - 125 pg/mL   Protime-INR   Result Value Ref Range    Protime 17.2 (H) 9.3 - 12.4 sec    INR 1.6    APTT   Result Value Ref Range    aPTT 30.8 24.5 - 35.1 sec   Lactic Acid, Plasma   Result Value Ref Range    Lactic Acid 7.0 (HH) 0.5 - 2.2 mmol/L   TYPE AND SCREEN   Result Value Ref Range    ABO/Rh O POS     Antibody Screen NEG    PREPARE RBC (CROSSMATCH)   Result Value Ref Range    Product Code Blood Bank L4538I52     Description Blood Bank Red Blood Cells, Leuko-reduced     Unit Number [id number]     Dispense Status Blood Bank issued     Product Code Blood Bank [id number]     Description Blood Bank Red Blood Cells, Apheresis, Leuko-reduced     Unit Number [id number]     Dispense Status Blood Bank issued     Product Code Blood Bank [id number]     Description Blood Bank Red Blood Cells, Leuko-reduced     Unit Number [id number]     Dispense Status Blood Bank issued     Product Code Blood Bank [id number]     Description Blood Bank Red Blood Cells, Leuko-reduced     Unit Number [id number]     Dispense Status Blood Bank issued     Product Code Blood Bank [id number]     Description Blood Bank Red Blood Cells, Leuko-reduced     Unit Number [id number]     Dispense Status Blood Bank selected     Product Code Blood Bank R9087I43     Description Blood Bank Red Blood Cells, Leuko-reduced     Unit Number [id number]     Dispense Status Blood Bank selected    PREPARE FRESH FROZEN PLASMA   Result Value Ref Range    Product Code Blood Bank [id number] Description Blood Bank Plasma, Apheresis, 5 Day, Thawed     Unit Number [id number]     Dispense Status Blood Bank issued        Radiology  CTA CHEST W CONTRAST   Final Result   1. There is a linear shadow through the anterior margin of ascending aorta   and proximal aortic arch which is probably artifactual from pulsations   although it is difficult to completely exclude a subtle aortic dissection. There is no evidence of dissection involving the distal arch or descending   thoracic aorta. 2. No pulmonary embolus seen. 3. There is a 3.7 x 3.9 x 4.7 cm mass in the posteromedial lower left   hemithorax, paraspinal region abutting descending thoracic aorta. Malignancy   not excluded. This this could be a pleural base mass or mass of neurogenic   origin. 4. Hepatic steatosis. CTA ABDOMEN PELVIS W CONTRAST   Final Result   Intact abdominal and pelvic arteries with no aneurysm or dissection. A rounded masslike lesion in the left posterior mediastinum. Please refer to   the report of CTA of the chest for details. Marked diffuse hepatic steatosis. Normal appendix. Distal colonic diverticulosis. No acute diverticulitis. Trace ascites. XR CHEST PORTABLE   Final Result   No acute process. XR ABDOMEN (KUB) (SINGLE AP VIEW)   Final Result   No free air seen although patient is semi upright rather than upright which   limits evaluation.                 ------------------------- NURSING NOTES AND VITALS REVIEWED ---------------------------  Date / Time Roomed:  2/6/2022 11:49 PM  ED Bed Assignment:  22/22    The nursing notes within the ED encounter and vital signs as below have been reviewed.    Patient Vitals for the past 24 hrs:   BP Temp Temp src Pulse Resp SpO2 Height Weight   02/07/22 0440 (!) 180/88 -- -- 130 -- 100 % -- --   02/07/22 0430 (!) 184/8 -- -- 124 -- 100 % -- --   02/07/22 0138 125/71 99 °F (37.2 °C) Oral 93 -- 95 % -- --   02/07/22 0136 125/71 -- -- 92 20 -- -- --   02/07/22 0039 117/69 -- -- 114 20 -- -- --   02/07/22 0024 96/61 -- -- 102 22 -- -- --   02/07/22 0021 104/63 -- -- 102 -- -- -- --   02/07/22 0009 (!) 91/56 -- -- 102 22 94 % -- --   02/07/22 0003 -- -- -- 102 -- -- -- --   02/07/22 0000 (!) 75/57 -- -- 99 20 91 % -- --   02/06/22 2347 (!) 96/55 97.6 °F (36.4 °C) Oral (!) 211 16 97 % 5' 8\" (1.727 m) 240 lb (108.9 kg)       Oxygen Saturation Interpretation: Normal      ------------------------------------------ PROGRESS NOTES ------------------------------------------  Re-evaluation(s):  Time: 0130. Patients symptoms are worsening  Repeat physical examination is worsened      I have spoken with the patient and wife  and discussed todays results, in addition to providing specific details for the plan of care and counseling regarding the diagnosis and prognosis. Their questions are answered at this time and they are agreeable with the plan. I have discussed the risks and benefits of transfer and they wish to proceed with the transfer. --------------------------------- ADDITIONAL PROVIDER NOTES ---------------------------------  Consultations:  Spoke with [facility] (Medicine). Discussed case. They will admit this patient. Reason for transfer: Variceal hemorrhage. This patient's ED course included: a personal history and physicial examination, re-evaluation prior to disposition, multiple bedside re-evaluations, IV medications, cardiac monitoring, continuous pulse oximetry and complex medical decision making and emergency management    This patient has initially deteriorated, but then stabilized during their ED course. Please note that the withdrawal or failure to initiate urgent interventions for this patient would likely result in a life threatening deterioration or permanent disability. Clinical Impression  1. Gastrointestinal hemorrhage, unspecified gastrointestinal hemorrhage type    2.  Esophageal varices with bleeding in

## 2022-02-07 NOTE — CONSULTS
History and Physical      ASSESSMENT AND PLAN:  46y/M w/ history of EtOH abuse presents w/ acute esophageal variceal bleed s/p EGD w/ banding which was unable to stop the active bleeding now s/p TIPS placement. DF: 22.7  MELD: 18    PLAN:  1. Esophageal Variceal Hemorrhage:  -S/p EGD w/ banding unsuccessful at stopping the bleed. -S/p definitive treatment w/ TIPS. -Carefully monitor vitals for signs of hemodynamic instability.  -Trend Hgb BID. -Transfuse to Hgb >7. Keep INR<1.5, Plts>50, and Fibrinogen >220.  -PPI gtt and Octretide gtt should continue for 72hours from Monday morning.   -5 day course of Ceftriaxone  -Michelle Youssef currently has both esophageal and gastric balloons down. If evidence of rebleeding, insufflate gastric balloon only and place to traction.   -If no signs of rebleeding, repeat EGD will not be necessary this admission. 2. Acute Alcoholic Hepatitis vs EtOH Cirrhosis:  -Monitor CBC, CMP, and INR daily  -Please use Albumin 25g q 8hours as maintenance fluids.   -Please only use IVFs if needs for stabilization of sudden changes in hemodynamics.  -No role for steroids. 3. Ascites:  -In the setting of portal hypertension and necessary aggressive blood product and IVF administration for stabilization.  -He will require IR-guided paracentesis at some point. This may be required sooner rather than later if ascites affects ventilation and lung volumes.   -When performed, please send for Cell Count w/ Diff, Albumin, and Culture. -After 48 hours of stabilization, consider starting low-dose Lasix and Aldactone and slowly uptitrating as BP and Cr allow to 40mg Lasix and 100mg Aldactone. 4. Hepatic Encephalopathy:  Patient is very high risk w/ massive GI bleed and placement of TIPS. -While intubated, would favor placement of FCS and beginning lactulose titrated to 1.5L of stool output daily.  Can also be titrated to 3-4 BMs daily, but this can be difficult to monitor in ICU setting.   -Begin Rifaximin 550mg BID    I will follow closely. Thank you for including us in the care of this patient. Please do not hesitate to contact us with any additional questions or concerns. Saeed Krueger MD  Gastroenterology/Hepatology  [facility]          HISTORY OF PRESENT ILLNESS:      Mr. Ramon Jauregui is a 46y/M w/ history of daily EtOH use who presented to [facility] w/ sudden onset abdominal pain and several episodes of hematemesis. He required [facility] and underwent EGD on 2/7 which showed significant amount of blood/clot in the stomach and several columns of esophageal varices s/p banding w/ total 14 bands fired with several misplaced bands in the setting of active hemorrhage. Bleeding was noted to be persistent following the procedure and a Blakemore tube was placed. He was transferred to [facility] for placement of a TIPS which was performed on 2/7. The patient is seen intubated in the SICU. Denia Newburg remains in place though the esophageal and gastric balloons are deflated. There is no further hematemesis appreciated. The patient remains afebrile and BP remains in the 30F-514Y systolic. Labs show a downtrending Hgb from 11.6 to 9.2. BUN/Cr ratio remains low. Plts are normal. INR is 1.5. CT imaging on admission shows marked fatty infiltration of the liver. Discriminant Function on Admission: 22.7  MELD on Admission: 18    Past Medical History:    Daily EtOH use    Past Surgical History:        Procedure Laterality Date    UPPER GASTROINTESTINAL ENDOSCOPY N/A 2/7/2022    EGD ESOPHAGOGASTRODUODENOSCOPY ESOPHAGEAL BANDING, , INSERTION OF SENGSTAKEN-BLAKEMORE TUBE performed by Renea Santos MD at [address] History:    Drinks vodka daily. No tobacco or illicit drug use. Family History:   Unable to obtain as patient is intubated.     Current Facility-Administered Medications:     propofol injection, 5-50 mcg/kg/min, IntraVENous, Titrated, Jackie Keene DO, Last Rate: 26.1 mL/hr at 02/07/22 1838, 40 mcg/kg/min at 02/07/22 1838    fentaNYL 5 mcg/ml in 0.9%  ml infusion, 12.5-200 mcg/hr, IntraVENous, Continuous, Gianmarino C Gianfrate, DO, Last Rate: 25 mL/hr at 02/07/22 1557, 125 mcg/hr at 02/07/22 1557    ondansetron (ZOFRAN-ODT) disintegrating tablet 4 mg, 4 mg, Oral, Q8H PRN **OR** ondansetron (ZOFRAN) injection 4 mg, 4 mg, IntraVENous, Q6H PRN, Gianmarino C Gianfrate, DO    octreotide (SANDOSTATIN) 500 mcg in sodium chloride 0.9 % 100 mL infusion, 25 mcg/hr, IntraVENous, Continuous, Gianmarino C Gianfrate, DO, Last Rate: 5 mL/hr at 02/07/22 0651, 25 mcg/hr at 02/07/22 0651    0.9 % sodium chloride infusion, 25 mL, IntraVENous, PRN, ALEJANDRA Roman    acetaminophen (TYLENOL) tablet 650 mg, 650 mg, Oral, Q6H PRN **OR** acetaminophen (TYLENOL) suppository 650 mg, 650 mg, Rectal, Q6H PRN, ALEJANDRA Roman    magnesium hydroxide (MILK OF MAGNESIA) 400 MG/5ML suspension 30 mL, 30 mL, Oral, Daily PRN, ALEJANDRA Roman    potassium chloride (KLOR-CON M) extended release tablet 40 mEq, 40 mEq, Oral, PRN **OR** potassium bicarb-citric acid (EFFER-K) effervescent tablet 40 mEq, 40 mEq, Oral, PRN **OR** potassium chloride 10 mEq/100 mL IVPB (Peripheral Line), 10 mEq, IntraVENous, PRN, ALEJANDRA Roman    sodium chloride flush 0.9 % injection 10 mL, 10 mL, IntraVENous, 2 times per day, ALEJANDRA Clark, 10 mL at 02/07/22 1050    sodium chloride flush 0.9 % injection 10 mL, 10 mL, IntraVENous, PRN, ALEJANDAR Roman    trimethobenzamide (TIGAN) injection 200 mg, 200 mg, IntraMUSCular, Q6H PRN, ALEJANDRA Roman    pantoprazole (PROTONIX) injection 40 mg, 40 mg, IntraVENous, BID, Jaswinder Hebert MD, 40 mg at 02/07/22 1032    glucose (GLUTOSE) 40 % oral gel 15 g, 15 g, Oral, PRN, Aiden Mcgregor MD    dextrose 50 % IV solution, 12.5 g, IntraVENous, PRN, Aiden Mcgregor MD    glucagon (rDNA) injection 1 mg, 1 mg, IntraMUSCular, PRN, iAden Mcgregor MD    dextrose 5 % solution, 100 mL/hr, IntraVENous, PRN, Jesi Asif MD    chlorhexidine (PERIDEX) 0.12 % solution 15 mL, 15 mL, Mouth/Throat, BID, Viraj Armendariz MD, 15 mL at 02/07/22 1032    polyvinyl alcohol (LIQUIFILM TEARS) 1.4 % ophthalmic solution 1 drop, 1 drop, Both Eyes, Q4H, 1 drop at 02/07/22 1717 **AND** lubrifresh P.M. (artificial tears) ophthalmic ointment, , Both Eyes, Q4H, Viraj Armendariz MD, Given at 02/07/22 1829    PHENobarbital (LUMINAL) injection 260 mg, 260 mg, IntraVENous, Q6H, 260 mg at 02/07/22 1721 **FOLLOWED BY** [START ON 2/8/2022] PHENobarbital (LUMINAL) injection 130 mg, 130 mg, IntraVENous, Q6H, Viraj Armendariz MD    thiamine (B-1) injection 100 mg, 100 mg, IntraVENous, Daily, Jesi Asif MD, 100 mg at 87/32/88 3902    folic acid injection 1 mg, 1 mg, IntraVENous, Daily, Jesi Asif MD, 1 mg at 02/07/22 1234    sodium bicarbonate 150 mEq in sterile water 1,000 mL infusion, , IntraVENous, Continuous, Jesi Asif MD, Last Rate: 100 mL/hr at 02/07/22 1233, New Bag at 02/07/22 1233    cefTRIAXone (ROCEPHIN) 1,000 mg in sterile water 10 mL IV syringe, 1,000 mg, IntraVENous, Q24H, Kirt Flores MD     Allergies:  Patient has no known allergies. ROS:  Unable to obtain as patient is intubated. PHYSICAL EXAM:  /85   Pulse 97   Temp 99.6 °F (37.6 °C) (Axillary)   Resp 22   Ht 5' 8\" (1.727 m)   Wt 261 lb 3.9 oz (118.5 kg)   SpO2 98%   BMI 39.72 kg/m²   Physical Exam:  General: Sedated, intubated. HEENT: ET tube in place, Denia Mobile in place through L nare  Cards: RRR, no LE edema  Resp: Synchronous w/ vent, FiO2 50%, PEEP 8  Abdomen: soft, distended w/ appreciable ascites  Extremities: No effusions or significant bruising  Skin: No rashes or jaundice  Neuro: Sedated, intubated. No clonus.               Electronically signed by Saeed Krueger MD on 2/7/2022 at 6:53 PM

## 2022-02-07 NOTE — PROGRESS NOTES
[address] [facility]  0500 Access center called patient to go to IR first, then to ICU [id number] Call Nurse to Nurse to [phone]

## 2022-02-08 ENCOUNTER — APPOINTMENT (OUTPATIENT)
Dept: GENERAL RADIOLOGY | Age: 47
DRG: 405 | End: 2022-02-08
Attending: INTERNAL MEDICINE
Payer: COMMERCIAL

## 2022-02-08 PROBLEM — K72.00 SHOCK LIVER: Status: ACTIVE | Noted: 2022-02-08

## 2022-02-08 LAB
AADO2: 228.7 MMHG
ALBUMIN SERPL-MCNC: 2 G/DL (ref 3.5–5.2)
ALBUMIN SERPL-MCNC: 2.4 G/DL (ref 3.5–5.2)
ALBUMIN SERPL-MCNC: 2.7 G/DL (ref 3.5–5.2)
ALP BLD-CCNC: 126 U/L (ref 40–129)
ALP BLD-CCNC: 131 U/L (ref 40–129)
ALP BLD-CCNC: 142 U/L (ref 40–129)
ALT SERPL-CCNC: 527 U/L (ref 0–40)
ALT SERPL-CCNC: 553 U/L (ref 0–40)
ALT SERPL-CCNC: 561 U/L (ref 0–40)
AMMONIA: 173 UMOL/L (ref 16–60)
ANION GAP SERPL CALCULATED.3IONS-SCNC: 12 MMOL/L (ref 7–16)
ANION GAP SERPL CALCULATED.3IONS-SCNC: 14 MMOL/L (ref 7–16)
ANION GAP SERPL CALCULATED.3IONS-SCNC: 9 MMOL/L (ref 7–16)
AST SERPL-CCNC: 1955 U/L (ref 0–39)
AST SERPL-CCNC: 1995 U/L (ref 0–39)
AST SERPL-CCNC: 2104 U/L (ref 0–39)
B.E.: 6.6 MMOL/L (ref -3–3)
BILIRUB SERPL-MCNC: 3 MG/DL (ref 0–1.2)
BILIRUB SERPL-MCNC: 3.1 MG/DL (ref 0–1.2)
BILIRUB SERPL-MCNC: 3.1 MG/DL (ref 0–1.2)
BUN BLDV-MCNC: 22 MG/DL (ref 6–20)
BUN BLDV-MCNC: 25 MG/DL (ref 6–20)
BUN BLDV-MCNC: 28 MG/DL (ref 6–20)
CALCIUM IONIZED: 1.11 MMOL/L (ref 1.15–1.33)
CALCIUM SERPL-MCNC: 7.6 MG/DL (ref 8.6–10.2)
CALCIUM SERPL-MCNC: 7.6 MG/DL (ref 8.6–10.2)
CALCIUM SERPL-MCNC: 8 MG/DL (ref 8.6–10.2)
CHLORIDE BLD-SCNC: 102 MMOL/L (ref 98–107)
CHLORIDE BLD-SCNC: 103 MMOL/L (ref 98–107)
CHLORIDE BLD-SCNC: 104 MMOL/L (ref 98–107)
CO2: 25 MMOL/L (ref 22–29)
CO2: 29 MMOL/L (ref 22–29)
CO2: 32 MMOL/L (ref 22–29)
COHB: 0.3 % (ref 0–1.5)
CREAT SERPL-MCNC: 0.8 MG/DL (ref 0.7–1.2)
CREAT SERPL-MCNC: 0.9 MG/DL (ref 0.7–1.2)
CREAT SERPL-MCNC: 1 MG/DL (ref 0.7–1.2)
CRITICAL: ABNORMAL
DATE ANALYZED: ABNORMAL
DATE OF COLLECTION: ABNORMAL
FIO2: 50 %
GFR AFRICAN AMERICAN: >60
GFR NON-AFRICAN AMERICAN: >60 ML/MIN/1.73
GLUCOSE BLD-MCNC: 123 MG/DL (ref 74–99)
GLUCOSE BLD-MCNC: 139 MG/DL (ref 74–99)
GLUCOSE BLD-MCNC: 164 MG/DL (ref 74–99)
HCO3: 28.6 MMOL/L (ref 22–26)
HCT VFR BLD CALC: 24.8 % (ref 37–54)
HCT VFR BLD CALC: 25.4 % (ref 37–54)
HCT VFR BLD CALC: 26.7 % (ref 37–54)
HEMOGLOBIN: 8.2 G/DL (ref 12.5–16.5)
HEMOGLOBIN: 8.3 G/DL (ref 12.5–16.5)
HEMOGLOBIN: 8.7 G/DL (ref 12.5–16.5)
HHB: 4.5 % (ref 0–5)
INR BLD: 2.1
LAB: ABNORMAL
LACTIC ACID: 2.1 MMOL/L (ref 0.5–2.2)
LACTIC ACID: 3.2 MMOL/L (ref 0.5–2.2)
LACTIC ACID: 4.5 MMOL/L (ref 0.5–2.2)
LACTIC ACID: 4.6 MMOL/L (ref 0.5–2.2)
Lab: ABNORMAL
MAGNESIUM: 1.6 MG/DL (ref 1.6–2.6)
MCH RBC QN AUTO: 30.4 PG (ref 26–35)
MCH RBC QN AUTO: 30.4 PG (ref 26–35)
MCH RBC QN AUTO: 30.6 PG (ref 26–35)
MCHC RBC AUTO-ENTMCNC: 32.6 % (ref 32–34.5)
MCHC RBC AUTO-ENTMCNC: 32.7 % (ref 32–34.5)
MCHC RBC AUTO-ENTMCNC: 33.1 % (ref 32–34.5)
MCV RBC AUTO: 92.5 FL (ref 80–99.9)
MCV RBC AUTO: 93 FL (ref 80–99.9)
MCV RBC AUTO: 93.4 FL (ref 80–99.9)
METHB: 0 % (ref 0–1.5)
MODE: AC
MRSA CULTURE ONLY: NORMAL
O2 SATURATION: 95.5 % (ref 92–98.5)
O2HB: 95.2 % (ref 94–97)
OPERATOR ID: 7221
PATIENT TEMP: 37 C
PCO2: 31.3 MMHG (ref 35–45)
PDW BLD-RTO: 18.2 FL (ref 11.5–15)
PDW BLD-RTO: 18.2 FL (ref 11.5–15)
PDW BLD-RTO: 18.5 FL (ref 11.5–15)
PEEP/CPAP: 8 CMH2O
PFO2: 1.6 MMHG/%
PH BLOOD GAS: 7.58 (ref 7.35–7.45)
PHENOBARBITAL LEVEL: 14.7 MCG/ML (ref 15–40)
PHOSPHORUS: 3.1 MG/DL (ref 2.5–4.5)
PLATELET # BLD: 117 E9/L (ref 130–450)
PLATELET # BLD: 117 E9/L (ref 130–450)
PLATELET # BLD: 120 E9/L (ref 130–450)
PMV BLD AUTO: 10.9 FL (ref 7–12)
PMV BLD AUTO: 11 FL (ref 7–12)
PMV BLD AUTO: 11.3 FL (ref 7–12)
PO2: 80.1 MMHG (ref 75–100)
POTASSIUM REFLEX MAGNESIUM: 4 MMOL/L (ref 3.5–5)
POTASSIUM REFLEX MAGNESIUM: 4 MMOL/L (ref 3.5–5)
POTASSIUM REFLEX MAGNESIUM: 4.6 MMOL/L (ref 3.5–5)
PROTHROMBIN TIME: 22.8 SEC (ref 9.3–12.4)
RBC # BLD: 2.68 E12/L (ref 3.8–5.8)
RBC # BLD: 2.73 E12/L (ref 3.8–5.8)
RBC # BLD: 2.86 E12/L (ref 3.8–5.8)
RI(T): 2.86
RR MECHANICAL: 22 B/MIN
SODIUM BLD-SCNC: 143 MMOL/L (ref 132–146)
SODIUM BLD-SCNC: 143 MMOL/L (ref 132–146)
SODIUM BLD-SCNC: 144 MMOL/L (ref 132–146)
SOURCE, BLOOD GAS: ABNORMAL
THB: 9.8 G/DL (ref 11.5–16.5)
TIME ANALYZED: 451
TOTAL PROTEIN: 5.9 G/DL (ref 6.4–8.3)
TOTAL PROTEIN: 6.1 G/DL (ref 6.4–8.3)
TOTAL PROTEIN: 6.4 G/DL (ref 6.4–8.3)
VT MECHANICAL: 500 ML
WBC # BLD: 10.1 E9/L (ref 4.5–11.5)
WBC # BLD: 9.2 E9/L (ref 4.5–11.5)
WBC # BLD: 9.5 E9/L (ref 4.5–11.5)

## 2022-02-08 PROCEDURE — 6360000002 HC RX W HCPCS: Performed by: SURGERY

## 2022-02-08 PROCEDURE — 71045 X-RAY EXAM CHEST 1 VIEW: CPT

## 2022-02-08 PROCEDURE — 6360000002 HC RX W HCPCS: Performed by: INTERNAL MEDICINE

## 2022-02-08 PROCEDURE — 2580000003 HC RX 258: Performed by: PHYSICIAN ASSISTANT

## 2022-02-08 PROCEDURE — C9113 INJ PANTOPRAZOLE SODIUM, VIA: HCPCS | Performed by: INTERNAL MEDICINE

## 2022-02-08 PROCEDURE — 80184 ASSAY OF PHENOBARBITAL: CPT

## 2022-02-08 PROCEDURE — 6370000000 HC RX 637 (ALT 250 FOR IP): Performed by: STUDENT IN AN ORGANIZED HEALTH CARE EDUCATION/TRAINING PROGRAM

## 2022-02-08 PROCEDURE — 85027 COMPLETE CBC AUTOMATED: CPT

## 2022-02-08 PROCEDURE — 6360000002 HC RX W HCPCS: Performed by: STUDENT IN AN ORGANIZED HEALTH CARE EDUCATION/TRAINING PROGRAM

## 2022-02-08 PROCEDURE — 83605 ASSAY OF LACTIC ACID: CPT

## 2022-02-08 PROCEDURE — 2500000003 HC RX 250 WO HCPCS: Performed by: STUDENT IN AN ORGANIZED HEALTH CARE EDUCATION/TRAINING PROGRAM

## 2022-02-08 PROCEDURE — 82330 ASSAY OF CALCIUM: CPT

## 2022-02-08 PROCEDURE — 84100 ASSAY OF PHOSPHORUS: CPT

## 2022-02-08 PROCEDURE — 2000000000 HC ICU R&B

## 2022-02-08 PROCEDURE — 02HV33Z INSERTION OF INFUSION DEVICE INTO SUPERIOR VENA CAVA, PERCUTANEOUS APPROACH: ICD-10-PCS | Performed by: SURGERY

## 2022-02-08 PROCEDURE — 6370000000 HC RX 637 (ALT 250 FOR IP): Performed by: SURGERY

## 2022-02-08 PROCEDURE — 2580000003 HC RX 258: Performed by: STUDENT IN AN ORGANIZED HEALTH CARE EDUCATION/TRAINING PROGRAM

## 2022-02-08 PROCEDURE — 37799 UNLISTED PX VASCULAR SURGERY: CPT

## 2022-02-08 PROCEDURE — 36415 COLL VENOUS BLD VENIPUNCTURE: CPT

## 2022-02-08 PROCEDURE — 82140 ASSAY OF AMMONIA: CPT

## 2022-02-08 PROCEDURE — 36556 INSERT NON-TUNNEL CV CATH: CPT

## 2022-02-08 PROCEDURE — P9047 ALBUMIN (HUMAN), 25%, 50ML: HCPCS

## 2022-02-08 PROCEDURE — 94003 VENT MGMT INPAT SUBQ DAY: CPT

## 2022-02-08 PROCEDURE — 2580000003 HC RX 258: Performed by: INTERNAL MEDICINE

## 2022-02-08 PROCEDURE — 80053 COMPREHEN METABOLIC PANEL: CPT

## 2022-02-08 PROCEDURE — P9047 ALBUMIN (HUMAN), 25%, 50ML: HCPCS | Performed by: SURGERY

## 2022-02-08 PROCEDURE — 2580000003 HC RX 258: Performed by: SURGERY

## 2022-02-08 PROCEDURE — 85610 PROTHROMBIN TIME: CPT

## 2022-02-08 PROCEDURE — 76937 US GUIDE VASCULAR ACCESS: CPT | Performed by: SURGERY

## 2022-02-08 PROCEDURE — 6360000002 HC RX W HCPCS

## 2022-02-08 PROCEDURE — 99291 CRITICAL CARE FIRST HOUR: CPT | Performed by: SURGERY

## 2022-02-08 PROCEDURE — 83735 ASSAY OF MAGNESIUM: CPT

## 2022-02-08 PROCEDURE — 82805 BLOOD GASES W/O2 SATURATION: CPT

## 2022-02-08 PROCEDURE — 36556 INSERT NON-TUNNEL CV CATH: CPT | Performed by: SURGERY

## 2022-02-08 PROCEDURE — 99232 SBSQ HOSP IP/OBS MODERATE 35: CPT | Performed by: STUDENT IN AN ORGANIZED HEALTH CARE EDUCATION/TRAINING PROGRAM

## 2022-02-08 PROCEDURE — 2500000003 HC RX 250 WO HCPCS: Performed by: SURGERY

## 2022-02-08 RX ORDER — ALBUMIN (HUMAN) 12.5 G/50ML
SOLUTION INTRAVENOUS
Status: COMPLETED
Start: 2022-02-08 | End: 2022-02-08

## 2022-02-08 RX ORDER — LACTULOSE 10 G/15ML
20 SOLUTION ORAL
Status: DISCONTINUED | OUTPATIENT
Start: 2022-02-08 | End: 2022-02-09

## 2022-02-08 RX ORDER — MAGNESIUM SULFATE IN WATER 40 MG/ML
4000 INJECTION, SOLUTION INTRAVENOUS ONCE
Status: COMPLETED | OUTPATIENT
Start: 2022-02-08 | End: 2022-02-08

## 2022-02-08 RX ORDER — THIAMINE HYDROCHLORIDE 100 MG/ML
100 INJECTION, SOLUTION INTRAMUSCULAR; INTRAVENOUS DAILY
Status: DISCONTINUED | OUTPATIENT
Start: 2022-02-11 | End: 2022-02-14

## 2022-02-08 RX ORDER — ALBUMIN (HUMAN) 12.5 G/50ML
25 SOLUTION INTRAVENOUS EVERY 8 HOURS
Status: DISCONTINUED | OUTPATIENT
Start: 2022-02-08 | End: 2022-02-08

## 2022-02-08 RX ORDER — ALBUMIN (HUMAN) 12.5 G/50ML
25 SOLUTION INTRAVENOUS EVERY 8 HOURS
Status: COMPLETED | OUTPATIENT
Start: 2022-02-08 | End: 2022-02-10

## 2022-02-08 RX ADMIN — CHLORHEXIDINE GLUCONATE 0.12% ORAL RINSE 15 ML: 1.2 LIQUID ORAL at 08:19

## 2022-02-08 RX ADMIN — ALBUMIN (HUMAN) 25 G: 0.25 INJECTION, SOLUTION INTRAVENOUS at 16:25

## 2022-02-08 RX ADMIN — PHENOBARBITAL SODIUM 130 MG: 65 INJECTION INTRAMUSCULAR at 16:26

## 2022-02-08 RX ADMIN — Medication 75 MCG/HR: at 14:12

## 2022-02-08 RX ADMIN — PANTOPRAZOLE SODIUM 40 MG: 40 INJECTION, POWDER, FOR SOLUTION INTRAVENOUS at 08:19

## 2022-02-08 RX ADMIN — PHENOBARBITAL SODIUM 130 MG: 65 INJECTION INTRAMUSCULAR at 10:23

## 2022-02-08 RX ADMIN — FOLIC ACID 1 MG: 5 INJECTION, SOLUTION INTRAMUSCULAR; INTRAVENOUS; SUBCUTANEOUS at 08:20

## 2022-02-08 RX ADMIN — LACTULOSE 20 G: 20 SOLUTION ORAL at 13:44

## 2022-02-08 RX ADMIN — POLYVINYL ALCOHOL 1 DROP: 14 SOLUTION/ DROPS OPHTHALMIC at 16:26

## 2022-02-08 RX ADMIN — MINERAL OIL AND WHITE PETROLATUM: 150; 830 OINTMENT OPHTHALMIC at 10:18

## 2022-02-08 RX ADMIN — Medication 10 ML: at 20:39

## 2022-02-08 RX ADMIN — ACETYLCYSTEINE 4460 MG: 200 INJECTION, SOLUTION INTRAVENOUS at 09:54

## 2022-02-08 RX ADMIN — ACETYLCYSTEINE 8940 MG: 200 INJECTION, SOLUTION INTRAVENOUS at 13:55

## 2022-02-08 RX ADMIN — MINERAL OIL AND WHITE PETROLATUM: 150; 830 OINTMENT OPHTHALMIC at 02:15

## 2022-02-08 RX ADMIN — Medication 10 ML: at 08:20

## 2022-02-08 RX ADMIN — PHENOBARBITAL SODIUM 260 MG: 65 INJECTION INTRAMUSCULAR at 04:57

## 2022-02-08 RX ADMIN — POLYVINYL ALCOHOL 1 DROP: 14 SOLUTION/ DROPS OPHTHALMIC at 04:30

## 2022-02-08 RX ADMIN — POLYVINYL ALCOHOL 1 DROP: 14 SOLUTION/ DROPS OPHTHALMIC at 12:36

## 2022-02-08 RX ADMIN — MINERAL OIL AND WHITE PETROLATUM: 150; 830 OINTMENT OPHTHALMIC at 14:09

## 2022-02-08 RX ADMIN — THIAMINE HYDROCHLORIDE 400 MG: 100 INJECTION, SOLUTION INTRAMUSCULAR; INTRAVENOUS at 13:27

## 2022-02-08 RX ADMIN — THIAMINE HYDROCHLORIDE 100 MG: 100 INJECTION, SOLUTION INTRAMUSCULAR; INTRAVENOUS at 08:19

## 2022-02-08 RX ADMIN — PROPOFOL 30 MCG/KG/MIN: 10 INJECTION, EMULSION INTRAVENOUS at 18:18

## 2022-02-08 RX ADMIN — LACTULOSE 20 G: 10 SOLUTION ORAL at 22:16

## 2022-02-08 RX ADMIN — PANTOPRAZOLE SODIUM 40 MG: 40 INJECTION, POWDER, FOR SOLUTION INTRAVENOUS at 20:41

## 2022-02-08 RX ADMIN — MINERAL OIL AND WHITE PETROLATUM: 150; 830 OINTMENT OPHTHALMIC at 18:12

## 2022-02-08 RX ADMIN — OCTREOTIDE ACETATE 25 MCG/HR: 500 INJECTION, SOLUTION INTRAVENOUS; SUBCUTANEOUS at 20:23

## 2022-02-08 RX ADMIN — MAGNESIUM SULFATE HEPTAHYDRATE 4000 MG: 40 INJECTION, SOLUTION INTRAVENOUS at 08:28

## 2022-02-08 RX ADMIN — LACTULOSE 20 G: 10 SOLUTION ORAL at 20:24

## 2022-02-08 RX ADMIN — LACTULOSE 20 G: 20 SOLUTION ORAL at 08:19

## 2022-02-08 RX ADMIN — PHYTONADIONE 10 MG: 10 INJECTION, EMULSION INTRAMUSCULAR; INTRAVENOUS; SUBCUTANEOUS at 18:50

## 2022-02-08 RX ADMIN — MINERAL OIL AND WHITE PETROLATUM: 150; 830 OINTMENT OPHTHALMIC at 05:17

## 2022-02-08 RX ADMIN — OCTREOTIDE ACETATE 25 MCG/HR: 500 INJECTION, SOLUTION INTRAVENOUS; SUBCUTANEOUS at 04:56

## 2022-02-08 RX ADMIN — RIFAXIMIN 550 MG: 550 TABLET ORAL at 20:38

## 2022-02-08 RX ADMIN — PROPOFOL 40 MCG/KG/MIN: 10 INJECTION, EMULSION INTRAVENOUS at 03:00

## 2022-02-08 RX ADMIN — ACETYLCYSTEINE 13400 MG: 200 INJECTION, SOLUTION INTRAVENOUS at 08:44

## 2022-02-08 RX ADMIN — PHENOBARBITAL SODIUM 130 MG: 65 INJECTION INTRAMUSCULAR at 22:17

## 2022-02-08 RX ADMIN — POLYVINYL ALCOHOL 1 DROP: 14 SOLUTION/ DROPS OPHTHALMIC at 08:20

## 2022-02-08 RX ADMIN — PROPOFOL 40 MCG/KG/MIN: 10 INJECTION, EMULSION INTRAVENOUS at 10:20

## 2022-02-08 RX ADMIN — MINERAL OIL AND WHITE PETROLATUM: 150; 830 OINTMENT OPHTHALMIC at 22:15

## 2022-02-08 RX ADMIN — CHLORHEXIDINE GLUCONATE 0.12% ORAL RINSE 15 ML: 1.2 LIQUID ORAL at 20:39

## 2022-02-08 RX ADMIN — RIFAXIMIN 550 MG: 550 TABLET ORAL at 09:11

## 2022-02-08 RX ADMIN — Medication 125 MCG/HR: at 01:31

## 2022-02-08 RX ADMIN — PROPOFOL 40 MCG/KG/MIN: 10 INJECTION, EMULSION INTRAVENOUS at 22:16

## 2022-02-08 RX ADMIN — PROPOFOL 40 MCG/KG/MIN: 10 INJECTION, EMULSION INTRAVENOUS at 14:12

## 2022-02-08 RX ADMIN — POLYVINYL ALCOHOL 1 DROP: 14 SOLUTION/ DROPS OPHTHALMIC at 20:23

## 2022-02-08 RX ADMIN — CALCIUM GLUCONATE 1000 MG: 98 INJECTION, SOLUTION INTRAVENOUS at 08:20

## 2022-02-08 RX ADMIN — CEFTRIAXONE 2000 MG: 2 INJECTION, POWDER, FOR SOLUTION INTRAMUSCULAR; INTRAVENOUS at 20:25

## 2022-02-08 RX ADMIN — ALBUMIN (HUMAN) 25 G: 12.5 SOLUTION INTRAVENOUS at 09:00

## 2022-02-08 RX ADMIN — ALBUMIN (HUMAN) 25 G: 0.25 INJECTION, SOLUTION INTRAVENOUS at 09:00

## 2022-02-08 ASSESSMENT — PAIN SCALES - GENERAL
PAINLEVEL_OUTOF10: 6
PAINLEVEL_OUTOF10: 0
PAINLEVEL_OUTOF10: 5
PAINLEVEL_OUTOF10: 5
PAINLEVEL_OUTOF10: 0
PAINLEVEL_OUTOF10: 5
PAINLEVEL_OUTOF10: 0

## 2022-02-08 ASSESSMENT — PULMONARY FUNCTION TESTS
PIF_VALUE: 25
PIF_VALUE: 27
PIF_VALUE: 29
PIF_VALUE: 29
PIF_VALUE: 27
PIF_VALUE: 27
PIF_VALUE: 26
PIF_VALUE: 33
PIF_VALUE: 28
PIF_VALUE: 26
PIF_VALUE: 30
PIF_VALUE: 33
PIF_VALUE: 30
PIF_VALUE: 28
PIF_VALUE: 27
PIF_VALUE: 26
PIF_VALUE: 28
PIF_VALUE: 26
PIF_VALUE: 30
PIF_VALUE: 30
PIF_VALUE: 28
PIF_VALUE: 29

## 2022-02-08 NOTE — CARE COORDINATION
Remains intubated and sedated on vent, Sandostatin drip cont's. Pt follows commands when off sedation. Both esophageal and gastric balloons deflated yesterday, with no evidence of ongoing hemorrhage. Pt didn't tolerate SBT this am. Nh3 173. Started on lactulose tid. Introduced myself to wife in room. Will follow up with her when pt more medically stable to determine discharge needs.

## 2022-02-08 NOTE — FLOWSHEET NOTE
Patient continues to pull at ETT, [facility] MARIANN More, A-line, both IVs, and other life saving lines/ tubes. After multiple attempts at reorientation, there was no evidence of learning. Bilateral wrist restraints applied for patient safety. There is  No evidence of injury noted at this time. Will continue to monitor.

## 2022-02-08 NOTE — FLOWSHEET NOTE
Patient continues to reach for ett and blakemore while performing patient care unable to redirect at this time will continue to monitor

## 2022-02-08 NOTE — PROGRESS NOTES
GENERAL SURGERY  DAILY PROGRESS NOTE  2/8/2022    No chief complaint on file. Subjective:  Pt intubated and sedated. Objective:  BP (!) 93/55   Pulse 85   Temp 99.8 °F (37.7 °C) (Axillary)   Resp 22   Ht 5' 8\" (1.727 m)   Wt 261 lb 3.9 oz (118.5 kg)   SpO2 95%   BMI 39.72 kg/m²     GENERAL: Intubated and sedated  HEAD: Normocephalic, atraumatic  EYES: No sclera icterus, pupils equal  LUNGS: On mechanical ventilation  CARDIOVASCULAR:  RR and borderline hypotensive  ABDOMEN:  Soft, no grimace to palpation, non-distended.  Gaylan Fogo in place but balloons deflated, minimal output  EXTREMITIES: No edema or swelling  SKIN: Warm and dry    Assessment/Plan:  42 y.o. male with GI hemorrhage s/p esophageal banding x 14 and blakemore tube placement (removed) s/p TIPS 2/7    - Keep pt NPO  - Continue IVFs  - GI recs appreciated, will defer repeat EGD to them if needed  - Monitor H/H Q6  - Monitor ammonia  - Continue octreotide gtt and PPI BID  - SICU care appreciated    Electronically signed by Sherice Ellis MD on 2/8/2022 at 5:51 AM

## 2022-02-08 NOTE — PROGRESS NOTES
[facility]  SURGICAL INTENSIVE CARE UNIT (SICU)  ATTENDING PHYSICIAN CRITICAL CARE PROGRESS NOTE     I have examined the patient, reviewed the record, and discussed the case with the APN/ resident. Please refer to the APN/ resident's note. I agree with the assessment and plan. I have reviewed all relevant labs and imaging data. The following summarizes my clinical findings and independent assessment. CC:  Critical care management for GI bleed    Hospital Course/Overnight Events:  2/6--presented to [facility] with hematemesis  2/7--underwent EGD with esophageal variceal banding--continued bleeding and SB tube placed; transferred here for TIPS; underwent TIPS this AM  2/8--both esophageal and gastric balloons deflated yesterday--no evidence of ongoing hemorrhage    Intubated; sedated; narcotized  Pupils: Pinpoint  Sclera:  Icteric  Conjunctiva: pink-red  Eyes to voice off of sedation  Follows commands off of sedation  Heart: Regular rate/rhythm; no murmur  Lungs: Fairly clear bilaterally  Abdomen: Distended; bowel sounds active  Skin: Warm/dry  Extremities: No edema; distal pulses readily detectable  SP tube in place--gastric and esophageal balloons deflated    Labs/films personally reviewed--no infiltrates on chest x-ray    Patient Active Problem List    Diagnosis Date Noted    GI bleed 02/07/2022    Bleeding esophageal varices ([facility] [address] 02/07/2022    Alcohol abuse 02/07/2022    Acute blood loss anemia 02/07/2022    Pulmonary mass 02/07/2022    Hyperkalemia 02/07/2022    Hematemesis 02/07/2022    Morbid obesity with BMI of 40.0-44.9, adult ([facility] [address] 02/07/2022    Transaminitis 02/07/2022       GI bleed secondary to bleeding esophageal varices--status post EGD with banding but with ongoing bleeding and subsequent SB tube placement--esophageal and gastric balloon now deflated; on octreotide; PPI  Status post TIPS  EtOH abuse--cont phenobarb  Acute respiratory failure--continue mechanical vent support; adjust settings (reduce rate)--did not tolerate spont breathing trial this AM  Shock liver/elevated LFTs--monitor labs; start NAC  Hyperammonemia--start lactulose  Start rifaxamin  Rocephin for SBP prophylaxis  Lactic acidosis--continue IV fluid resuscitation  Electrolyte imbalance (hypocalcemia)--correct as able  Hypoalbuminemia/moderate protein calorie malnutrition--NPO for now  Acute blood loss anemia--monitor H/H  Thrombocytopenia--likely consumptive--cont to monitor  DVT risk--PCDs    Patient is at significant risk for hemodynamic/metabolic/respiratory deterioration requires ongoing ICU care    Tricia Guillen MD, [facility]  2/8/2022  8:24 AM      Critical care time exclusive of teaching and procedures = 40 minutes       NOTE: This report was transcribed using voice recognition software. Every effort was made to ensure accuracy; however, inadvertent computerized transcription errors may be present.

## 2022-02-08 NOTE — PROGRESS NOTES
Chief Complaint:  No chief complaint on file. Hematemesis     Subjective: Intubated, sedated, unresponsive    Objective:    BP (!) 104/53   Pulse 86   Temp 99.8 °F (37.7 °C) (Axillary)   Resp 25   Ht 5' 8\" (1.727 m)   Wt 265 lb 14 oz (120.6 kg)   SpO2 96%   BMI 40.43 kg/m²     Current medications that patient is taking have been reviewed. General appearance: Ill appearing man on vent  HEENT: AT/NC, ETT  Neck: FROM, supple  Lungs: Clear to auscultation anteriorly, WOB normal  CV: RRR, no MRGs  Abdomen: Soft, non-tender; no masses or HSM, +BS  Extremities: No peripheral edema or digital cyanosis  Skin: no rash, lesions or ulcers  Psych: Calm and cooperative  Neuro: intubated/sedated    Labs:  CBC with Differential:    Lab Results   Component Value Date    WBC 9.5 02/08/2022    RBC 2.86 02/08/2022    HGB 8.7 02/08/2022    HCT 26.7 02/08/2022     02/08/2022    MCV 93.4 02/08/2022    MCH 30.4 02/08/2022    MCHC 32.6 02/08/2022    RDW 18.2 02/08/2022    LYMPHOPCT 3.2 02/07/2022    MONOPCT 8.0 02/07/2022    BASOPCT 0.1 02/07/2022    MONOSABS 0.73 02/07/2022    LYMPHSABS 0.29 02/07/2022    EOSABS 0.00 02/07/2022    BASOSABS 0.01 02/07/2022     CMP:    Lab Results   Component Value Date     02/08/2022    K 4.6 02/08/2022     02/08/2022    CO2 25 02/08/2022    BUN 22 02/08/2022    CREATININE 0.9 02/08/2022    GFRAA >60 02/08/2022    LABGLOM >60 02/08/2022    GLUCOSE 123 02/08/2022    PROT 5.9 02/08/2022    LABALBU 2.0 02/08/2022    CALCIUM 7.6 02/08/2022    BILITOT 3.0 02/08/2022    ALKPHOS 131 02/08/2022    AST 2,104 02/08/2022     02/08/2022          Assessment/Plan:  Principal Problem:    Hematemesis  Active Problems:    Bleeding esophageal varices (HCC)    Alcohol abuse    Acute blood loss anemia    Pulmonary mass    Hyperkalemia    Morbid obesity with BMI of 40.0-44.9, adult (HCC)    Transaminitis    Shock liver  Resolved Problems:    * No resolved hospital problems.  * Severe acute liver injury. INR for some reason skipped this morning? Re-ordered for 1 PM.  NAC protocol underway. Likely ischemic liver injury superimposed on pre-existing cirrhosis.     Hemoglobin stabilized    Hyperkalemia resolved    Phenobarb for etOH withdrawal.  Increase thiamine dosage    IV PPI    Ceftriaxone for SBP ppx    Ocreotide end point per GI / SICU    DVT prophylaxis: SCD  Requires continued inpatient level of care     Harish Zamorano MD    12:37 PM  2/8/2022

## 2022-02-08 NOTE — PROGRESS NOTES
Patient continues to pull at ETT, [place] More, A-line, both IVs, and other life saving lines/ tubes. After multiple attempts at reorientation, there was no evidence of learning. Bilateral wrist restraints applied for patient safety. There is  No evidence of injury noted at this time. Will continue to monitor.

## 2022-02-08 NOTE — PROCEDURES
Sadie Rosenthal is a 42 y.o. male patient. 1. Bleeding esophageal varices, unspecified esophageal varices type ([address]      No past medical history on file. Blood pressure (!) 104/53, pulse 86, temperature 99.8 °F (37.7 °C), temperature source Axillary, resp. rate 25, height 5' 8\" (1.727 m), weight 265 lb 14 oz (120.6 kg), SpO2 96 %. Central Line    Date/Time: 2/8/2022 12:47 PM  Performed by: Shanique Bragg MD  Authorized by: Sissy Guillen MD   Consent: Written consent obtained.   Consent given by: power of   Procedure consent: procedure consent matches procedure scheduled  Relevant documents: relevant documents present and verified  Test results: test results available and properly labeled  Site marked: the operative site was marked  Imaging studies: imaging studies available  Required items: required blood products, implants, devices, and special equipment available  Patient identity confirmed: anonymous protocol, patient vented/unresponsive  Indications: vascular access  Anesthesia: see MAR for details    Sedation:  Patient sedated: yes  Sedatives: fentanyl    Preparation: skin prepped with 2% chlorhexidine  Skin prep agent dried: skin prep agent completely dried prior to procedure  Sterile barriers: all five maximum sterile barriers used - cap, mask, sterile gown, sterile gloves, and large sterile sheet  Hand hygiene: hand hygiene performed prior to central venous catheter insertion  Location details: left internal jugular  Patient position: Trendelenburg  Catheter type: triple lumen  Catheter size: 14 Fr  Pre-procedure: landmarks identified  Ultrasound guidance: yes  Sterile ultrasound techniques: sterile gel and sterile probe covers were used  Number of attempts: 1  Successful placement: yes  Post-procedure: line sutured and dressing applied  Assessment: blood return through all ports and free fluid flow  Patient tolerance: patient tolerated the procedure well with no immediate complications      Dr. Jazmin Staples was readily available throughout the entire procedure.     Be Yanez MD  2/8/2022

## 2022-02-08 NOTE — CONSULTS
Hepatology Progress Note      SUBJECTIVE:      Patient remains intubated and sedated. No further evidence of active bleeding such as hematemesis or melena. Nasal Martinez Solar remains in place with esophageal and gastric balloons deflated. OBJECTIVE      Physical    VITALS:  BP (!) 103/49   Pulse 80   Temp 98.7 °F (37.1 °C) (Axillary)   Resp 18   Ht 5' 8\" (1.727 m)   Wt 265 lb 14 oz (120.6 kg)   SpO2 96%   BMI 40.43 kg/m²   Physical Exam:  General: Intubated, sedated  HEENT: ET tube and nasal Blakemore in place  Cards: RRR, LE edema  Resp: Synchronous w/ vent, FiO2 50%, PEEP 8  Abdomen: soft, NT. Distended but improved from previous. Extremities: No effusions or notable bruising. Skin: No rashes. Jaundice. Neuro: Sedated. ASSESSMENT AND PLAN:  46y/M w/ history of EtOH abuse presents w/ acute esophageal variceal bleed s/p EGD w/ banding which was unable to stop the active bleeding now s/p TIPS placement.     DF: 22.7  MELD: 19     PLAN:  1. Esophageal Variceal Hemorrhage:  -S/p EGD w/ banding unsuccessful at stopping the bleed. -S/p definitive treatment w/ TIPS. -Carefully monitor vitals for signs of hemodynamic instability.  -Trend Hgb daily  -Transfuse to Hgb >7. Keep INR<1.5, Plts>50, and Fibrinogen >220.  -With increase in INR, I will order 3 runs of 10mg IV Vit K  -PPI gtt and Octretide gtt should continue for 72hours from Monday morning.   -5 day course of Ceftriaxone  -Michelle Youssef currently has both esophageal and gastric balloons down. If evidence of rebleeding, insufflate gastric balloon only and place to traction.   -If no signs of rebleeding, repeat EGD will not be necessary this admission.   -Martinez Solar remains in place for gastric access. Agree w/ keeping in place for now. Would not exchange for normal caliber NG until more time passes for healing of banded/partially banded varices. These should deflate readily in the presence of a TIPS.   -I have ordered a Duplex TIPS u/s to ensure patency.      2. Acute Alcoholic Hepatitis vs EtOH Cirrhosis:  -Monitor CBC, CMP, and INR daily  -With rapid elevation in transaminases, most likely secondary to shock liver and poor perfusion state though hepatic decompensation also possible.   -Will order labs for underlying causes of liver disease.   -Please use Albumin 25g q 8hours as maintenance fluids.   -Please only use IVFs if needs for stabilization of sudden changes in hemodynamics.  -No role for steroids. 3. Hepatic Encephalopathy:  Patient is very high risk w/ massive GI bleed and placement of TIPS. -While intubated, would favor placement of FCS and beginning lactulose titrated to 1.5L of stool output daily. Can also be titrated to 3-4 BMs daily, but this can be difficult to monitor in ICU setting.   -Lactulose has been started and patient has not had a bowel movement. Would increase lactulose to q 2 hours until the patient passes at least 5-7 stools and then can decrease frequency. -Expect the first bowel movements to contain melena which would be from old blood passing and should not be interpreted as ongoing bleeding.   -Hgb trend and vitals should be used as indicators for persistent GI bleed. -Begin Rifaximin 550mg BID     4. Ascites:  -In the setting of portal hypertension and necessary aggressive blood product and IVF administration for stabilization.  -He will require IR-guided paracentesis at some point. This may be required sooner rather than later if ascites affects ventilation and lung volumes. -Would favor on holding until out of ICU if possible.   -When performed, please send for Cell Count w/ Diff, Albumin, and Culture. -After 48 hours of stabilization following paracentesis (after downgrade from ICU), consider starting low-dose Lasix and Aldactone and slowly uptitrating as BP and Cr allow to 40mg Lasix and 100mg Aldactone.     I will follow closely.     Thank you for including us in the care of this patient.  Please do not hesitate to contact us with any additional questions or concerns.     Tristian Purcell MD  Gastroenterology/Hepatology  [facility]

## 2022-02-08 NOTE — PROGRESS NOTES
OCCUPATIONAL THERAPY    Date:2022  Patient Name: Hien Christianson  MRN: [id number]  : 1975  Room: [facility]-A              Chart reviewed. Pt on hold this am per  Dr Gillian Morgan. Will re-attempt at later time. Thank you for consult.     Jesus Gates, OTR/L [id number]

## 2022-02-08 NOTE — PLAN OF CARE
Problem: Non-Violent Restraints  Goal: Removal from restraints as soon as assessed to be safe  Outcome: Met This Shift  Goal: No harm/injury to patient while restraints in use  Outcome: Met This Shift  Goal: Patient's dignity will be maintained  Outcome: Met This Shift     Problem: Aspiration:  Goal: Absence of aspiration  Description: Absence of aspiration  Outcome: Met This Shift     Problem: Skin Integrity - Impaired:  Goal: Will show no infection signs and symptoms  Description: Will show no infection signs and symptoms  Outcome: Met This Shift  Goal: Absence of new skin breakdown  Description: Absence of new skin breakdown  Outcome: Met This Shift     Problem: Tissue Perfusion - Cardiopulmonary, Altered:  Goal: Hemodynamic stability will improve  Description: Hemodynamic stability will improve  Outcome: Met This Shift

## 2022-02-08 NOTE — FLOWSHEET NOTE
Patient continues to reach for ett and blakemore tube unable to redirect at this time will continue to monitor

## 2022-02-08 NOTE — PROGRESS NOTES
Physical Therapy  Physical Therapy Attempt    Name: Lynnie Gilford  : 1975  MRN: [id number]      Date of Service: 2022  Chart reviewed. Pt on hold at this time per Dr. Melonie Neville. Will re-attempt as able.     Caridad Villarreal, PT, DPT  [id number]

## 2022-02-08 NOTE — PROGRESS NOTES
Surgical Intensive Care Unit   Daily Progress Note     Patient's name:  Roddy Hayes  Age/Gender: 42 y.o. male  Date of Admission: 2/7/2022  6:01 AM  Length of Stay: 1    Reason for ICU: hemodynamic instability      Hospital Course:   2/6 Presented to [facility] with abdominal pain, nausea and hematemesis. Received 2 units of pRBCs. 2/7: Underwent EGD with 14 bands of esophageal varices. Still bleeding so Blakemore tube inserted. Plan for transfer to [address] [place] [address] for TIPS procedure and advanced GI intervention. Overnight Events:   Gastric bubble deflated on Blakemore tube. Patient agitated. Patient started on Lactulose. Problem List:   Patient Active Problem List   Diagnosis    GI bleed    Bleeding esophageal varices (HCC)    Alcohol abuse    Acute blood loss anemia    Pulmonary mass    Hyperkalemia    Hematemesis    Morbid obesity with BMI of 40.0-44.9, adult (HCC)    Transaminitis       Surgical/Interventional Procedures:       Vent Settings: Additional Respiratory  Assessments  Pulse: 85  Resp: 22  SpO2: 95 %  Humidification Source: Heated wire  Humidification Temp: 37  Circuit Condensation: Drained  Oral Care: Mouth swabbed,Mouth moisturizer,Mouth suctioned,Suction toothette,Mouthwash with chlorhexidine,Lip moisturizer applied  Airway Type: ET  Airway Size: 7.5  ABG:   Recent Labs     02/08/22  0451   PH 7.578*   PCO2 31.3*   PO2 80.1   HCO3 28.6*   BE 6.6*   O2SAT 95.5       I/O:  I/O last 3 completed shifts:   In: 1409 [I.V.:1187; IV Piggyback:222]  Out: 1965 [BXMFO:1705]  I/O this shift:  In: 5323 [I.V.:1717; IV Piggyback:39]  Out: 460 [Urine:460]  Urethral Catheter 16 fr-Output (mL): 40 mL  NG/OG/NJ/NE Tube Nasogastric Left nostril-Output (mL): 0 ml       Lines:   R femoral introducer (2/7)  R radial arterial line (2/7)    Tubes:   Blakemore tube 92/7)  ET (2/7)    Drains:   Escudero (2/7)    Drips:   propofol 40 mcg/kg/min (02/08/22 0300)    fentaNYL 5 mcg/ml in 0.9%  ml infusion 125 mcg/hr (22 0131)    octreotide (SANDOSTATIN) infusion 25 mcg/hr (22 7179)    sodium chloride      dextrose      sodium bicarbonate infusion 100 mL/hr at 22 7550       Physical Exam:   BP (!) 93/55   Pulse 85   Temp 99.8 °F (37.7 °C) (Axillary)   Resp 22   Ht 5' 8\" (1.727 m)   Wt 261 lb 3.9 oz (118.5 kg)   SpO2 95%   BMI 39.72 kg/m²     Average, Min, and Max for last 24 hours Vitals:  Temp:  Temp  Av.2 °F (36.8 °C)  Min: 93.6 °F (34.2 °C)  Max: 99.9 °F (37.7 °C)  RR: Resp  Av  Min: 0  Max: 22  HR: Pulse  Av.7  Min: 83  Max: 640  BP:  Systolic (48HWX), CLA:702 , Min:93 , MBD:777   ; Diastolic (75UNE), SEP:17, Min:52, Max:89    SpO2: SpO2  Av.9 %  Min: 93 %  Max: 100 %        GCS:    2 - Opens eyes with pain   4 - Moves part of body but does not remove noxious stimulus  1 - Makes no noise    Pupil size:  Left 2 mm    Right 2 mm    Pupil reaction: Yes    Wiggles fingers: Left No Right No    Hand grasp:   Left none       Right none    Wiggles toes: Left No    Right No    Plantar flexion: Left none     Right none      CONSTITUTIONAL: no acute distress, intubated and sedated  NEUROLOGIC: PERRL  CARDIOVASCULAR: S1 S2, regular rate, regular rhythm, no murmur/gallop/rub  PULMONARY: no rhonchi/rales/wheezes. AC/VC PEEP 8 FiO2 50%  RENAL: yeung to gravity, clear yellow urine  ABDOMEN: soft, nontender, moderately distended, nontympanic, no masses, no organomegaly  SKIN/EXTREMITIES: no rashes/ecchymosis, no edema/clubbing, warm/dry, good capillary refill       ASSESSMENT / PLAN:   Neuro:             - Pain/Sedation:  Fentanyl and propofol  - Alcohol abuse: Phenobarbitol 250 q6 for 4 doses then 130 q 6.  - Hyperammonemia: Lactulose, Rifaximin.      CV:  - Hemorrhagic shock- resolved. Continue to monitor hemodynamics     Pulm:   - Acute respiratory failure: On ventilator AC/VC PEEP 8 FiO2 50%     Renal:  - hyperkalemia: resolved.  Continue to monitor daily CMP. - hypocalcemia: replace as needed  - hypomagnesemia: replace as needed  - Lactic acidosis: continue to monitor and resuscitate with IV fluids. Stop Bicarb drip     GI:                    - Diet:  NPO  - variceal hemorrhage: s/p IR TIPS procedure 2/7. Blakemore tube in place, GI following, Monitor HgB, Octreotide gtt, PPI  - elevated transaminases: NAC  - SBP: Rocephin 5-7 days     Heme:  - acute blood loss anemia: 4:1:4. Monitor HgB and daily CBC     ID:  - No acute issues     Endo:   - No acute issues. Keep glucose < 180.     MSK:  - No acute issues.  WBAT to all extremities     Bowel reg:                none  DVT ppx:                   SCDs  GI ppx:                      PPI, octreotide  Seizure proph:         none  Mouth/eye care:       Lacrilube, Peridex  Escudero:                        For critical care monitoring of fluid balance  Central lines:            Introducer, arterial line  Family Update:         As available   CODE Status:           FULL      Dispo:                       SICU      Electronically signed by Marielle Aguirre MD on 2/8/2022 at 6:07 AM

## 2022-02-09 ENCOUNTER — APPOINTMENT (OUTPATIENT)
Dept: CT IMAGING | Age: 47
DRG: 405 | End: 2022-02-09
Attending: INTERNAL MEDICINE
Payer: COMMERCIAL

## 2022-02-09 ENCOUNTER — APPOINTMENT (OUTPATIENT)
Dept: ULTRASOUND IMAGING | Age: 47
DRG: 405 | End: 2022-02-09
Attending: INTERNAL MEDICINE
Payer: COMMERCIAL

## 2022-02-09 ENCOUNTER — APPOINTMENT (OUTPATIENT)
Dept: GENERAL RADIOLOGY | Age: 47
DRG: 405 | End: 2022-02-09
Attending: INTERNAL MEDICINE
Payer: COMMERCIAL

## 2022-02-09 LAB
AADO2: 196.8 MMHG
AADO2: 197.7 MMHG
ALBUMIN SERPL-MCNC: 2.8 G/DL (ref 3.5–5.2)
ALBUMIN SERPL-MCNC: 3.1 G/DL (ref 3.5–5.2)
ALBUMIN SERPL-MCNC: 3.3 G/DL (ref 3.5–5.2)
ALP BLD-CCNC: 148 U/L (ref 40–129)
ALP BLD-CCNC: 152 U/L (ref 40–129)
ALP BLD-CCNC: 164 U/L (ref 40–129)
ALT SERPL-CCNC: 510 U/L (ref 0–40)
ALT SERPL-CCNC: 524 U/L (ref 0–40)
ALT SERPL-CCNC: 524 U/L (ref 0–40)
AMMONIA: 137 UMOL/L (ref 16–60)
AMMONIA: 145 UMOL/L (ref 16–60)
ANION GAP SERPL CALCULATED.3IONS-SCNC: 10 MMOL/L (ref 7–16)
ANION GAP SERPL CALCULATED.3IONS-SCNC: 8 MMOL/L (ref 7–16)
ANION GAP SERPL CALCULATED.3IONS-SCNC: 9 MMOL/L (ref 7–16)
AST SERPL-CCNC: 1572 U/L (ref 0–39)
AST SERPL-CCNC: 1653 U/L (ref 0–39)
AST SERPL-CCNC: 1847 U/L (ref 0–39)
B.E.: 8.4 MMOL/L (ref -3–3)
B.E.: 9.9 MMOL/L (ref -3–3)
BILIRUB SERPL-MCNC: 3.1 MG/DL (ref 0–1.2)
BILIRUB SERPL-MCNC: 4.5 MG/DL (ref 0–1.2)
BILIRUB SERPL-MCNC: 5.8 MG/DL (ref 0–1.2)
BUN BLDV-MCNC: 18 MG/DL (ref 6–20)
BUN BLDV-MCNC: 20 MG/DL (ref 6–20)
BUN BLDV-MCNC: 22 MG/DL (ref 6–20)
CALCIUM IONIZED: 1.12 MMOL/L (ref 1.15–1.33)
CALCIUM SERPL-MCNC: 7.5 MG/DL (ref 8.6–10.2)
CALCIUM SERPL-MCNC: 7.9 MG/DL (ref 8.6–10.2)
CALCIUM SERPL-MCNC: 8.1 MG/DL (ref 8.6–10.2)
CHLORIDE BLD-SCNC: 102 MMOL/L (ref 98–107)
CHLORIDE BLD-SCNC: 106 MMOL/L (ref 98–107)
CHLORIDE BLD-SCNC: 107 MMOL/L (ref 98–107)
CO2: 27 MMOL/L (ref 22–29)
CO2: 30 MMOL/L (ref 22–29)
CO2: 31 MMOL/L (ref 22–29)
COHB: 0.1 % (ref 0–1.5)
COHB: 0.2 % (ref 0–1.5)
CREAT SERPL-MCNC: 0.7 MG/DL (ref 0.7–1.2)
CRITICAL: ABNORMAL
CRITICAL: ABNORMAL
DATE ANALYZED: ABNORMAL
DATE ANALYZED: ABNORMAL
DATE OF COLLECTION: ABNORMAL
DATE OF COLLECTION: ABNORMAL
FERRITIN: 332 NG/ML
FIO2: 50 %
FIO2: 50 %
GFR AFRICAN AMERICAN: >60
GFR NON-AFRICAN AMERICAN: >60 ML/MIN/1.73
GLUCOSE BLD-MCNC: 124 MG/DL (ref 74–99)
GLUCOSE BLD-MCNC: 128 MG/DL (ref 74–99)
GLUCOSE BLD-MCNC: 141 MG/DL (ref 74–99)
HAV IGM SER IA-ACNC: NORMAL
HCO3: 31.3 MMOL/L (ref 22–26)
HCO3: 33.1 MMOL/L (ref 22–26)
HCT VFR BLD CALC: 25 % (ref 37–54)
HCT VFR BLD CALC: 25.3 % (ref 37–54)
HCT VFR BLD CALC: 26.6 % (ref 37–54)
HEMOGLOBIN: 8.2 G/DL (ref 12.5–16.5)
HEMOGLOBIN: 8.2 G/DL (ref 12.5–16.5)
HEMOGLOBIN: 8.7 G/DL (ref 12.5–16.5)
HEPATITIS B CORE IGM ANTIBODY: NORMAL
HEPATITIS B SURFACE ANTIGEN INTERPRETATION: NORMAL
HEPATITIS C ANTIBODY INTERPRETATION: NORMAL
HHB: 2.7 % (ref 0–5)
HHB: 2.7 % (ref 0–5)
INR BLD: 2.1
IRON SATURATION: 26 % (ref 20–55)
IRON: 50 MCG/DL (ref 59–158)
LAB: ABNORMAL
LAB: ABNORMAL
LACTIC ACID: 2 MMOL/L (ref 0.5–2.2)
Lab: ABNORMAL
Lab: ABNORMAL
MAGNESIUM: 2.2 MG/DL (ref 1.6–2.6)
MAGNESIUM: 2.4 MG/DL (ref 1.6–2.6)
MCH RBC QN AUTO: 30.4 PG (ref 26–35)
MCH RBC QN AUTO: 30.6 PG (ref 26–35)
MCH RBC QN AUTO: 31 PG (ref 26–35)
MCHC RBC AUTO-ENTMCNC: 32.4 % (ref 32–34.5)
MCHC RBC AUTO-ENTMCNC: 32.7 % (ref 32–34.5)
MCHC RBC AUTO-ENTMCNC: 32.8 % (ref 32–34.5)
MCV RBC AUTO: 93.3 FL (ref 80–99.9)
MCV RBC AUTO: 93.7 FL (ref 80–99.9)
MCV RBC AUTO: 94.7 FL (ref 80–99.9)
METHB: 0.1 % (ref 0–1.5)
METHB: 0.4 % (ref 0–1.5)
MODE: AC
MODE: AC
O2 SATURATION: 97.3 % (ref 92–98.5)
O2 SATURATION: 97.3 % (ref 92–98.5)
O2HB: 96.7 % (ref 94–97)
O2HB: 97.1 % (ref 94–97)
OPERATOR ID: 405
OPERATOR ID: ABNORMAL
PATIENT TEMP: 37 C
PATIENT TEMP: 37 C
PCO2: 36.8 MMHG (ref 35–45)
PCO2: 39.2 MMHG (ref 35–45)
PDW BLD-RTO: 18.3 FL (ref 11.5–15)
PDW BLD-RTO: 18.5 FL (ref 11.5–15)
PDW BLD-RTO: 18.5 FL (ref 11.5–15)
PEEP/CPAP: 8 CMH2O
PEEP/CPAP: 8 CMH2O
PFO2: 2.04 MMHG/%
PFO2: 2.12 MMHG/%
PH BLOOD GAS: 7.54 (ref 7.35–7.45)
PH BLOOD GAS: 7.55 (ref 7.35–7.45)
PHENOBARBITAL LEVEL: 17.4 MCG/ML (ref 15–40)
PHOSPHORUS: 2.7 MG/DL (ref 2.5–4.5)
PLATELET # BLD: 111 E9/L (ref 130–450)
PLATELET # BLD: 112 E9/L (ref 130–450)
PLATELET # BLD: 116 E9/L (ref 130–450)
PMV BLD AUTO: 11.6 FL (ref 7–12)
PMV BLD AUTO: 11.7 FL (ref 7–12)
PMV BLD AUTO: 11.8 FL (ref 7–12)
PO2: 102.2 MMHG (ref 75–100)
PO2: 105.8 MMHG (ref 75–100)
POTASSIUM REFLEX MAGNESIUM: 3.5 MMOL/L (ref 3.5–5)
POTASSIUM REFLEX MAGNESIUM: 4 MMOL/L (ref 3.5–5)
POTASSIUM REFLEX MAGNESIUM: 4.1 MMOL/L (ref 3.5–5)
PROTHROMBIN TIME: 23.2 SEC (ref 9.3–12.4)
RBC # BLD: 2.68 E12/L (ref 3.8–5.8)
RBC # BLD: 2.7 E12/L (ref 3.8–5.8)
RBC # BLD: 2.81 E12/L (ref 3.8–5.8)
RI(T): 1.86
RI(T): 1.93
RR MECHANICAL: 18 B/MIN
RR MECHANICAL: 18 B/MIN
SMOOTH MUSCLE ANTIBODY: NEGATIVE
SODIUM BLD-SCNC: 142 MMOL/L (ref 132–146)
SODIUM BLD-SCNC: 144 MMOL/L (ref 132–146)
SODIUM BLD-SCNC: 144 MMOL/L (ref 132–146)
SOURCE, BLOOD GAS: ABNORMAL
SOURCE, BLOOD GAS: ABNORMAL
THB: 9.2 G/DL (ref 11.5–16.5)
THB: 9.7 G/DL (ref 11.5–16.5)
TIME ANALYZED: 502
TIME ANALYZED: 854
TOTAL IRON BINDING CAPACITY: 189 MCG/DL (ref 250–450)
TOTAL PROTEIN: 6.2 G/DL (ref 6.4–8.3)
TOTAL PROTEIN: 6.7 G/DL (ref 6.4–8.3)
TOTAL PROTEIN: 7.2 G/DL (ref 6.4–8.3)
VT MECHANICAL: 500 ML
VT MECHANICAL: 500 ML
WBC # BLD: 7.7 E9/L (ref 4.5–11.5)
WBC # BLD: 7.7 E9/L (ref 4.5–11.5)
WBC # BLD: 8.5 E9/L (ref 4.5–11.5)

## 2022-02-09 PROCEDURE — 6360000002 HC RX W HCPCS: Performed by: STUDENT IN AN ORGANIZED HEALTH CARE EDUCATION/TRAINING PROGRAM

## 2022-02-09 PROCEDURE — 2580000003 HC RX 258: Performed by: STUDENT IN AN ORGANIZED HEALTH CARE EDUCATION/TRAINING PROGRAM

## 2022-02-09 PROCEDURE — 82805 BLOOD GASES W/O2 SATURATION: CPT

## 2022-02-09 PROCEDURE — 93976 VASCULAR STUDY: CPT

## 2022-02-09 PROCEDURE — 99291 CRITICAL CARE FIRST HOUR: CPT | Performed by: SURGERY

## 2022-02-09 PROCEDURE — 36415 COLL VENOUS BLD VENIPUNCTURE: CPT

## 2022-02-09 PROCEDURE — 70450 CT HEAD/BRAIN W/O DYE: CPT

## 2022-02-09 PROCEDURE — 83605 ASSAY OF LACTIC ACID: CPT

## 2022-02-09 PROCEDURE — 87040 BLOOD CULTURE FOR BACTERIA: CPT

## 2022-02-09 PROCEDURE — P9047 ALBUMIN (HUMAN), 25%, 50ML: HCPCS | Performed by: SURGERY

## 2022-02-09 PROCEDURE — 6370000000 HC RX 637 (ALT 250 FOR IP): Performed by: STUDENT IN AN ORGANIZED HEALTH CARE EDUCATION/TRAINING PROGRAM

## 2022-02-09 PROCEDURE — 83550 IRON BINDING TEST: CPT

## 2022-02-09 PROCEDURE — 71045 X-RAY EXAM CHEST 1 VIEW: CPT

## 2022-02-09 PROCEDURE — 6370000000 HC RX 637 (ALT 250 FOR IP): Performed by: SURGERY

## 2022-02-09 PROCEDURE — 82140 ASSAY OF AMMONIA: CPT

## 2022-02-09 PROCEDURE — 82103 ALPHA-1-ANTITRYPSIN TOTAL: CPT

## 2022-02-09 PROCEDURE — 86255 FLUORESCENT ANTIBODY SCREEN: CPT

## 2022-02-09 PROCEDURE — 6360000002 HC RX W HCPCS: Performed by: SURGERY

## 2022-02-09 PROCEDURE — 2580000003 HC RX 258: Performed by: PHYSICIAN ASSISTANT

## 2022-02-09 PROCEDURE — 83540 ASSAY OF IRON: CPT

## 2022-02-09 PROCEDURE — 76705 ECHO EXAM OF ABDOMEN: CPT

## 2022-02-09 PROCEDURE — C9113 INJ PANTOPRAZOLE SODIUM, VIA: HCPCS | Performed by: INTERNAL MEDICINE

## 2022-02-09 PROCEDURE — 2580000003 HC RX 258: Performed by: INTERNAL MEDICINE

## 2022-02-09 PROCEDURE — 6360000002 HC RX W HCPCS: Performed by: INTERNAL MEDICINE

## 2022-02-09 PROCEDURE — 80184 ASSAY OF PHENOBARBITAL: CPT

## 2022-02-09 PROCEDURE — 84100 ASSAY OF PHOSPHORUS: CPT

## 2022-02-09 PROCEDURE — 85027 COMPLETE CBC AUTOMATED: CPT

## 2022-02-09 PROCEDURE — 37799 UNLISTED PX VASCULAR SURGERY: CPT

## 2022-02-09 PROCEDURE — 80074 ACUTE HEPATITIS PANEL: CPT

## 2022-02-09 PROCEDURE — 82390 ASSAY OF CERULOPLASMIN: CPT

## 2022-02-09 PROCEDURE — 83735 ASSAY OF MAGNESIUM: CPT

## 2022-02-09 PROCEDURE — 94003 VENT MGMT INPAT SUBQ DAY: CPT

## 2022-02-09 PROCEDURE — 51702 INSERT TEMP BLADDER CATH: CPT

## 2022-02-09 PROCEDURE — 2580000003 HC RX 258: Performed by: SURGERY

## 2022-02-09 PROCEDURE — 82728 ASSAY OF FERRITIN: CPT

## 2022-02-09 PROCEDURE — 85610 PROTHROMBIN TIME: CPT

## 2022-02-09 PROCEDURE — 82330 ASSAY OF CALCIUM: CPT

## 2022-02-09 PROCEDURE — 2000000000 HC ICU R&B

## 2022-02-09 PROCEDURE — 80053 COMPREHEN METABOLIC PANEL: CPT

## 2022-02-09 PROCEDURE — 6360000002 HC RX W HCPCS

## 2022-02-09 PROCEDURE — 2500000003 HC RX 250 WO HCPCS: Performed by: SURGERY

## 2022-02-09 RX ORDER — FENTANYL CITRATE 50 UG/ML
50 INJECTION, SOLUTION INTRAMUSCULAR; INTRAVENOUS ONCE
Status: COMPLETED | OUTPATIENT
Start: 2022-02-09 | End: 2022-02-09

## 2022-02-09 RX ORDER — MULTIVITAMIN WITH IRON
1 TABLET ORAL DAILY
Status: DISCONTINUED | OUTPATIENT
Start: 2022-02-10 | End: 2022-02-10

## 2022-02-09 RX ORDER — SODIUM CHLORIDE 9 MG/ML
INJECTION, SOLUTION INTRAVENOUS PRN
Status: CANCELLED | OUTPATIENT
Start: 2022-02-09

## 2022-02-09 RX ORDER — POTASSIUM CHLORIDE 29.8 MG/ML
20 INJECTION INTRAVENOUS
Status: COMPLETED | OUTPATIENT
Start: 2022-02-09 | End: 2022-02-09

## 2022-02-09 RX ORDER — FENTANYL CITRATE 50 UG/ML
INJECTION, SOLUTION INTRAMUSCULAR; INTRAVENOUS
Status: COMPLETED
Start: 2022-02-09 | End: 2022-02-09

## 2022-02-09 RX ORDER — FENTANYL CITRATE 50 UG/ML
25 INJECTION, SOLUTION INTRAMUSCULAR; INTRAVENOUS
Status: DISCONTINUED | OUTPATIENT
Start: 2022-02-09 | End: 2022-02-14

## 2022-02-09 RX ORDER — ONDANSETRON 2 MG/ML
4 INJECTION INTRAMUSCULAR; INTRAVENOUS EVERY 6 HOURS
Status: DISCONTINUED | OUTPATIENT
Start: 2022-02-09 | End: 2022-02-10

## 2022-02-09 RX ORDER — LACTULOSE 10 G/15ML
20 SOLUTION ORAL EVERY 6 HOURS SCHEDULED
Status: DISCONTINUED | OUTPATIENT
Start: 2022-02-09 | End: 2022-02-10

## 2022-02-09 RX ADMIN — ONDANSETRON 4 MG: 2 INJECTION INTRAMUSCULAR; INTRAVENOUS at 20:35

## 2022-02-09 RX ADMIN — RIFAXIMIN 550 MG: 550 TABLET ORAL at 09:32

## 2022-02-09 RX ADMIN — POTASSIUM PHOSPHATE, MONOBASIC POTASSIUM PHOSPHATE, DIBASIC 15 MMOL: 224; 236 INJECTION, SOLUTION, CONCENTRATE INTRAVENOUS at 10:45

## 2022-02-09 RX ADMIN — LACTULOSE 20 G: 20 SOLUTION ORAL at 23:43

## 2022-02-09 RX ADMIN — LACTULOSE 20 G: 10 SOLUTION ORAL at 06:00

## 2022-02-09 RX ADMIN — MINERAL OIL AND WHITE PETROLATUM: 150; 830 OINTMENT OPHTHALMIC at 06:42

## 2022-02-09 RX ADMIN — MINERAL OIL AND WHITE PETROLATUM: 150; 830 OINTMENT OPHTHALMIC at 14:27

## 2022-02-09 RX ADMIN — LACTULOSE 20 G: 10 SOLUTION ORAL at 00:00

## 2022-02-09 RX ADMIN — POTASSIUM CHLORIDE 20 MEQ: 400 INJECTION, SOLUTION INTRAVENOUS at 10:12

## 2022-02-09 RX ADMIN — FENTANYL CITRATE 50 MCG: 50 INJECTION, SOLUTION INTRAMUSCULAR; INTRAVENOUS at 20:30

## 2022-02-09 RX ADMIN — ONDANSETRON 4 MG: 2 INJECTION INTRAMUSCULAR; INTRAVENOUS at 15:40

## 2022-02-09 RX ADMIN — POTASSIUM CHLORIDE 20 MEQ: 400 INJECTION, SOLUTION INTRAVENOUS at 11:45

## 2022-02-09 RX ADMIN — LACTULOSE 20 G: 10 SOLUTION ORAL at 11:53

## 2022-02-09 RX ADMIN — ALBUMIN (HUMAN) 25 G: 0.25 INJECTION, SOLUTION INTRAVENOUS at 01:14

## 2022-02-09 RX ADMIN — FOLIC ACID 1 MG: 5 INJECTION, SOLUTION INTRAMUSCULAR; INTRAVENOUS; SUBCUTANEOUS at 09:29

## 2022-02-09 RX ADMIN — LACTULOSE 20 G: 20 SOLUTION ORAL at 18:00

## 2022-02-09 RX ADMIN — POLYVINYL ALCOHOL 1 DROP: 14 SOLUTION/ DROPS OPHTHALMIC at 12:54

## 2022-02-09 RX ADMIN — PHENOBARBITAL SODIUM 130 MG: 65 INJECTION INTRAMUSCULAR at 05:14

## 2022-02-09 RX ADMIN — CEFTRIAXONE 2000 MG: 2 INJECTION, POWDER, FOR SOLUTION INTRAMUSCULAR; INTRAVENOUS at 20:35

## 2022-02-09 RX ADMIN — PANTOPRAZOLE SODIUM 40 MG: 40 INJECTION, POWDER, FOR SOLUTION INTRAVENOUS at 09:25

## 2022-02-09 RX ADMIN — PANTOPRAZOLE SODIUM 40 MG: 40 INJECTION, POWDER, FOR SOLUTION INTRAVENOUS at 20:37

## 2022-02-09 RX ADMIN — MINERAL OIL AND WHITE PETROLATUM: 150; 830 OINTMENT OPHTHALMIC at 19:06

## 2022-02-09 RX ADMIN — RIFAXIMIN 550 MG: 550 TABLET ORAL at 20:37

## 2022-02-09 RX ADMIN — OCTREOTIDE ACETATE 25 MCG/HR: 500 INJECTION, SOLUTION INTRAVENOUS; SUBCUTANEOUS at 17:32

## 2022-02-09 RX ADMIN — CALCIUM GLUCONATE 1000 MG: 98 INJECTION, SOLUTION INTRAVENOUS at 09:23

## 2022-02-09 RX ADMIN — MINERAL OIL AND WHITE PETROLATUM: 150; 830 OINTMENT OPHTHALMIC at 21:45

## 2022-02-09 RX ADMIN — PHYTONADIONE 10 MG: 10 INJECTION, EMULSION INTRAMUSCULAR; INTRAVENOUS; SUBCUTANEOUS at 09:23

## 2022-02-09 RX ADMIN — ONDANSETRON 4 MG: 2 INJECTION INTRAMUSCULAR; INTRAVENOUS at 09:26

## 2022-02-09 RX ADMIN — THIAMINE HYDROCHLORIDE 500 MG: 100 INJECTION, SOLUTION INTRAMUSCULAR; INTRAVENOUS at 09:23

## 2022-02-09 RX ADMIN — PROPOFOL 30 MCG/KG/MIN: 10 INJECTION, EMULSION INTRAVENOUS at 01:21

## 2022-02-09 RX ADMIN — POLYVINYL ALCOHOL 1 DROP: 14 SOLUTION/ DROPS OPHTHALMIC at 09:25

## 2022-02-09 RX ADMIN — Medication 10 ML: at 20:37

## 2022-02-09 RX ADMIN — CHLORHEXIDINE GLUCONATE 0.12% ORAL RINSE 15 ML: 1.2 LIQUID ORAL at 09:35

## 2022-02-09 RX ADMIN — LACTULOSE 20 G: 10 SOLUTION ORAL at 02:00

## 2022-02-09 RX ADMIN — ALBUMIN (HUMAN) 25 G: 0.25 INJECTION, SOLUTION INTRAVENOUS at 17:30

## 2022-02-09 RX ADMIN — POLYVINYL ALCOHOL 1 DROP: 14 SOLUTION/ DROPS OPHTHALMIC at 20:36

## 2022-02-09 RX ADMIN — LACTULOSE 20 G: 10 SOLUTION ORAL at 04:31

## 2022-02-09 RX ADMIN — POLYVINYL ALCOHOL 1 DROP: 14 SOLUTION/ DROPS OPHTHALMIC at 04:30

## 2022-02-09 RX ADMIN — POLYVINYL ALCOHOL 1 DROP: 14 SOLUTION/ DROPS OPHTHALMIC at 14:38

## 2022-02-09 RX ADMIN — ALBUMIN (HUMAN) 25 G: 0.25 INJECTION, SOLUTION INTRAVENOUS at 09:27

## 2022-02-09 RX ADMIN — POLYVINYL ALCOHOL 1 DROP: 14 SOLUTION/ DROPS OPHTHALMIC at 00:30

## 2022-02-09 RX ADMIN — LACTULOSE 20 G: 10 SOLUTION ORAL at 09:28

## 2022-02-09 RX ADMIN — MINERAL OIL AND WHITE PETROLATUM: 150; 830 OINTMENT OPHTHALMIC at 02:15

## 2022-02-09 RX ADMIN — MINERAL OIL AND WHITE PETROLATUM: 150; 830 OINTMENT OPHTHALMIC at 09:25

## 2022-02-09 RX ADMIN — Medication 10 ML: at 09:32

## 2022-02-09 RX ADMIN — CHLORHEXIDINE GLUCONATE 0.12% ORAL RINSE 15 ML: 1.2 LIQUID ORAL at 20:35

## 2022-02-09 ASSESSMENT — PULMONARY FUNCTION TESTS
PIF_VALUE: 36
PIF_VALUE: 37
PIF_VALUE: 41
PIF_VALUE: 35
PIF_VALUE: 30
PIF_VALUE: 31
PIF_VALUE: 32
PIF_VALUE: 38
PIF_VALUE: 37
PIF_VALUE: 33
PIF_VALUE: 40
PIF_VALUE: 41
PIF_VALUE: 36
PIF_VALUE: 36
PIF_VALUE: 45
PIF_VALUE: 42
PIF_VALUE: 36
PIF_VALUE: 38
PIF_VALUE: 37
PIF_VALUE: 34
PIF_VALUE: 40
PIF_VALUE: 36
PIF_VALUE: 38
PIF_VALUE: 35
PIF_VALUE: 36
PIF_VALUE: 38
PIF_VALUE: 30
PIF_VALUE: 33
PIF_VALUE: 31
PIF_VALUE: 32

## 2022-02-09 ASSESSMENT — PAIN SCALES - GENERAL
PAINLEVEL_OUTOF10: 0

## 2022-02-09 NOTE — PROGRESS NOTES
[facility]  SURGICAL INTENSIVE CARE UNIT (SICU)  ATTENDING PHYSICIAN CRITICAL CARE PROGRESS NOTE     I have examined the patient, reviewed the record, and discussed the case with the APN/ resident. Please refer to the APN/ resident's note. I agree with the assessment and plan. I have reviewed all relevant labs and imaging data. The following summarizes my clinical findings and independent assessment. CC:  Critical care management for GI bleed    Hospital Course/Overnight Events:  2/6--presented to [facility] with hematemesis  2/7--underwent EGD with esophageal variceal banding--continued bleeding and SB tube placed; transferred here for TIPS; underwent TIPS this AM  2/8--both esophageal and gastric balloons deflated yesterday--no evidence of ongoing hemorrhage  2/9--unresponsive overnight; not following commands; sedation held this AM    Intubated  Pupils: Pinpoint  Sclera:  Icteric  Conjunctiva: pink-red  No eye opening  No response to pain  + cough  Heart: Regular rate/rhythm; no murmur  Lungs: Fairly clear bilaterally  Abdomen: Distended; bowel sounds active  Skin: Warm/dry  Extremities: No edema; distal pulses readily detectable  SP tube in place--gastric and esophageal balloons deflated    Labs personally reviewed    Patient Active Problem List    Diagnosis Date Noted    Shock liver 02/08/2022    GI bleed 02/07/2022    Bleeding esophageal varices ([facility] [address] 02/07/2022    Alcohol abuse 02/07/2022    Acute blood loss anemia 02/07/2022    Pulmonary mass 02/07/2022    Hyperkalemia 02/07/2022    Hematemesis 02/07/2022    Morbid obesity with BMI of 40.0-44.9, adult ([facility] [address] [facility] 02/07/2022    Transaminitis 02/07/2022       GI bleed secondary to bleeding esophageal varices--status post EGD with banding but with ongoing bleeding and subsequent SB tube placement--esophageal and gastric balloon now deflated; on octreotide; PPI  Status post TIPS  EtOH abuse--hold phenobarb due to change in mental status  Acute respiratory failure--continue mechanical vent support; attempt spont breathing trial  Shock liver/elevated LFTs--monitor labs; cont NAC  Hyperammonemia--cont lactulose  Cont rifaxamin  Rocephin for SBP prophylaxis  Altered LOC--check head CT to assess for any cerebral edema  Lactic acidosis--resolved with IV fluid resuscitation  Electrolyte imbalance (hypocalcemia)--correct as able  Hypoalbuminemia/moderate protein calorie malnutrition--NPO for now  Acute blood loss anemia--monitor H/H  Thrombocytopenia--likely consumptive--cont to monitor  DVT risk--PCDs    Patient is at significant risk for hemodynamic/metabolic/respiratory deterioration requires ongoing ICU care    Kelly Redmond MD, FACS  2/9/2022  7:00 AM      Critical care time exclusive of teaching and procedures = 40 minutes       NOTE: This report was transcribed using voice recognition software. Every effort was made to ensure accuracy; however, inadvertent computerized transcription errors may be present.

## 2022-02-09 NOTE — FLOWSHEET NOTE
Notified dr Akilah Escobedo of change in mental status of patient, patient not responding to verbal commands , only responds with oral care , does not follow commands or move any extremities to commands or otherwise.  Patient does have cough and reactive pupils , had been titrating sedation and pain medications though out the shift , both sedation and pain medications on hold for now

## 2022-02-09 NOTE — PROGRESS NOTES
(8)  R radial arterial line ()    Tubes:   Blakemore tube 92/7)  ET ()    Drains:   Yeung ()    Drips:   propofol Stopped (22 0554)    fentaNYL 5 mcg/ml in 0.9%  ml infusion Stopped (22 05)    octreotide (SANDOSTATIN) infusion 25 mcg/hr (22)    sodium chloride      dextrose         Physical Exam:   BP (!) 103/49   Pulse 80   Temp 98.7 °F (37.1 °C) (Axillary)   Resp 18   Ht 5' 8\" (1.727 m)   Wt 265 lb 14 oz (120.6 kg)   SpO2 96%   BMI 40.43 kg/m²     Average, Min, and Max for last 24 hours Vitals:  Temp:  Temp  Av.9 °F (37.7 °C)  Min: 98.7 °F (37.1 °C)  Max: 100.9 °F (38.3 °C)  RR: Resp  Av.7  Min: 15  Max: 25  HR: Pulse  Av.6  Min: 79  Max: 471  BP:  Systolic (95CPH), VNF:923 , Min:98 , AJP:168   ; Diastolic (46ROH), CYB:07, Min:49, Max:53    SpO2: SpO2  Av.4 %  Min: 92 %  Max: 97 %        GCS:    3T    Pupil size:  Left 2 mm    Right 2 mm    Pupil reaction: Yes    Wiggles fingers: Left No Right No    Hand grasp:   Left none       Right none    Wiggles toes: Left No    Right No    Plantar flexion: Left none     Right none      CONSTITUTIONAL: no acute distress, intubated and sedated  NEUROLOGIC: PERRL, gag reflex present  CARDIOVASCULAR: S1 S2, regular rate, regular rhythm, no murmur/gallop/rub  PULMONARY: no rhonchi/rales/wheezes. AC/VC PEEP 8 FiO2 50%  RENAL: yeung to gravity, clear yellow urine  ABDOMEN: soft, nontender, moderately distended, nontympanic, no masses, no organomegaly  SKIN/EXTREMITIES: no rashes/ecchymosis, no edema/clubbing, warm/dry, good capillary refill       ASSESSMENT / PLAN:   Neuro:             - Pain/Sedation:  DC fentanyl/ propofol  - Alcohol abuse: Phenobarbitol 130 q 6.  - Hyperammonemia: Lactulose, Rifaximin.   - New-onset lethargy: STAT head CT     CV:  - Hemorrhagic shock- resolved. Continue to monitor hemodynamics     Pulm:   - Acute respiratory failure:  On ventilator AC/VC PEEP 8 FiO2 50%     Renal:  - hyperkalemia: resolved. Continue to monitor daily CMP. - hypocalcemia: replace as needed  - hypomagnesemia: replace as needed  - Lactic acidosis: resolved. Continue to monitor     GI:                    - Diet:  NPO  - variceal hemorrhage: s/p IR TIPS procedure 2/7. Blakemore tube in place, GI following, Monitor HgB, Octreotide gtt, PPI  - elevated transaminases: NAC  - SBP: Rocephin 5-7 days  - ascites: IR paracentesis at some point     Heme:  - acute blood loss anemia: 4:1:4. Monitor HgB and daily CBC     ID:  - No acute issues     Endo:   - No acute issues. Keep glucose < 180.     MSK:  - No acute issues.  WBAT to all extremities     Bowel reg:                none  DVT ppx:                   SCDs  GI ppx:                      PPI, octreotide  Seizure proph:         none  Mouth/eye care:       Lacrilube, Peridex  Escudero:                        For critical care monitoring of fluid balance  Central lines:            Introducer, arterial line  Family Update:         As available   CODE Status:           FULL      Dispo:                       SICU    Electronically signed by Rylie Blackwood MD on 2/9/2022 at 6:11 AM

## 2022-02-09 NOTE — PROGRESS NOTES
GENERAL SURGERY  DAILY PROGRESS NOTE  2/9/2022    No chief complaint on file. Subjective:  Pt intubated and sedated. No acute issues overnight. Less responsive overnight. Does have pupillary and gag reflex. Objective:  BP (!) 103/49   Pulse 80   Temp 98.7 °F (37.1 °C) (Axillary)   Resp 18   Ht 5' 8\" (1.727 m)   Wt 265 lb 14 oz (120.6 kg)   SpO2 96%   BMI 40.43 kg/m²     GENERAL: Intubated and sedated  HEAD: Normocephalic, atraumatic  EYES: No sclera icterus, pupils equal  LUNGS: On mechanical ventilation  CARDIOVASCULAR:  RR and normotensive  ABDOMEN:  Soft, no grimace to palpation, non-distended. [place] [employer] in place but balloons deflated, minimal output  EXTREMITIES: No edema or swelling  SKIN: Warm and dry    Assessment/Plan:  42 y.o. male with GI hemorrhage s/p esophageal banding x 14 and blakemore tube placement (balloons deflated) s/p TIPS 2/7    - Keep pt NPO  - Continue IVFs  - Blakemore to LIWS  - STAT CT head  - GI recs noted and appreciated, will defer repeat EGD to them if needed  - Monitor H/H Q6  - Monitor ammonia  - Continue octreotide gtt and PPI BID x 72 hours (ends 2/10), transition to PPI BID  - SICU care appreciated    Electronically signed by Elvia Gautam MD on 2/9/2022 at 6:03 AM    As above  Bowels moving, FMS in place with melena as expected  No signs of ongoing hemorrhage  CT head neg  Aspiration?  Fevers  Metabolic encephalopathy suspected with delayed clearance of sedation and poss Etoh withdrawal and post TIPS encephalopathy  Remains coagulopathic with plateau shock liver, cirrhosis  Discussed with GI and intensivist yesterday, SICU resident today- no scopes planned per GI  Await neuro improvement  Family updated    Ilia Merchant MD, MS  Minimally Invasive and Bariatric Surgery  [phone] (p)  2/9/2022  2:24 PM

## 2022-02-09 NOTE — PROGRESS NOTES
Patient continues to pull at ETT, [facility], A-line, both IVs, and other life saving lines/ tubes. After multiple attempts at reorientation, there was no evidence of learning. Bilateral wrist restraints applied for patient safety. There is  No evidence of injury noted at this time. Will continue to monitor.

## 2022-02-09 NOTE — PROGRESS NOTES
For 2/9/2022 1300 labs, please see the 2/9/2022 0000 column labs as the 1300 labs were entered In as the wrong time per lab. Jimmy stated they were fixed on her end but the times are still not correct in patients chart when nursing looks.

## 2022-02-09 NOTE — PLAN OF CARE
Problem: Skin Integrity:  Goal: Absence of new skin breakdown  Description: Absence of new skin breakdown  Outcome: Met This Shift     Problem: Falls - Risk of:  Goal: Will remain free from falls  Description: Will remain free from falls  Outcome: Met This Shift     Problem: Non-Violent Restraints  Goal: Removal from restraints as soon as assessed to be safe  Outcome: Met This Shift  Goal: No harm/injury to patient while restraints in use  Outcome: Met This Shift  Goal: Patient's dignity will be maintained  Outcome: Met This Shift

## 2022-02-09 NOTE — PROGRESS NOTES
Chief Complaint:  Hematemesis     Subjective: Intubated, sedated, unresponsive    Objective:    BP (!) 103/49   Pulse 81   Temp 100.2 °F (37.9 °C) (Axillary)   Resp 18   Ht 5' 8\" (1.727 m)   Wt 264 lb 8.8 oz (120 kg)   SpO2 92%   BMI 40.22 kg/m²     Current medications that patient is taking have been reviewed. General appearance: Ill appearing man on vent  HEENT: AT/NC, ETT  Neck: FROM, supple  Lungs: Clear to auscultation anteriorly, WOB normal  CV: RRR, no MRGs  Abdomen: Soft, non-tender; no masses or HSM, +BS  Extremities: No peripheral edema or digital cyanosis  Skin: no rash, lesions or ulcers  Psych: Calm and cooperative  Neuro: intubated/sedated    Labs:  CBC with Differential:    Lab Results   Component Value Date    WBC 8.5 02/09/2022    RBC 2.68 02/09/2022    HGB 8.2 02/09/2022    HCT 25.0 02/09/2022     02/09/2022    MCV 93.3 02/09/2022    MCH 30.6 02/09/2022    MCHC 32.8 02/09/2022    RDW 18.5 02/09/2022    LYMPHOPCT 3.2 02/07/2022    MONOPCT 8.0 02/07/2022    BASOPCT 0.1 02/07/2022    MONOSABS 0.73 02/07/2022    LYMPHSABS 0.29 02/07/2022    EOSABS 0.00 02/07/2022    BASOSABS 0.01 02/07/2022     CMP:    Lab Results   Component Value Date     02/09/2022    K 3.5 02/09/2022     02/09/2022    CO2 31 02/09/2022    BUN 22 02/09/2022    CREATININE 0.7 02/09/2022    GFRAA >60 02/09/2022    LABGLOM >60 02/09/2022    GLUCOSE 128 02/09/2022    PROT 6.2 02/09/2022    LABALBU 2.8 02/09/2022    CALCIUM 7.5 02/09/2022    BILITOT 3.1 02/09/2022    ALKPHOS 148 02/09/2022    AST 1,847 02/09/2022     02/09/2022          Assessment/Plan:  Principal Problem:    Hematemesis  Active Problems:    Bleeding esophageal varices (HCC)    Alcohol abuse    Acute blood loss anemia    Pulmonary mass    Hyperkalemia    Morbid obesity with BMI of 40.0-44.9, adult (HCC)    Transaminitis    Shock liver  Resolved Problems:    * No resolved hospital problems. *       Severe acute liver injury. Improving with N-acetylcysteine and supportive care. INR stabilized at 2.1. Continue vitamin K.     Hemoglobin stabilized    Hyperkalemia resolved    Phenobarb for etOH withdrawal.  Continue thiamine, folic acid, multivitamin    Lactulose and rifaximin for hyperammonemia/probable HE    IV PPI    Ceftriaxone for SBP ppx    Ocreotide end point per GI / SICU    DVT prophylaxis: SCD  Requires continued inpatient level of care     Trudy Zaldivar MD    2:50 PM  2/9/2022

## 2022-02-09 NOTE — CARE COORDINATION
Pt remains intubated on vent. Episode of unresponsiveness during the night. Stat head ct neg. Sedation held. S/B tube in place. Balloons deflated. Iv Sandostatin drip cont's. Remains Npo. Plan Sbt as able. Dc plan to be determined when stable.

## 2022-02-09 NOTE — FLOWSHEET NOTE
Patient continues to reach for blakemore and ett while performing patient care unable to redirect at this time will continue to monitor

## 2022-02-10 ENCOUNTER — APPOINTMENT (OUTPATIENT)
Dept: GENERAL RADIOLOGY | Age: 47
DRG: 405 | End: 2022-02-10
Attending: INTERNAL MEDICINE
Payer: COMMERCIAL

## 2022-02-10 LAB
AADO2: 193.8 MMHG
ALBUMIN SERPL-MCNC: 3.3 G/DL (ref 3.5–5.2)
ALBUMIN SERPL-MCNC: 3.4 G/DL (ref 3.5–5.2)
ALP BLD-CCNC: 151 U/L (ref 40–129)
ALP BLD-CCNC: 167 U/L (ref 40–129)
ALT SERPL-CCNC: 441 U/L (ref 0–40)
ALT SERPL-CCNC: 500 U/L (ref 0–40)
AMMONIA: 69 UMOL/L (ref 16–60)
ANION GAP SERPL CALCULATED.3IONS-SCNC: 10 MMOL/L (ref 7–16)
ANION GAP SERPL CALCULATED.3IONS-SCNC: 9 MMOL/L (ref 7–16)
AST SERPL-CCNC: 1177 U/L (ref 0–39)
AST SERPL-CCNC: 1419 U/L (ref 0–39)
B.E.: 4.5 MMOL/L (ref -3–3)
BILIRUB SERPL-MCNC: 7.1 MG/DL (ref 0–1.2)
BILIRUB SERPL-MCNC: 8 MG/DL (ref 0–1.2)
BUN BLDV-MCNC: 17 MG/DL (ref 6–20)
BUN BLDV-MCNC: 17 MG/DL (ref 6–20)
CALCIUM IONIZED: 1.19 MMOL/L (ref 1.15–1.33)
CALCIUM SERPL-MCNC: 8 MG/DL (ref 8.6–10.2)
CALCIUM SERPL-MCNC: 8 MG/DL (ref 8.6–10.2)
CHLORIDE BLD-SCNC: 106 MMOL/L (ref 98–107)
CHLORIDE BLD-SCNC: 107 MMOL/L (ref 98–107)
CO2: 26 MMOL/L (ref 22–29)
CO2: 26 MMOL/L (ref 22–29)
COHB: 0.4 % (ref 0–1.5)
CREAT SERPL-MCNC: 0.6 MG/DL (ref 0.7–1.2)
CREAT SERPL-MCNC: 0.6 MG/DL (ref 0.7–1.2)
CRITICAL: ABNORMAL
DATE ANALYZED: ABNORMAL
DATE OF COLLECTION: ABNORMAL
FIO2: 50 %
GFR AFRICAN AMERICAN: >60
GFR AFRICAN AMERICAN: >60
GFR NON-AFRICAN AMERICAN: >60 ML/MIN/1.73
GFR NON-AFRICAN AMERICAN: >60 ML/MIN/1.73
GLUCOSE BLD-MCNC: 114 MG/DL (ref 74–99)
GLUCOSE BLD-MCNC: 125 MG/DL (ref 74–99)
HCO3: 27.9 MMOL/L (ref 22–26)
HCT VFR BLD CALC: 25.9 % (ref 37–54)
HCT VFR BLD CALC: 26.5 % (ref 37–54)
HCT VFR BLD CALC: 26.8 % (ref 37–54)
HEMOGLOBIN: 8.2 G/DL (ref 12.5–16.5)
HEMOGLOBIN: 8.4 G/DL (ref 12.5–16.5)
HEMOGLOBIN: 8.7 G/DL (ref 12.5–16.5)
HHB: 2.4 % (ref 0–5)
INR BLD: 2.1
LAB: ABNORMAL
Lab: ABNORMAL
MAGNESIUM: 2.2 MG/DL (ref 1.6–2.6)
MCH RBC QN AUTO: 30.5 PG (ref 26–35)
MCH RBC QN AUTO: 30.6 PG (ref 26–35)
MCH RBC QN AUTO: 30.7 PG (ref 26–35)
MCHC RBC AUTO-ENTMCNC: 31.7 % (ref 32–34.5)
MCHC RBC AUTO-ENTMCNC: 31.7 % (ref 32–34.5)
MCHC RBC AUTO-ENTMCNC: 32.5 % (ref 32–34.5)
MCV RBC AUTO: 94.7 FL (ref 80–99.9)
MCV RBC AUTO: 96.4 FL (ref 80–99.9)
MCV RBC AUTO: 96.6 FL (ref 80–99.9)
METHB: 0.2 % (ref 0–1.5)
MODE: AC
O2 SATURATION: 97.6 % (ref 92–98.5)
O2HB: 97 % (ref 94–97)
OPERATOR ID: 7221
PATIENT TEMP: 37 C
PCO2: 36.7 MMHG (ref 35–45)
PDW BLD-RTO: 18.3 FL (ref 11.5–15)
PDW BLD-RTO: 18.7 FL (ref 11.5–15)
PDW BLD-RTO: 18.9 FL (ref 11.5–15)
PEEP/CPAP: 8 CMH2O
PFO2: 2.18 MMHG/%
PH BLOOD GAS: 7.5 (ref 7.35–7.45)
PHENOBARBITAL LEVEL: 16.8 MCG/ML (ref 15–40)
PHOSPHORUS: 2.4 MG/DL (ref 2.5–4.5)
PLATELET # BLD: 110 E9/L (ref 130–450)
PLATELET # BLD: 113 E9/L (ref 130–450)
PLATELET # BLD: 146 E9/L (ref 130–450)
PMV BLD AUTO: 11.2 FL (ref 7–12)
PMV BLD AUTO: 11.3 FL (ref 7–12)
PMV BLD AUTO: 11.6 FL (ref 7–12)
PO2: 108.9 MMHG (ref 75–100)
POTASSIUM REFLEX MAGNESIUM: 3.8 MMOL/L (ref 3.5–5)
POTASSIUM REFLEX MAGNESIUM: 3.8 MMOL/L (ref 3.5–5)
PROTHROMBIN TIME: 22.8 SEC (ref 9.3–12.4)
RBC # BLD: 2.68 E12/L (ref 3.8–5.8)
RBC # BLD: 2.75 E12/L (ref 3.8–5.8)
RBC # BLD: 2.83 E12/L (ref 3.8–5.8)
RI(T): 1.78
RR MECHANICAL: 18 B/MIN
SODIUM BLD-SCNC: 142 MMOL/L (ref 132–146)
SODIUM BLD-SCNC: 142 MMOL/L (ref 132–146)
SOURCE, BLOOD GAS: ABNORMAL
THB: 9.9 G/DL (ref 11.5–16.5)
TIME ANALYZED: 435
TOTAL PROTEIN: 6.7 G/DL (ref 6.4–8.3)
TOTAL PROTEIN: 7.1 G/DL (ref 6.4–8.3)
VT MECHANICAL: 500 ML
WBC # BLD: 6.7 E9/L (ref 4.5–11.5)
WBC # BLD: 7.7 E9/L (ref 4.5–11.5)
WBC # BLD: 9.2 E9/L (ref 4.5–11.5)

## 2022-02-10 PROCEDURE — 2580000003 HC RX 258: Performed by: PHYSICIAN ASSISTANT

## 2022-02-10 PROCEDURE — 2000000000 HC ICU R&B

## 2022-02-10 PROCEDURE — 87070 CULTURE OTHR SPECIMN AEROBIC: CPT

## 2022-02-10 PROCEDURE — 85027 COMPLETE CBC AUTOMATED: CPT

## 2022-02-10 PROCEDURE — P9047 ALBUMIN (HUMAN), 25%, 50ML: HCPCS | Performed by: SURGERY

## 2022-02-10 PROCEDURE — 2580000003 HC RX 258: Performed by: STUDENT IN AN ORGANIZED HEALTH CARE EDUCATION/TRAINING PROGRAM

## 2022-02-10 PROCEDURE — 6370000000 HC RX 637 (ALT 250 FOR IP): Performed by: STUDENT IN AN ORGANIZED HEALTH CARE EDUCATION/TRAINING PROGRAM

## 2022-02-10 PROCEDURE — 6360000002 HC RX W HCPCS: Performed by: STUDENT IN AN ORGANIZED HEALTH CARE EDUCATION/TRAINING PROGRAM

## 2022-02-10 PROCEDURE — 6370000000 HC RX 637 (ALT 250 FOR IP): Performed by: SURGERY

## 2022-02-10 PROCEDURE — 6360000002 HC RX W HCPCS: Performed by: INTERNAL MEDICINE

## 2022-02-10 PROCEDURE — 36415 COLL VENOUS BLD VENIPUNCTURE: CPT

## 2022-02-10 PROCEDURE — 94003 VENT MGMT INPAT SUBQ DAY: CPT

## 2022-02-10 PROCEDURE — 85610 PROTHROMBIN TIME: CPT

## 2022-02-10 PROCEDURE — 2500000003 HC RX 250 WO HCPCS: Performed by: SURGERY

## 2022-02-10 PROCEDURE — 6360000002 HC RX W HCPCS: Performed by: SURGERY

## 2022-02-10 PROCEDURE — 2500000003 HC RX 250 WO HCPCS: Performed by: STUDENT IN AN ORGANIZED HEALTH CARE EDUCATION/TRAINING PROGRAM

## 2022-02-10 PROCEDURE — 83735 ASSAY OF MAGNESIUM: CPT

## 2022-02-10 PROCEDURE — 71045 X-RAY EXAM CHEST 1 VIEW: CPT

## 2022-02-10 PROCEDURE — C9113 INJ PANTOPRAZOLE SODIUM, VIA: HCPCS | Performed by: INTERNAL MEDICINE

## 2022-02-10 PROCEDURE — 2580000003 HC RX 258: Performed by: INTERNAL MEDICINE

## 2022-02-10 PROCEDURE — 2580000003 HC RX 258: Performed by: SURGERY

## 2022-02-10 PROCEDURE — 82140 ASSAY OF AMMONIA: CPT

## 2022-02-10 PROCEDURE — 80184 ASSAY OF PHENOBARBITAL: CPT

## 2022-02-10 PROCEDURE — 82805 BLOOD GASES W/O2 SATURATION: CPT

## 2022-02-10 PROCEDURE — 82330 ASSAY OF CALCIUM: CPT

## 2022-02-10 PROCEDURE — 80053 COMPREHEN METABOLIC PANEL: CPT

## 2022-02-10 PROCEDURE — 84100 ASSAY OF PHOSPHORUS: CPT

## 2022-02-10 PROCEDURE — 87206 SMEAR FLUORESCENT/ACID STAI: CPT

## 2022-02-10 PROCEDURE — 37799 UNLISTED PX VASCULAR SURGERY: CPT

## 2022-02-10 PROCEDURE — 99291 CRITICAL CARE FIRST HOUR: CPT | Performed by: SURGERY

## 2022-02-10 RX ORDER — SODIUM CHLORIDE, SODIUM LACTATE, POTASSIUM CHLORIDE, AND CALCIUM CHLORIDE .6; .31; .03; .02 G/100ML; G/100ML; G/100ML; G/100ML
500 INJECTION, SOLUTION INTRAVENOUS ONCE
Status: COMPLETED | OUTPATIENT
Start: 2022-02-10 | End: 2022-02-10

## 2022-02-10 RX ORDER — ONDANSETRON 2 MG/ML
4 INJECTION INTRAMUSCULAR; INTRAVENOUS EVERY 6 HOURS PRN
Status: DISCONTINUED | OUTPATIENT
Start: 2022-02-10 | End: 2022-02-22 | Stop reason: HOSPADM

## 2022-02-10 RX ORDER — LACTULOSE 10 G/15ML
20 SOLUTION ORAL 3 TIMES DAILY
Status: DISCONTINUED | OUTPATIENT
Start: 2022-02-10 | End: 2022-02-18

## 2022-02-10 RX ORDER — ALBUMIN (HUMAN) 12.5 G/50ML
25 SOLUTION INTRAVENOUS EVERY 8 HOURS
Status: COMPLETED | OUTPATIENT
Start: 2022-02-10 | End: 2022-02-12

## 2022-02-10 RX ORDER — FENTANYL CITRATE 50 UG/ML
25 INJECTION, SOLUTION INTRAMUSCULAR; INTRAVENOUS ONCE
Status: COMPLETED | OUTPATIENT
Start: 2022-02-10 | End: 2022-02-10

## 2022-02-10 RX ORDER — MULTIVIT WITH IRON,MINERALS
15 LIQUID (ML) ORAL DAILY
Status: DISCONTINUED | OUTPATIENT
Start: 2022-02-10 | End: 2022-02-22 | Stop reason: HOSPADM

## 2022-02-10 RX ADMIN — LACTULOSE 20 G: 20 SOLUTION ORAL at 05:03

## 2022-02-10 RX ADMIN — RIFAXIMIN 550 MG: 550 TABLET ORAL at 20:52

## 2022-02-10 RX ADMIN — POLYVINYL ALCOHOL 1 DROP: 14 SOLUTION/ DROPS OPHTHALMIC at 08:41

## 2022-02-10 RX ADMIN — POLYVINYL ALCOHOL 1 DROP: 14 SOLUTION/ DROPS OPHTHALMIC at 19:56

## 2022-02-10 RX ADMIN — FENTANYL CITRATE 25 MCG: 50 INJECTION, SOLUTION INTRAMUSCULAR; INTRAVENOUS at 06:23

## 2022-02-10 RX ADMIN — POLYVINYL ALCOHOL 1 DROP: 14 SOLUTION/ DROPS OPHTHALMIC at 15:44

## 2022-02-10 RX ADMIN — LACTULOSE 20 G: 10 SOLUTION ORAL at 20:52

## 2022-02-10 RX ADMIN — FOLIC ACID 1 MG: 5 INJECTION, SOLUTION INTRAMUSCULAR; INTRAVENOUS; SUBCUTANEOUS at 10:56

## 2022-02-10 RX ADMIN — CEFTRIAXONE 2000 MG: 2 INJECTION, POWDER, FOR SOLUTION INTRAMUSCULAR; INTRAVENOUS at 20:51

## 2022-02-10 RX ADMIN — MINERAL OIL AND WHITE PETROLATUM: 150; 830 OINTMENT OPHTHALMIC at 14:10

## 2022-02-10 RX ADMIN — PANTOPRAZOLE SODIUM 40 MG: 40 INJECTION, POWDER, FOR SOLUTION INTRAVENOUS at 08:31

## 2022-02-10 RX ADMIN — THIAMINE HYDROCHLORIDE 500 MG: 100 INJECTION, SOLUTION INTRAMUSCULAR; INTRAVENOUS at 08:27

## 2022-02-10 RX ADMIN — MULTIVITAMIN 15 ML: LIQUID ORAL at 08:27

## 2022-02-10 RX ADMIN — ALBUMIN (HUMAN) 25 G: 0.25 INJECTION, SOLUTION INTRAVENOUS at 00:59

## 2022-02-10 RX ADMIN — FENTANYL CITRATE 25 MCG: 50 INJECTION, SOLUTION INTRAMUSCULAR; INTRAVENOUS at 08:04

## 2022-02-10 RX ADMIN — MINERAL OIL AND WHITE PETROLATUM: 150; 830 OINTMENT OPHTHALMIC at 22:30

## 2022-02-10 RX ADMIN — MINERAL OIL AND WHITE PETROLATUM: 150; 830 OINTMENT OPHTHALMIC at 08:29

## 2022-02-10 RX ADMIN — MINERAL OIL AND WHITE PETROLATUM: 150; 830 OINTMENT OPHTHALMIC at 17:18

## 2022-02-10 RX ADMIN — ALBUMIN (HUMAN) 25 G: 0.25 INJECTION, SOLUTION INTRAVENOUS at 08:31

## 2022-02-10 RX ADMIN — POLYVINYL ALCOHOL 1 DROP: 14 SOLUTION/ DROPS OPHTHALMIC at 04:35

## 2022-02-10 RX ADMIN — RIFAXIMIN 550 MG: 550 TABLET ORAL at 08:29

## 2022-02-10 RX ADMIN — MINERAL OIL AND WHITE PETROLATUM: 150; 830 OINTMENT OPHTHALMIC at 06:23

## 2022-02-10 RX ADMIN — FENTANYL CITRATE 25 MCG: 50 INJECTION, SOLUTION INTRAMUSCULAR; INTRAVENOUS at 23:12

## 2022-02-10 RX ADMIN — FENTANYL CITRATE 25 MCG: 50 INJECTION, SOLUTION INTRAMUSCULAR; INTRAVENOUS at 17:30

## 2022-02-10 RX ADMIN — FENTANYL CITRATE 25 MCG: 50 INJECTION, SOLUTION INTRAMUSCULAR; INTRAVENOUS at 19:35

## 2022-02-10 RX ADMIN — POTASSIUM PHOSPHATE, MONOBASIC POTASSIUM PHOSPHATE, DIBASIC 15 MMOL: 224; 236 INJECTION, SOLUTION, CONCENTRATE INTRAVENOUS at 14:10

## 2022-02-10 RX ADMIN — POLYVINYL ALCOHOL 1 DROP: 14 SOLUTION/ DROPS OPHTHALMIC at 11:17

## 2022-02-10 RX ADMIN — CHLORHEXIDINE GLUCONATE 0.12% ORAL RINSE 15 ML: 1.2 LIQUID ORAL at 08:31

## 2022-02-10 RX ADMIN — ALBUMIN (HUMAN) 25 G: 0.25 INJECTION, SOLUTION INTRAVENOUS at 17:34

## 2022-02-10 RX ADMIN — Medication 10 ML: at 20:51

## 2022-02-10 RX ADMIN — FENTANYL CITRATE 25 MCG: 50 INJECTION, SOLUTION INTRAMUSCULAR; INTRAVENOUS at 14:10

## 2022-02-10 RX ADMIN — SODIUM CHLORIDE, PRESERVATIVE FREE 10 ML: 5 INJECTION INTRAVENOUS at 10:56

## 2022-02-10 RX ADMIN — PANTOPRAZOLE SODIUM 40 MG: 40 INJECTION, POWDER, FOR SOLUTION INTRAVENOUS at 20:51

## 2022-02-10 RX ADMIN — MINERAL OIL AND WHITE PETROLATUM: 150; 830 OINTMENT OPHTHALMIC at 02:21

## 2022-02-10 RX ADMIN — SODIUM CHLORIDE, PRESERVATIVE FREE 10 ML: 5 INJECTION INTRAVENOUS at 19:38

## 2022-02-10 RX ADMIN — ONDANSETRON 4 MG: 2 INJECTION INTRAMUSCULAR; INTRAVENOUS at 02:38

## 2022-02-10 RX ADMIN — FENTANYL CITRATE 25 MCG: 50 INJECTION, SOLUTION INTRAMUSCULAR; INTRAVENOUS at 00:11

## 2022-02-10 RX ADMIN — POLYVINYL ALCOHOL 1 DROP: 14 SOLUTION/ DROPS OPHTHALMIC at 00:59

## 2022-02-10 RX ADMIN — LACTULOSE 20 G: 10 SOLUTION ORAL at 14:11

## 2022-02-10 RX ADMIN — SODIUM CHLORIDE, POTASSIUM CHLORIDE, SODIUM LACTATE AND CALCIUM CHLORIDE 500 ML: 600; 310; 30; 20 INJECTION, SOLUTION INTRAVENOUS at 17:34

## 2022-02-10 RX ADMIN — FENTANYL CITRATE 25 MCG: 50 INJECTION, SOLUTION INTRAMUSCULAR; INTRAVENOUS at 03:42

## 2022-02-10 RX ADMIN — PHYTONADIONE 10 MG: 10 INJECTION, EMULSION INTRAMUSCULAR; INTRAVENOUS; SUBCUTANEOUS at 08:27

## 2022-02-10 RX ADMIN — Medication 10 ML: at 08:40

## 2022-02-10 RX ADMIN — CHLORHEXIDINE GLUCONATE 0.12% ORAL RINSE 15 ML: 1.2 LIQUID ORAL at 20:52

## 2022-02-10 ASSESSMENT — PULMONARY FUNCTION TESTS
PIF_VALUE: 41
PIF_VALUE: 51
PIF_VALUE: 35
PIF_VALUE: 36
PIF_VALUE: 43
PIF_VALUE: 33
PIF_VALUE: 42
PIF_VALUE: 35
PIF_VALUE: 39
PIF_VALUE: 35
PIF_VALUE: 37
PIF_VALUE: 44
PIF_VALUE: 41
PIF_VALUE: 36
PIF_VALUE: 36
PIF_VALUE: 43
PIF_VALUE: 37
PIF_VALUE: 44
PIF_VALUE: 35
PIF_VALUE: 45
PIF_VALUE: 16
PIF_VALUE: 50
PIF_VALUE: 34
PIF_VALUE: 39
PIF_VALUE: 37
PIF_VALUE: 50
PIF_VALUE: 43
PIF_VALUE: 37
PIF_VALUE: 35

## 2022-02-10 ASSESSMENT — PAIN SCALES - GENERAL
PAINLEVEL_OUTOF10: 0
PAINLEVEL_OUTOF10: 0
PAINLEVEL_OUTOF10: 8
PAINLEVEL_OUTOF10: 0

## 2022-02-10 NOTE — PLAN OF CARE
Problem: Skin Integrity:  Goal: Will show no infection signs and symptoms  Description: Will show no infection signs and symptoms  Outcome: Met This Shift  Goal: Absence of new skin breakdown  Description: Absence of new skin breakdown  Outcome: Met This Shift     Problem: Falls - Risk of:  Goal: Will remain free from falls  Description: Will remain free from falls  Outcome: Met This Shift  Goal: Absence of physical injury  Description: Absence of physical injury  Outcome: Met This Shift     Problem: Non-Violent Restraints  Goal: Removal from restraints as soon as assessed to be safe  Outcome: Met This Shift  Goal: No harm/injury to patient while restraints in use  Outcome: Met This Shift  Goal: Patient's dignity will be maintained  Outcome: Met This Shift

## 2022-02-10 NOTE — PROGRESS NOTES
Chief Complaint:  Hematemesis     Subjective: Intubated, sedated, unresponsive    Objective:    BP (!) 103/49   Pulse 64   Temp 98 °F (36.7 °C) (Axillary)   Resp 18   Ht 5' 8\" (1.727 m)   Wt 264 lb 8.8 oz (120 kg)   SpO2 97%   BMI 40.22 kg/m²     Current medications that patient is taking have been reviewed. General appearance: Ill appearing man on vent  HEENT: AT/NC, ETT  Neck: FROM, supple  Lungs: Clear to auscultation anteriorly, WOB normal  CV: RRR, no MRGs  Abdomen: Soft, less distended than before, non-tender; no masses or HSM, +BS  Extremities: No peripheral edema or digital cyanosis  Skin: Somewhat jaundiced  Psych: Calm and cooperative  Neuro: intubated/sedated    Labs:  CBC with Differential:    Lab Results   Component Value Date    WBC 7.7 02/10/2022    RBC 2.83 02/10/2022    HGB 8.7 02/10/2022    HCT 26.8 02/10/2022     02/10/2022    MCV 94.7 02/10/2022    MCH 30.7 02/10/2022    MCHC 32.5 02/10/2022    RDW 18.3 02/10/2022    LYMPHOPCT 3.2 02/07/2022    MONOPCT 8.0 02/07/2022    BASOPCT 0.1 02/07/2022    MONOSABS 0.73 02/07/2022    LYMPHSABS 0.29 02/07/2022    EOSABS 0.00 02/07/2022    BASOSABS 0.01 02/07/2022     CMP:    Lab Results   Component Value Date     02/10/2022    K 3.8 02/10/2022     02/10/2022    CO2 26 02/10/2022    BUN 17 02/10/2022    CREATININE 0.6 02/10/2022    GFRAA >60 02/10/2022    LABGLOM >60 02/10/2022    GLUCOSE 125 02/10/2022    PROT 7.1 02/10/2022    LABALBU 3.4 02/10/2022    CALCIUM 8.0 02/10/2022    BILITOT 7.1 02/10/2022    ALKPHOS 167 02/10/2022    AST 1,419 02/10/2022     02/10/2022          Assessment/Plan:  Principal Problem:    Hematemesis  Active Problems:    Bleeding esophageal varices (HCC)    Alcohol abuse    Acute blood loss anemia    Pulmonary mass    Hyperkalemia    Morbid obesity with BMI of 40.0-44.9, adult (HCC)    Transaminitis    Shock liver  Resolved Problems:    * No resolved hospital problems.  *       Severe acute liver injury. Improving with N-acetylcysteine and supportive care. INR stabilized at 2.1. Continue vitamin K.     Hemoglobin stabilized    Phenobarb for etOH withdrawal.  Continue thiamine, folic acid, multivitamin    Lactulose and rifaximin for hyperammonemia/probable HE (ammonia improving)    IV PPI    Ceftriaxone for SBP ppx    Ocreotide end point per GI / SICU    DVT prophylaxis: SCD  Requires continued inpatient level of care     Suni Guillermo MD    12:44 PM  2/10/2022

## 2022-02-10 NOTE — PROGRESS NOTES
Surgical Intensive Care Unit   Daily Progress Note     Patient's name:  Ana M Quiros  Age/Gender: 42 y.o. male  Date of Admission: 2/7/2022  6:01 AM  Length of Stay: 3    Reason for ICU: hemodynamic instability      Hospital Course:   2/6 Presented to [facility] with abdominal pain, nausea and hematemesis. Received 2 units of pRBCs. 2/7: Underwent EGD with 14 bands of esophageal varices. Still bleeding so Blakemore tube inserted. Plan for transfer to [facility] for TIPS procedure and advanced GI intervention. 2/8: Gastric bubble deflated on Blakemore tube. Patient agitated. Patient started on Lactulose. 2/9: Patient became increasingly more lethargic. Sedation stopped. Patient still without bowel movement. Started on Lactulose 20mg q2. Overnight Events:   Patient started on 2.5 fentanyl prn. Agitated overnight. Continues to be febrile. Problem List:   Patient Active Problem List   Diagnosis    GI bleed    Bleeding esophageal varices (HCC)    Alcohol abuse    Acute blood loss anemia    Pulmonary mass    Hyperkalemia    Hematemesis    Morbid obesity with BMI of 40.0-44.9, adult ([address]    Transaminitis    Shock liver       Vent Settings: Additional Respiratory  Assessments  Pulse: 61  Resp: 18  SpO2: 98 %  Position: Semi-Rico's  Humidification Source: Heated wire  Humidification Temp: 37  Circuit Condensation: Drained  Oral Care: Suction toothette,Mouthwash with chlorhexidine,Mouth suctioned,Mouth swabbed,Mouth moisturizer  Airway Type: ET  Airway Size: 7.5  ABG:   Recent Labs     02/10/22  0435   PH 7.499*   PCO2 36.7   PO2 108.9*   HCO3 27.9*   BE 4.5*   O2SAT 97.6       I/O:  I/O last 3 completed shifts: In: 7738 [P.O.:270;  I.V.:3022; NG/GT:1090; IV Piggyback:3356]  Out: 4700 [Urine:3675; Emesis/NG output:325; EHQJK:157]  I/O this shift:  In: 086 [NG/GT:120; IV Piggyback:257]  Out: 8460 [Urine:1035; Emesis/NG output:200; Stool:1200]  Urethral Catheter-Output (mL): 75 mL  [REMOVED] Urethral Catheter 16 fr-Output (mL): 75 mL  NG/OG/NJ/NE Tube Nasogastric Left nostril-Output (mL): 150 ml  Stool (measured) : 0 mL    Lines:   LIJ central line (8)  R radial arterial line (2)    Tubes:   Blakemore tube 92/7)  ET ()    Drains:   Yeung ()    Drips:   [Held by provider] propofol Stopped (22 0554)    [Held by provider] fentaNYL 5 mcg/ml in 0.9%  ml infusion Stopped (22 05)    octreotide (SANDOSTATIN) infusion 25 mcg/hr (22 456)    sodium chloride      dextrose         Physical Exam:   BP (!) 103/49   Pulse 61   Temp 98.9 °F (37.2 °C) (Axillary)   Resp 18   Ht 5' 8\" (1.727 m)   Wt 264 lb 8.8 oz (120 kg)   SpO2 98%   BMI 40.22 kg/m²     Average, Min, and Max for last 24 hours Vitals:  Temp:  Temp  Av.2 °F (37.9 °C)  Min: 98.9 °F (37.2 °C)  Max: 101.9 °F (38.8 °C)  RR: Resp  Av.9  Min: 15  Max: 22  HR: Pulse  Av  Min: 61  Max: 108  BP:  No data recorded.  ; No data recorded. SpO2: SpO2  Av.9 %  Min: 92 %  Max: 98 %        GCS:    3T    Pupil size:  Left 2 mm    Right 2 mm    Pupil reaction: Yes    Wiggles fingers: Left No Right No    Hand grasp:   Left none       Right none    Wiggles toes: Left No    Right No    Plantar flexion: Left none     Right none      CONSTITUTIONAL: no acute distress, intubated and sedated  NEUROLOGIC: PERRL   CARDIOVASCULAR: S1 S2, regular rate, regular rhythm, no murmur/gallop/rub  PULMONARY: no rhonchi/rales/wheezes. AC/VC PEEP 8 FiO2 50%  RENAL: yeung to gravity, clear yellow urine  ABDOMEN: soft, nontender, moderately distended, nontympanic, no masses, no organomegaly, Blakemore tube in place. SKIN/EXTREMITIES: no rashes/ecchymosis, no edema/clubbing, warm/dry, good capillary refill       ASSESSMENT / PLAN:   Neuro:             - Pain/Sedation:  fentanyl prn  - Alcohol abuse: Phenobarbital held  due to altered mental status.  Monitor for signs/symptoms of withdrawal  - Hyperammonemia: Lactulose and Rifaximin.      CV:  - Hemorrhagic shock- resolved. Continue to monitor hemodynamics     Pulm:   - Acute respiratory failure: On ventilator AC/VC PEEP 8 FiO2 50%. SBT.     Renal:  - hyperkalemia: resolved. Continue to monitor daily CMP. - hypocalcemia: replace as needed  - hypomagnesemia: replace as needed  - Lactic acidosis: resolved. Continue to monitor     GI:                    - Diet:  NPO  - variceal hemorrhage: s/p IR TIPS procedure 2/7. Blakemore tube in place, GI following, Monitor HgB, discontinue Octreotide gtt, continue PPI  - elevated transaminases: NAC completed 2/9  - SBP: Rocephin 5-7 days  - ascites: IR paracentesis at some point     Heme:  - acute blood loss anemia: 4:1:4. Monitor HgB and daily CBC     ID:  - No acute issues     Endo:   - No acute issues. Keep glucose < 180.     MSK:  - No acute issues.  WBAT to all extremities     Bowel reg:                none  DVT ppx:                   SCDs  GI ppx:                      PPI  Seizure proph:         none  Mouth/eye care:       Lacrilube, Peridex  Escudero:                        For critical care monitoring of fluid balance  Central lines:            Introducer, arterial line  Family Update:         As available   CODE Status:           FULL      Dispo:                       SICU    Electronically signed by Greta Coats MD on 2/10/2022 at 6:25 AM

## 2022-02-10 NOTE — FLOWSHEET NOTE
Patient continues to try and pull out ETT/ Maryclare Lesser more and other line. After multiple attempts at reorientation, there was no evidence of learning. Bilateral wrist restraints applied for patient safety. No signs of injury noted at this time. Will continue to monitor.

## 2022-02-10 NOTE — CARE COORDINATION
Remains intubated on vent/ AC mode. Sandostatin drip stopped. Placed on iv Protonix bid. On lactulose tid. FMS in place. Sb tube in place with balloons deflated. Remains off sedation. Intermittent agitation. Iv rocephin cont's prophylactically. Dc plan to be determined when more medically stable.

## 2022-02-10 NOTE — PLAN OF CARE
Problem: Skin Integrity:  Goal: Will show no infection signs and symptoms  Description: Will show no infection signs and symptoms  Outcome: Met This Shift  Goal: Absence of new skin breakdown  Description: Absence of new skin breakdown  2/10/2022 0040 by Yamile Thomas RN  Outcome: Met This Shift  2/9/2022 1818 by Jacob Villalobos RN  Outcome: Met This Shift     Problem: Falls - Risk of:  Goal: Will remain free from falls  Description: Will remain free from falls  2/10/2022 0040 by Yamile Thomas RN  Outcome: Met This Shift  2/9/2022 1818 by Jacob Villalobos RN  Outcome: Met This Shift  Goal: Absence of physical injury  Description: Absence of physical injury  Outcome: Met This Shift     Problem: Non-Violent Restraints  Goal: Removal from restraints as soon as assessed to be safe  2/10/2022 0040 by Yamile Thomas RN  Outcome: Not Met This Shift  2/9/2022 2114 by Yamile Thomas RN  Outcome: Not Met This Shift  2/9/2022 1818 by Jacob Villalobos RN  Outcome: Met This Shift  Goal: No harm/injury to patient while restraints in use  2/10/2022 0040 by Yamile Thomas RN  Outcome: Met This Shift  2/9/2022 2114 by Yamile Thomas RN  Outcome: Met This Shift  2/9/2022 1818 by Jacob Villalobos RN  Outcome: Met This Shift  Goal: Patient's dignity will be maintained  2/10/2022 0040 by Yamile Thomas RN  Outcome: Met This Shift  2/9/2022 2114 by Yamile Thomas RN  Outcome: Met This Shift  2/9/2022 1818 by Jacob Villalobos RN  Outcome: Met This Shift     Problem: Discharge Planning:  Goal: Participates in care planning  Description: Participates in care planning  Outcome: Met This Shift  Goal: Discharged to appropriate level of care  Description: Discharged to appropriate level of care  Outcome: Met This Shift     Problem: Airway Clearance - Ineffective:  Goal: Ability to maintain a clear airway will improve  Description: Ability to maintain a clear airway will improve  Outcome: Met This Shift     Problem: Anxiety/Stress:  Goal: Level of anxiety will decrease  Description: Level of anxiety will decrease  Outcome: Met This Shift     Problem: Aspiration:  Goal: Absence of aspiration  Description: Absence of aspiration  Outcome: Met This Shift     Problem: Bowel Function - Altered:  Goal: Bowel elimination is within specified parameters  Description: Bowel elimination is within specified parameters  Outcome: Met This Shift     Problem: Cardiac Output - Decreased:  Goal: Hemodynamic stability will improve  Description: Hemodynamic stability will improve  Outcome: Met This Shift     Problem: Fluid Volume - Imbalance:  Goal: Absence of imbalanced fluid volume signs and symptoms  Description: Absence of imbalanced fluid volume signs and symptoms  Outcome: Met This Shift     Problem: Gas Exchange - Impaired:  Goal: Levels of oxygenation will improve  Description: Levels of oxygenation will improve  Outcome: Met This Shift     Problem: Mental Status - Impaired:  Goal: Mental status will be restored to baseline  Description: Mental status will be restored to baseline  Outcome: Met This Shift     Problem: Nutrition Deficit:  Goal: Ability to achieve adequate nutritional intake will improve  Description: Ability to achieve adequate nutritional intake will improve  Outcome: Met This Shift     Problem: Pain:  Description: Pain management should include both nonpharmacologic and pharmacologic interventions.   Goal: Pain level will decrease  Description: Pain level will decrease  Outcome: Met This Shift  Goal: Recognizes and communicates pain  Description: Recognizes and communicates pain  Outcome: Met This Shift  Goal: Control of acute pain  Description: Control of acute pain  Outcome: Met This Shift  Goal: Control of chronic pain  Description: Control of chronic pain  Outcome: Met This Shift     Problem: Skin Integrity - Impaired:  Goal: Will show no infection signs and symptoms  Description: Will show no infection signs and symptoms  Outcome: Met This Shift  Goal: Absence of new skin breakdown  Description: Absence of new skin breakdown  Outcome: Met This Shift     Problem: Sleep Pattern Disturbance:  Goal: Appears well-rested  Description: Appears well-rested  Outcome: Met This Shift     Problem: Tissue Perfusion, Altered:  Goal: Circulatory function within specified parameters  Description: Circulatory function within specified parameters  Outcome: Met This Shift     Problem: Tissue Perfusion - Cardiopulmonary, Altered:  Goal: Absence of angina  Description: Absence of angina  Outcome: Met This Shift  Goal: Hemodynamic stability will improve  Description: Hemodynamic stability will improve  Outcome: Met This Shift

## 2022-02-10 NOTE — FLOWSHEET NOTE
Pt Rass +2, gagging, coughing, moving head side to side. Does not follow verbal commands or calm with verbal reassurance. Dr. Boswell Every notified, order to give 50 mcg Fentanyl IV now, med given. Pt calmed after med administration. Dr Boswell Every at bedside.

## 2022-02-10 NOTE — FLOWSHEET NOTE
Pt having intermittent bursts of agitation, gagging. Heart rate, bp and respiratory rate elevated. Dr. Claribel Mayer notified via [place].

## 2022-02-10 NOTE — PLAN OF CARE
Problem: Non-Violent Restraints  Goal: Removal from restraints as soon as assessed to be safe  2/9/2022 2114 by Ling Gautam RN  Outcome: Not Met This Shift  2/9/2022 1818 by Connie Robins RN  Outcome: Met This Shift  Goal: No harm/injury to patient while restraints in use  2/9/2022 2114 by Ling Gautam RN  Outcome: Met This Shift  2/9/2022 1818 by Connie Robins RN  Outcome: Met This Shift  Goal: Patient's dignity will be maintained  2/9/2022 2114 by Ling Gautam RN  Outcome: Met This Shift  2/9/2022 1818 by Connie Robins RN  Outcome: Met This Shift

## 2022-02-10 NOTE — PROGRESS NOTES
[facility]  SURGICAL INTENSIVE CARE UNIT (SICU)  ATTENDING PHYSICIAN CRITICAL CARE PROGRESS NOTE     I have examined the patient, reviewed the record, and discussed the case with the APN/ resident. Please refer to the APN/ resident's note. I agree with the assessment and plan. I have reviewed all relevant labs and imaging data. The following summarizes my clinical findings and independent assessment. CC:  Critical care management for GI bleed    Hospital Course/Overnight Events:  2/6--presented to [address] [place] [address] with hematemesis  2/7--underwent EGD with esophageal variceal banding--continued bleeding and SB tube placed; transferred here for TIPS; underwent TIPS this AM  2/8--both esophageal and gastric balloons deflated yesterday--no evidence of ongoing hemorrhage  2/9--unresponsive overnight; not following commands; sedation held this AM  2/10--intermittent agitation overnight; febrile overnight    Intubated  Pupils: 2 mm and reactive bilaterally  Sclera:  Icteric  Conjunctiva: pink-red  No eye opening  Minimal response to pain  + cough  Heart: Regular rate/rhythm; no murmur  Lungs: Fairly clear bilaterally  Abdomen: Distended; bowel sounds active  Skin: Warm/dry  Extremities: No edema; distal pulses readily detectable  SP tube in place--gastric and esophageal balloons deflated    Labs/films personally reviewed--no obvious infiltrates on CXR    Patient Active Problem List    Diagnosis Date Noted    Shock liver 02/08/2022    GI bleed 02/07/2022    Bleeding esophageal varices ([facility] [address] 02/07/2022    Alcohol abuse 02/07/2022    Acute blood loss anemia 02/07/2022    Pulmonary mass 02/07/2022    Hyperkalemia 02/07/2022    Hematemesis 02/07/2022    Morbid obesity with BMI of 40.0-44.9, adult ([facility] [address] 02/07/2022    Transaminitis 02/07/2022       GI bleed secondary to bleeding esophageal varices--status post EGD with banding but with ongoing bleeding and subsequent SB tube placement--esophageal and gastric balloon now deflated; on octreotide--will stop; PPI  Status post TIPS  EtOH abuse--hold phenobarb due to change in mental status  Acute respiratory failure--continue mechanical vent support; attempt spont breathing trial  Shock liver/elevated LFTs--monitor labs  Hyperammonemia--cont lactulose  Cont rifaxamin  Rocephin for SBP prophylaxis  Altered LOC/hepatic encephalopathy--monitor neuro exam  Hypoalbuminemia/moderate protein calorie malnutrition--start TF  Acute blood loss anemia--monitor H/H  Thrombocytopenia--likely consumptive--cont to monitor  DVT risk--PCDs    Patient is at significant risk for hemodynamic/metabolic/respiratory deterioration requires ongoing ICU care    Chapito Jean MD, FACS  2/10/2022  7:25 AM      Critical care time exclusive of teaching and procedures = 39 minutes       NOTE: This report was transcribed using voice recognition software. Every effort was made to ensure accuracy; however, inadvertent computerized transcription errors may be present.

## 2022-02-10 NOTE — FLOWSHEET NOTE
Pt will reach for lines, tubes, and drains and fails to redirect to repeated verbal commands. Restraints secured for patient safety.

## 2022-02-10 NOTE — PROGRESS NOTES
GENERAL SURGERY  DAILY PROGRESS NOTE  2/10/2022    No chief complaint on file. Subjective:  Pt intubated and sedated. Febrile again overnight. Objective:  BP (!) 103/49   Pulse 61   Temp 98.9 °F (37.2 °C) (Axillary)   Resp 18   Ht 5' 8\" (1.727 m)   Wt 264 lb 8.8 oz (120 kg)   SpO2 98%   BMI 40.22 kg/m²     GENERAL: Intubated and sedated  HEAD: Normocephalic, atraumatic  EYES: No sclera icterus, pupils equal  LUNGS: On mechanical ventilation  CARDIOVASCULAR:  RR and normotensive  ABDOMEN:  Soft, no grimace to palpation, non-distended.  [place] in place but balloons deflated, minimal output  EXTREMITIES: No edema or swelling  SKIN: Warm and dry    Assessment/Plan:  42 y.o. male with GI hemorrhage s/p esophageal banding x 14 and blakemore tube placement (balloons deflated) s/p TIPS 2/7    - Keep pt NPO  - Continue IVFs  - Blakemore to LIWS  - CT head negative for intracranial pathology  - GI recs noted and appreciated, will defer repeat EGD to them if needed  - Monitor H/H Q6  - Monitor ammonia, continue lactulose  - Off PPI gtt, now on BID  - Continue octreotide gtt (ends 2/10)  - SICU care appreciated    Electronically signed by Ben Whatley MD on 2/10/2022 at 6:53 AM

## 2022-02-11 ENCOUNTER — APPOINTMENT (OUTPATIENT)
Dept: GENERAL RADIOLOGY | Age: 47
DRG: 405 | End: 2022-02-11
Attending: INTERNAL MEDICINE
Payer: COMMERCIAL

## 2022-02-11 LAB
AADO2: 162.4 MMHG
ALBUMIN SERPL-MCNC: 3.5 G/DL (ref 3.5–5.2)
ALBUMIN SERPL-MCNC: 3.7 G/DL (ref 3.5–5.2)
ALP BLD-CCNC: 153 U/L (ref 40–129)
ALP BLD-CCNC: 154 U/L (ref 40–129)
ALPHA-1 ANTITRYPSIN: 138 MG/DL (ref 90–200)
ALT SERPL-CCNC: 396 U/L (ref 0–40)
ALT SERPL-CCNC: 415 U/L (ref 0–40)
AMMONIA: 46 UMOL/L (ref 16–60)
ANION GAP SERPL CALCULATED.3IONS-SCNC: 10 MMOL/L (ref 7–16)
ANION GAP SERPL CALCULATED.3IONS-SCNC: 9 MMOL/L (ref 7–16)
AST SERPL-CCNC: 882 U/L (ref 0–39)
AST SERPL-CCNC: 945 U/L (ref 0–39)
B.E.: 1.9 MMOL/L (ref -3–3)
B.E.: 2.7 MMOL/L (ref -3–3)
BILIRUB SERPL-MCNC: 8.3 MG/DL (ref 0–1.2)
BILIRUB SERPL-MCNC: 8.6 MG/DL (ref 0–1.2)
BUN BLDV-MCNC: 16 MG/DL (ref 6–20)
BUN BLDV-MCNC: 16 MG/DL (ref 6–20)
CALCIUM IONIZED: 1.23 MMOL/L (ref 1.15–1.33)
CALCIUM SERPL-MCNC: 8.1 MG/DL (ref 8.6–10.2)
CALCIUM SERPL-MCNC: 8.5 MG/DL (ref 8.6–10.2)
CERULOPLASMIN: 19 MG/DL (ref 15–30)
CERULOPLASMIN: 20 MG/DL (ref 15–30)
CHLORIDE BLD-SCNC: 108 MMOL/L (ref 98–107)
CHLORIDE BLD-SCNC: 110 MMOL/L (ref 98–107)
CO2: 27 MMOL/L (ref 22–29)
CO2: 27 MMOL/L (ref 22–29)
COHB: 0.5 % (ref 0–1.5)
COHB: 0.6 % (ref 0–1.5)
CREAT SERPL-MCNC: 0.6 MG/DL (ref 0.7–1.2)
CREAT SERPL-MCNC: 0.6 MG/DL (ref 0.7–1.2)
CRITICAL: ABNORMAL
CRITICAL: ABNORMAL
DATE ANALYZED: ABNORMAL
DATE ANALYZED: ABNORMAL
DATE OF COLLECTION: ABNORMAL
DATE OF COLLECTION: ABNORMAL
FIO2: 40 %
GFR AFRICAN AMERICAN: >60
GFR AFRICAN AMERICAN: >60
GFR NON-AFRICAN AMERICAN: >60 ML/MIN/1.73
GFR NON-AFRICAN AMERICAN: >60 ML/MIN/1.73
GLUCOSE BLD-MCNC: 104 MG/DL (ref 74–99)
GLUCOSE BLD-MCNC: 113 MG/DL (ref 74–99)
HCO3: 25.3 MMOL/L (ref 22–26)
HCO3: 26.7 MMOL/L (ref 22–26)
HCT VFR BLD CALC: 26.3 % (ref 37–54)
HEMOGLOBIN: 8.3 G/DL (ref 12.5–16.5)
HHB: 4.6 % (ref 0–5)
HHB: 7.3 % (ref 0–5)
INR BLD: 1.9
LAB: ABNORMAL
MAGNESIUM: 2.3 MG/DL (ref 1.6–2.6)
MCH RBC QN AUTO: 30.6 PG (ref 26–35)
MCHC RBC AUTO-ENTMCNC: 31.6 % (ref 32–34.5)
MCV RBC AUTO: 97 FL (ref 80–99.9)
METHB: 0.2 % (ref 0–1.5)
METHB: 0.2 % (ref 0–1.5)
MODE: ABNORMAL
MODE: AC
O2 SATURATION: 92.6 % (ref 92–98.5)
O2 SATURATION: 95.4 % (ref 92–98.5)
O2HB: 91.9 % (ref 94–97)
O2HB: 94.7 % (ref 94–97)
OPERATOR ID: 2577
OPERATOR ID: 405
PATIENT TEMP: 37 C
PATIENT TEMP: 37 C
PCO2: 34.7 MMHG (ref 35–45)
PCO2: 39.1 MMHG (ref 35–45)
PDW BLD-RTO: 19.1 FL (ref 11.5–15)
PEEP/CPAP: 8 CMH2O
PFO2: 1.7 MMHG/%
PH BLOOD GAS: 7.45 (ref 7.35–7.45)
PH BLOOD GAS: 7.48 (ref 7.35–7.45)
PHOSPHORUS: 2 MG/DL (ref 2.5–4.5)
PLATELET # BLD: 143 E9/L (ref 130–450)
PMV BLD AUTO: 11.6 FL (ref 7–12)
PO2: 67.8 MMHG (ref 75–100)
PO2: 81.1 MMHG (ref 75–100)
POTASSIUM REFLEX MAGNESIUM: 3.6 MMOL/L (ref 3.5–5)
POTASSIUM REFLEX MAGNESIUM: 3.9 MMOL/L (ref 3.5–5)
PROTHROMBIN TIME: 21 SEC (ref 9.3–12.4)
RBC # BLD: 2.71 E12/L (ref 3.8–5.8)
RI(T): 2.4
RR MECHANICAL: 18 B/MIN
SODIUM BLD-SCNC: 145 MMOL/L (ref 132–146)
SODIUM BLD-SCNC: 146 MMOL/L (ref 132–146)
SOURCE, BLOOD GAS: ABNORMAL
SOURCE, BLOOD GAS: ABNORMAL
THB: 9.4 G/DL (ref 11.5–16.5)
THB: 9.5 G/DL (ref 11.5–16.5)
TIME ANALYZED: 1755
TIME ANALYZED: 502
TOTAL PROTEIN: 7 G/DL (ref 6.4–8.3)
TOTAL PROTEIN: 7.2 G/DL (ref 6.4–8.3)
VT MECHANICAL: 500 ML
WBC # BLD: 8.7 E9/L (ref 4.5–11.5)

## 2022-02-11 PROCEDURE — 2580000003 HC RX 258: Performed by: STUDENT IN AN ORGANIZED HEALTH CARE EDUCATION/TRAINING PROGRAM

## 2022-02-11 PROCEDURE — 82330 ASSAY OF CALCIUM: CPT

## 2022-02-11 PROCEDURE — 6360000002 HC RX W HCPCS: Performed by: INTERNAL MEDICINE

## 2022-02-11 PROCEDURE — 71045 X-RAY EXAM CHEST 1 VIEW: CPT

## 2022-02-11 PROCEDURE — 99291 CRITICAL CARE FIRST HOUR: CPT | Performed by: SURGERY

## 2022-02-11 PROCEDURE — 6370000000 HC RX 637 (ALT 250 FOR IP): Performed by: SURGERY

## 2022-02-11 PROCEDURE — 2000000000 HC ICU R&B

## 2022-02-11 PROCEDURE — 80053 COMPREHEN METABOLIC PANEL: CPT

## 2022-02-11 PROCEDURE — 83735 ASSAY OF MAGNESIUM: CPT

## 2022-02-11 PROCEDURE — 37799 UNLISTED PX VASCULAR SURGERY: CPT

## 2022-02-11 PROCEDURE — 6360000002 HC RX W HCPCS: Performed by: SURGERY

## 2022-02-11 PROCEDURE — 82140 ASSAY OF AMMONIA: CPT

## 2022-02-11 PROCEDURE — 85027 COMPLETE CBC AUTOMATED: CPT

## 2022-02-11 PROCEDURE — 2580000003 HC RX 258: Performed by: PHYSICIAN ASSISTANT

## 2022-02-11 PROCEDURE — 82805 BLOOD GASES W/O2 SATURATION: CPT

## 2022-02-11 PROCEDURE — 84100 ASSAY OF PHOSPHORUS: CPT

## 2022-02-11 PROCEDURE — 94003 VENT MGMT INPAT SUBQ DAY: CPT

## 2022-02-11 PROCEDURE — 6360000002 HC RX W HCPCS: Performed by: STUDENT IN AN ORGANIZED HEALTH CARE EDUCATION/TRAINING PROGRAM

## 2022-02-11 PROCEDURE — 6370000000 HC RX 637 (ALT 250 FOR IP): Performed by: STUDENT IN AN ORGANIZED HEALTH CARE EDUCATION/TRAINING PROGRAM

## 2022-02-11 PROCEDURE — P9047 ALBUMIN (HUMAN), 25%, 50ML: HCPCS | Performed by: SURGERY

## 2022-02-11 PROCEDURE — 36415 COLL VENOUS BLD VENIPUNCTURE: CPT

## 2022-02-11 PROCEDURE — 85610 PROTHROMBIN TIME: CPT

## 2022-02-11 PROCEDURE — C9113 INJ PANTOPRAZOLE SODIUM, VIA: HCPCS | Performed by: INTERNAL MEDICINE

## 2022-02-11 PROCEDURE — 2500000003 HC RX 250 WO HCPCS: Performed by: SURGERY

## 2022-02-11 RX ORDER — FENTANYL CITRATE 50 UG/ML
50 INJECTION, SOLUTION INTRAMUSCULAR; INTRAVENOUS
Status: COMPLETED | OUTPATIENT
Start: 2022-02-11 | End: 2022-02-11

## 2022-02-11 RX ORDER — HEPARIN SODIUM 10000 [USP'U]/ML
5000 INJECTION, SOLUTION INTRAVENOUS; SUBCUTANEOUS EVERY 8 HOURS SCHEDULED
Status: DISCONTINUED | OUTPATIENT
Start: 2022-02-11 | End: 2022-02-17

## 2022-02-11 RX ADMIN — CHLORHEXIDINE GLUCONATE 0.12% ORAL RINSE 15 ML: 1.2 LIQUID ORAL at 20:34

## 2022-02-11 RX ADMIN — MINERAL OIL AND WHITE PETROLATUM: 150; 830 OINTMENT OPHTHALMIC at 02:07

## 2022-02-11 RX ADMIN — MINERAL OIL AND WHITE PETROLATUM: 150; 830 OINTMENT OPHTHALMIC at 15:15

## 2022-02-11 RX ADMIN — ONDANSETRON 4 MG: 2 INJECTION INTRAMUSCULAR; INTRAVENOUS at 05:11

## 2022-02-11 RX ADMIN — THIAMINE HYDROCHLORIDE 100 MG: 100 INJECTION, SOLUTION INTRAMUSCULAR; INTRAVENOUS at 09:02

## 2022-02-11 RX ADMIN — FENTANYL CITRATE 25 MCG: 50 INJECTION, SOLUTION INTRAMUSCULAR; INTRAVENOUS at 11:04

## 2022-02-11 RX ADMIN — LACTULOSE 20 G: 10 SOLUTION ORAL at 20:34

## 2022-02-11 RX ADMIN — ALBUMIN (HUMAN) 25 G: 0.25 INJECTION, SOLUTION INTRAVENOUS at 08:56

## 2022-02-11 RX ADMIN — ALBUMIN (HUMAN) 25 G: 0.25 INJECTION, SOLUTION INTRAVENOUS at 18:33

## 2022-02-11 RX ADMIN — FOLIC ACID 1 MG: 5 INJECTION, SOLUTION INTRAMUSCULAR; INTRAVENOUS; SUBCUTANEOUS at 09:00

## 2022-02-11 RX ADMIN — POLYVINYL ALCOHOL 1 DROP: 14 SOLUTION/ DROPS OPHTHALMIC at 11:47

## 2022-02-11 RX ADMIN — RIFAXIMIN 550 MG: 550 TABLET ORAL at 20:34

## 2022-02-11 RX ADMIN — HEPARIN SODIUM 5000 UNITS: 10000 INJECTION INTRAVENOUS; SUBCUTANEOUS at 15:14

## 2022-02-11 RX ADMIN — FENTANYL CITRATE 25 MCG: 50 INJECTION, SOLUTION INTRAMUSCULAR; INTRAVENOUS at 04:27

## 2022-02-11 RX ADMIN — FENTANYL CITRATE 50 MCG: 50 INJECTION, SOLUTION INTRAMUSCULAR; INTRAVENOUS at 14:46

## 2022-02-11 RX ADMIN — Medication 10 ML: at 09:07

## 2022-02-11 RX ADMIN — Medication 250 MG: at 09:02

## 2022-02-11 RX ADMIN — HEPARIN SODIUM 5000 UNITS: 10000 INJECTION INTRAVENOUS; SUBCUTANEOUS at 09:02

## 2022-02-11 RX ADMIN — MULTIVITAMIN 15 ML: LIQUID ORAL at 09:20

## 2022-02-11 RX ADMIN — POLYVINYL ALCOHOL 1 DROP: 14 SOLUTION/ DROPS OPHTHALMIC at 04:30

## 2022-02-11 RX ADMIN — POTASSIUM BICARBONATE 40 MEQ: 782 TABLET, EFFERVESCENT ORAL at 09:01

## 2022-02-11 RX ADMIN — CHLORHEXIDINE GLUCONATE 0.12% ORAL RINSE 15 ML: 1.2 LIQUID ORAL at 09:07

## 2022-02-11 RX ADMIN — MINERAL OIL AND WHITE PETROLATUM: 150; 830 OINTMENT OPHTHALMIC at 06:02

## 2022-02-11 RX ADMIN — POLYVINYL ALCOHOL 1 DROP: 14 SOLUTION/ DROPS OPHTHALMIC at 09:07

## 2022-02-11 RX ADMIN — PANTOPRAZOLE SODIUM 40 MG: 40 INJECTION, POWDER, FOR SOLUTION INTRAVENOUS at 09:01

## 2022-02-11 RX ADMIN — POLYVINYL ALCOHOL 1 DROP: 14 SOLUTION/ DROPS OPHTHALMIC at 00:44

## 2022-02-11 RX ADMIN — ALBUMIN (HUMAN) 25 G: 0.25 INJECTION, SOLUTION INTRAVENOUS at 00:44

## 2022-02-11 RX ADMIN — Medication 250 MG: at 20:34

## 2022-02-11 RX ADMIN — HEPARIN SODIUM 5000 UNITS: 10000 INJECTION INTRAVENOUS; SUBCUTANEOUS at 22:01

## 2022-02-11 RX ADMIN — MINERAL OIL AND WHITE PETROLATUM: 150; 830 OINTMENT OPHTHALMIC at 11:04

## 2022-02-11 RX ADMIN — FENTANYL CITRATE 25 MCG: 50 INJECTION, SOLUTION INTRAMUSCULAR; INTRAVENOUS at 06:56

## 2022-02-11 RX ADMIN — CEFTRIAXONE 2000 MG: 2 INJECTION, POWDER, FOR SOLUTION INTRAMUSCULAR; INTRAVENOUS at 20:34

## 2022-02-11 RX ADMIN — RIFAXIMIN 550 MG: 550 TABLET ORAL at 09:03

## 2022-02-11 RX ADMIN — Medication 10 ML: at 20:34

## 2022-02-11 RX ADMIN — PANTOPRAZOLE SODIUM 40 MG: 40 INJECTION, POWDER, FOR SOLUTION INTRAVENOUS at 20:34

## 2022-02-11 RX ADMIN — LACTULOSE 20 G: 10 SOLUTION ORAL at 09:03

## 2022-02-11 ASSESSMENT — PULMONARY FUNCTION TESTS
PIF_VALUE: 19
PIF_VALUE: 7
PIF_VALUE: 7
PIF_VALUE: 19
PIF_VALUE: 19
PIF_VALUE: 7
PIF_VALUE: 7
PIF_VALUE: 40
PIF_VALUE: 19
PIF_VALUE: 50
PIF_VALUE: 19
PIF_VALUE: 18
PIF_VALUE: 34
PIF_VALUE: 19
PIF_VALUE: 40
PIF_VALUE: 18
PIF_VALUE: 7
PIF_VALUE: 36
PIF_VALUE: 19
PIF_VALUE: 41
PIF_VALUE: 33

## 2022-02-11 ASSESSMENT — PAIN SCALES - GENERAL
PAINLEVEL_OUTOF10: 7
PAINLEVEL_OUTOF10: 0

## 2022-02-11 NOTE — PROGRESS NOTES
Chief Complaint:  Hematemesis     Subjective: Intubated, awake, mildly interactive    Objective:    /71   Pulse 98   Temp 101 °F (38.3 °C) (Axillary)   Resp (!) 31   Ht 5' 8\" (1.727 m)   Wt 264 lb 8.8 oz (120 kg)   SpO2 93%   BMI 40.22 kg/m²     Current medications that patient is taking have been reviewed.     General appearance: Ill appearing man on vent  HEENT: AT/NC, ETT  Neck: FROM, supple  Lungs: Clear to auscultation anteriorly, WOB normal  CV: RRR, no MRGs  Abdomen: Soft, less distended than before, non-tender; no masses or HSM, +BS  Extremities: No peripheral edema or digital cyanosis  Skin: Somewhat jaundiced  Psych: Calm and cooperative  Neuro: Awake, lethargic, weakly moves hands bilaterally but doesn't really follow other commands    Labs:  CBC with Differential:    Lab Results   Component Value Date    WBC 8.7 02/11/2022    RBC 2.71 02/11/2022    HGB 8.3 02/11/2022    HCT 26.3 02/11/2022     02/11/2022    MCV 97.0 02/11/2022    MCH 30.6 02/11/2022    MCHC 31.6 02/11/2022    RDW 19.1 02/11/2022    LYMPHOPCT 3.2 02/07/2022    MONOPCT 8.0 02/07/2022    BASOPCT 0.1 02/07/2022    MONOSABS 0.73 02/07/2022    LYMPHSABS 0.29 02/07/2022    EOSABS 0.00 02/07/2022    BASOSABS 0.01 02/07/2022     CMP:    Lab Results   Component Value Date     02/11/2022    K 3.6 02/11/2022     02/11/2022    CO2 27 02/11/2022    BUN 16 02/11/2022    CREATININE 0.6 02/11/2022    GFRAA >60 02/11/2022    LABGLOM >60 02/11/2022    GLUCOSE 104 02/11/2022    PROT 7.2 02/11/2022    LABALBU 3.7 02/11/2022    CALCIUM 8.5 02/11/2022    BILITOT 8.6 02/11/2022    ALKPHOS 154 02/11/2022     02/11/2022     02/11/2022          Assessment/Plan:  Principal Problem:    Hematemesis  Active Problems:    Bleeding esophageal varices (HCC)    Alcohol abuse    Acute blood loss anemia    Pulmonary mass    Hyperkalemia    Morbid obesity with BMI of 40.0-44.9, adult (HCC)    Transaminitis    Shock liver  Resolved Problems:    * No resolved hospital problems. *       Severe acute liver injury.   Much better, INR trending down, LFTs trending down    Hemoglobin stabilized    Off the phenobarb for etOH withdrawal.  Continue thiamine, folic acid, multivitamin    Lactulose and rifaximin for hyperammonemia/probable HE (ammonia improving)    IV PPI    Ceftriaxone for SBP ppx    Finished octreotide    DVT prophylaxis: SCD  Requires continued inpatient level of care     Eric Leyva MD    10:31 AM  2/11/2022

## 2022-02-11 NOTE — PROGRESS NOTES
[facility]  SURGICAL INTENSIVE CARE UNIT (SICU)  ATTENDING PHYSICIAN CRITICAL CARE PROGRESS NOTE     I have examined the patient, reviewed the record, and discussed the case with the APN/ resident. Please refer to the APN/ resident's note. I agree with the assessment and plan. I have reviewed all relevant labs and imaging data. The following summarizes my clinical findings and independent assessment. CC:  Critical care management for GI bleed    Hospital Course/Overnight Events:  2/6--presented to [address] [place] [address] with hematemesis  2/7--underwent EGD with esophageal variceal banding--continued bleeding and SB tube placed; transferred here for TIPS; underwent TIPS this AM  2/8--both esophageal and gastric balloons deflated yesterday--no evidence of ongoing hemorrhage  2/9--unresponsive overnight; not following commands; sedation held this AM  2/10--intermittent agitation overnight; febrile overnight  2/11--tolerating TF    Intubated  Pupils: 2 mm and reactive bilaterally  Sclera:  Icteric  Conjunctiva: pink-red  Eyes to voice  Purposeful   Heart: Regular rate/rhythm; no murmur  Lungs: Fairly clear bilaterally  Abdomen: Distended; bowel sounds active  Skin: Warm/dry  Extremities: No edema; distal pulses readily detectable    Labs/films personally reviewed--lungs fairly clear bilaterally    Patient Active Problem List    Diagnosis Date Noted    Shock liver 02/08/2022    GI bleed 02/07/2022    Bleeding esophageal varices ([facility] [address] 02/07/2022    Alcohol abuse 02/07/2022    Acute blood loss anemia 02/07/2022    Pulmonary mass 02/07/2022    Hyperkalemia 02/07/2022    Hematemesis 02/07/2022    Morbid obesity with BMI of 40.0-44.9, adult ([facility] [address] 02/07/2022    Transaminitis 02/07/2022       GI bleed secondary to bleeding esophageal varices--status post EGD with banding but with ongoing bleeding and subsequent SB tube placement--esophageal and gastric balloon now deflated; cont PPI  Status post TIPS  EtOH abuse--cont to hold phenobarb due to change in mental status  Acute respiratory failure--continue mechanical vent support; attempt spont breathing trial  Shock liver/elevated LFTs--monitor labs  Hyperammonemia--cont lactulose  Cont rifaxamin  Rocephin for SBP prophylaxis  Altered LOC/hepatic encephalopathy--monitor neuro exam  Hypoalbuminemia/moderate protein calorie malnutrition--cont TF  Acute blood loss anemia--monitor H/H  Electrolyte imbalance (hypophosphatemia)--correct as able  DVT risk--PCDs/start subQ heparin    Discussed with Dr. Gissel Quinn at bedside    Patient is at significant risk for hemodynamic/metabolic/respiratory deterioration requires ongoing ICU care    Yakov Pandey MD, [facility]  2/11/2022  8:04 AM      Critical care time exclusive of teaching and procedures = 38 minutes       NOTE: This report was transcribed using voice recognition software. Every effort was made to ensure accuracy; however, inadvertent computerized transcription errors may be present.

## 2022-02-11 NOTE — PROGRESS NOTES
GENERAL SURGERY  DAILY PROGRESS NOTE  2/11/2022    No chief complaint on file. Subjective:  Pt intubated. More awake, but does not follow commands. Objective:  BP (!) 103/49   Pulse 114   Temp 98.6 °F (37 °C) (Axillary)   Resp 17   Ht 5' 8\" (1.727 m)   Wt 264 lb 8.8 oz (120 kg)   SpO2 96%   BMI 40.22 kg/m²     GENERAL: Intubated and sedated  HEAD: Normocephalic, atraumatic  EYES: No sclera icterus, pupils equal  LUNGS: On mechanical ventilation  CARDIOVASCULAR:  Tachycardic and normotensive  ABDOMEN:  Soft, no grimace to palpation, non-distended.  [place] in place but balloons deflated, minimal output  EXTREMITIES: No edema or swelling  SKIN: Warm and dry    Assessment/Plan:  42 y.o. male with GI hemorrhage s/p esophageal banding x 14 and blakemore tube placement (balloons deflated) s/p TIPS 2/7    - Okay for tube feeds  - CT head negative for intracranial pathology  - GI recs noted and appreciated, will defer repeat EGD to them if needed  - Monitor H/H, has been stable can check daily  - Monitor ammonia, continue lactulose  - Off PPI gtt, now on BID  - SICU care appreciated    Electronically signed by Ry Coelho MD on 2/11/2022 at 7:23 AM     As above  Await neuro improvement  TF adv  Tbil up but LFT down- post hemorrhagic?, doubt obstructive  Family updated  Discussed with SICU attending    Hilario Miller MD, MS  Minimally Invasive and Bariatric Surgery  [phone] (p)  2/11/2022  3:10 PM

## 2022-02-11 NOTE — FLOWSHEET NOTE
Pt will reach for lines, tubes, and equipment and fails to redirect to repeated verbal commands. Restraints secured for patient safety.

## 2022-02-11 NOTE — PROGRESS NOTES
Comprehensive Nutrition Assessment    Type and Reason for Visit:  Initial,Consult    Nutrition Recommendations/Plan: Continue NPO, Modify Tube Feeding     For Optimal GI tolerance given FMS w/ large output on Lactulose:  Peptide Based 1.5 (Vital AF 1.5 calorie) @ 60 ml/hr  Will provide: 1440 ml tv, 2160 kcals, 97 gm pro, 1100 ml free water  Regimen meets 94% est calorie & 100% protein needs    *Above regimen provides ~.08 gm/protein dry wt, please consult if less desired  Follow LFT's/ Ammonia trends    Nutrition Assessment:  Admit transferred from [facility] (s/p EGD/ banding x 14) for advanced GI servies 2/2 hemorrhage/ bleeding esophageal varices now s/p TIPS. Hx ETOH abuse/ Liver Cirrhosis. Pt remains intubated post-op w/ resp failure & shock liver/Hyperammonemia. EN support initiated- will provide appropriate TF rec & monitor. Malnutrition Assessment:  Malnutrition Status:   At risk for malnutrition    Context:  Chronic Illness     Findings of the 6 clinical characteristics of malnutrition:  Energy Intake:  75% or less estimated energy requirements for 1 month or longer  Weight Loss:  Unable to assess (no wt hx on file)     Body Fat Loss:  No significant body fat loss     Muscle Mass Loss:  No significant muscle mass loss    Fluid Accumulation:  No significant fluid accumulation     Strength:  Not Performed    Estimated Daily Nutrient Needs:  Energy (kcal):  PS3B 2348; 8796-6727; Weight Used for Energy Requirements:  Admission     Protein (g):  ; Weight Used for Protein Requirements:  Ideal (1.3-1.5 g/kg, can increase w/ LFT improvement)        Fluid (ml/day):  per critical care    Nutrition Related Findings:  Pt intubated, MAP WNL, +I/O's, nonpitting edema, hyperactive BS, FMS w/ large output , N/V PTA, UGIB s/p banding, Liver cirrhosis (ammonia trended down, bili 8.6), NGT w/ TF      Wounds:  None       Current Nutrition Therapies:    Current Tube Feeding (TF) Orders:  · Feeding Route: Nasogastric  · Formula: Standard without Fiber  · Schedule: Continuous (60 ml/hr, running @ 30 ml/hr advancing)  · Water Flushes: 30 ml q 3 hr    Anthropometric Measures:  · Height: 5' 8\" (172.7 cm)  · Current Body Weight: 261 lb (118.4 kg) (2/7 admit wt as CBW slightly elevated d/t fluids)   · Admission Body Weight: 261 lb (118.4 kg) (2/7 first measured)    · Usual Body Weight:  (UTO no measured wt hx on file)     · Ideal Body Weight: 154 lbs; % Ideal Body Weight 169.5 %   · BMI: 39.7 BMI Categories: Obese Class 2 (BMI 35.0 -39.9)       Nutrition Diagnosis:   · Inadequate oral intake related to impaired respiratory function as evidenced by NPO or clear liquid status due to medical condition,intubation,nutrition support - enteral nutrition    Nutrition Interventions:   Nutrition Education/Counseling:  Education not appropriate   Coordination of Nutrition Care:  Continue to monitor while inpatient    Goals:  Pt to tolerate TF at goal rate       Nutrition Monitoring and Evaluation:   Food/Nutrient Intake Outcomes:  Diet Advancement/Tolerance,Enteral Nutrition Intake/Tolerance  Physical Signs/Symptoms Outcomes:  Biochemical Data,Nutrition Focused Physical Findings,Skin,Weight,Diarrhea,GI Status,Nausea or Vomiting,Fluid Status or Edema,Hemodynamic Status     Discharge Planning:     Too soon to determine     Electronically signed by Deepti Corona RD, LD on 2/11/22 at 9:51 AM EST    Contact: Ext [phone]

## 2022-02-11 NOTE — PROGRESS NOTES
Patient extubated per verbal order with read back. Patient had positive cuff leak. Patient was thoroughly suctioned prior to extubation. Patient demonstrating difficulty in coughing secretions. Will continue to monitor the patient.

## 2022-02-11 NOTE — PROGRESS NOTES
Surgical Intensive Care Unit   Daily Progress Note     Patient's name:  Ilya Farnsworth  Age/Gender: 42 y.o. male  Date of Admission: 2/7/2022  6:01 AM  Length of Stay: 4    Reason for ICU: hemodynamic instability      Hospital Course:   2/6 Presented to [facility] with abdominal pain, nausea and hematemesis. Received 2 units of pRBCs. 2/7: Underwent EGD with 14 bands of esophageal varices. Still bleeding so Blakemore tube inserted. Plan for transfer to [facility] for TIPS procedure and advanced GI intervention. 2/8: Gastric bubble deflated on Blakemore tube. Patient agitated. Patient started on Lactulose. 2/9: Patient became increasingly more lethargic. Sedation stopped. Patient still without bowel movement. Started on Lactulose 20mg q2.  2/10: Patient started on 2.5 fentanyl prn. Agitated overnight. Continues to be febrile. Overnight Events:   2/11: Patient much more awake. He is reaching for tube. He tolerated tube feeds. HgB stable. Problem List:   Patient Active Problem List   Diagnosis    GI bleed    Bleeding esophageal varices (HCC)    Alcohol abuse    Acute blood loss anemia    Pulmonary mass    Hyperkalemia    Hematemesis    Morbid obesity with BMI of 40.0-44.9, adult (HCC)    Transaminitis    Shock liver       Vent Settings: Additional Respiratory  Assessments  Pulse: 114  Resp: 17  SpO2: 96 %  Position: Semi-Rico's  Humidification Source: Heated wire  Humidification Temp: 37  Circuit Condensation: Drained  Oral Care Completed?: Yes  Oral Care: Suction toothette,Mouthwash with chlorhexidine,Mouth suctioned,Mouth swabbed,Mouth moisturizer  Subglottic Suction Done?: No  Airway Type: ET  Airway Size: 7.5  Skin barrier applied: No  ABG:   Recent Labs     02/11/22  0502   PH 7.453*   PCO2 39.1   PO2 67.8*   HCO3 26.7*   BE 2.7   O2SAT 92.6       I/O:  I/O last 3 completed shifts:   In: 2434 [I.V.:60; NG/GT:893; IV Piggyback:1481]  Out: 1466 [Urine:2920; Emesis/NG output:200; ALCAS:9319]  No intake/output data recorded. Urethral Catheter-Output (mL): 100 mL  [REMOVED] Urethral Catheter 16 fr-Output (mL): 75 mL  NG/OG/NJ/NE Tube Nasogastric Left nostril-Output (mL): 0 ml  Stool (measured) : 0 mL    Lines:   LIJ central line (8)  R radial arterial line (27)    Tubes:   Blakemore tube 92/7)  ET (27)    Drains:   Yeung ()    Drips:   sodium chloride      dextrose         Physical Exam:   BP (!) 103/49   Pulse 114   Temp 98.6 °F (37 °C) (Axillary)   Resp 17   Ht 5' 8\" (1.727 m)   Wt 264 lb 8.8 oz (120 kg)   SpO2 96%   BMI 40.22 kg/m²     Average, Min, and Max for last 24 hours Vitals:  Temp:  Temp  Av.2 °F (37.3 °C)  Min: 98 °F (36.7 °C)  Max: 100 °F (37.8 °C)  RR: Resp  Av.7  Min: 10  Max: 31  HR: Pulse  Av.1  Min: 63  Max: 114  BP:  No data recorded.  ; No data recorded. SpO2: SpO2  Av.8 %  Min: 92 %  Max: 99 %        GCS:    6T    Pupil size:  Left 4 mm    Right 4 mm    Pupil reaction: Yes    Wiggles fingers: Left No Right No    Hand grasp:   Left none       Right none    Wiggles toes: Left No    Right No    Plantar flexion: Left none     Right none      CONSTITUTIONAL: no acute distress, intubated and sedated  NEUROLOGIC: PERRL   CARDIOVASCULAR: S1 S2, regular rate, regular rhythm, no murmur/gallop/rub  PULMONARY: no rhonchi/rales/wheezes. Pressure support   RENAL: yeung to gravity, dark urine  ABDOMEN: soft, nontender, moderately distended, nontympanic, no masses, no organomegaly, Blakemore tube in place. SKIN/EXTREMITIES: no rashes/ecchymosis, no edema/clubbing, warm/dry, good capillary refill       ASSESSMENT / PLAN:   Neuro:             - Pain/Sedation:  fentanyl prn  - Alcohol abuse: Phenobarbital held  due to altered mental status. Monitor for signs/symptoms of withdrawal  - Hyperammonemia: Lactulose and Rifaximin.      CV:  - Hemorrhagic shock- resolved.  Continue to monitor hemodynamics     Pulm:   - Acute

## 2022-02-11 NOTE — PLAN OF CARE
Problem: Non-Violent Restraints  Goal: Removal from restraints as soon as assessed to be safe  2/11/2022 0120 by Ema Chanel RN  Outcome: Not Met This Shift  2/10/2022 2159 by Ema Chanel RN  Outcome: Ongoing  2/10/2022 1809 by Gris Cary RN  Outcome: Met This Shift  Goal: No harm/injury to patient while restraints in use  2/11/2022 0120 by Ema Chanel RN  Outcome: Met This Shift  2/10/2022 2159 by Ema Chanel RN  Outcome: Met This Shift  2/10/2022 1809 by Gris Cayr RN  Outcome: Met This Shift  Goal: Patient's dignity will be maintained  2/11/2022 0120 by Ema Chanel RN  Outcome: Met This Shift  2/10/2022 2159 by Ema Chanel RN  Outcome: Met This Shift  2/10/2022 1809 by Gris Cary RN  Outcome: Met This Shift

## 2022-02-11 NOTE — FLOWSHEET NOTE
Patient is very agitated and confused. He continues to pull at ETT, [place], and other lines. After multiple attempts at reorientation, there was no evidence of learning. There is no sign of injury noted at this time. Will continue to monitor.

## 2022-02-11 NOTE — FLOWSHEET NOTE
Pt will spontaneously reach for lines, tubes and equipment and fails to redirect to repeated verbal commands.  Restraints secured for patient safety

## 2022-02-11 NOTE — PLAN OF CARE
Problem: Non-Violent Restraints  Goal: Removal from restraints as soon as assessed to be safe  2/10/2022 2159 by Lanell Epley, RN  Outcome: Ongoing  2/10/2022 1809 by Emmitt Alpers, RN  Outcome: Met This Shift  Goal: No harm/injury to patient while restraints in use  2/10/2022 2159 by Lanell Epley, RN  Outcome: Met This Shift  2/10/2022 1809 by Emmitt Alpers, RN  Outcome: Met This Shift  Goal: Patient's dignity will be maintained  2/10/2022 2159 by Lanell Epley, RN  Outcome: Met This Shift  2/10/2022 1809 by Emmitt Alpers, RN  Outcome: Met This Shift

## 2022-02-11 NOTE — CARE COORDINATION
Remains intubated on vent. Weaning as able. Agitated at times, requiring wrist restraints. Started tf's and tolerating so far. PPI cont's bid. FMS in place d/t lg output ,on lactulose. Dc plan to be determined when more medically stable.

## 2022-02-12 ENCOUNTER — APPOINTMENT (OUTPATIENT)
Dept: GENERAL RADIOLOGY | Age: 47
DRG: 405 | End: 2022-02-12
Attending: INTERNAL MEDICINE
Payer: COMMERCIAL

## 2022-02-12 LAB
ALBUMIN SERPL-MCNC: 3.9 G/DL (ref 3.5–5.2)
ALP BLD-CCNC: 141 U/L (ref 40–129)
ALT SERPL-CCNC: 290 U/L (ref 0–40)
AMMONIA: 45 UMOL/L (ref 16–60)
ANION GAP SERPL CALCULATED.3IONS-SCNC: 9 MMOL/L (ref 7–16)
AST SERPL-CCNC: 481 U/L (ref 0–39)
BILIRUB SERPL-MCNC: 11 MG/DL (ref 0–1.2)
BUN BLDV-MCNC: 12 MG/DL (ref 6–20)
CALCIUM IONIZED: 1.24 MMOL/L (ref 1.15–1.33)
CALCIUM SERPL-MCNC: 8.8 MG/DL (ref 8.6–10.2)
CHLORIDE BLD-SCNC: 109 MMOL/L (ref 98–107)
CO2: 26 MMOL/L (ref 22–29)
CREAT SERPL-MCNC: 0.5 MG/DL (ref 0.7–1.2)
CULTURE, RESPIRATORY: NORMAL
GFR AFRICAN AMERICAN: >60
GFR NON-AFRICAN AMERICAN: >60 ML/MIN/1.73
GLUCOSE BLD-MCNC: 107 MG/DL (ref 74–99)
HCT VFR BLD CALC: 26.1 % (ref 37–54)
HEMOGLOBIN: 8.2 G/DL (ref 12.5–16.5)
INR BLD: 2
MAGNESIUM: 2.1 MG/DL (ref 1.6–2.6)
MCH RBC QN AUTO: 30.9 PG (ref 26–35)
MCHC RBC AUTO-ENTMCNC: 31.4 % (ref 32–34.5)
MCV RBC AUTO: 98.5 FL (ref 80–99.9)
PDW BLD-RTO: 19.4 FL (ref 11.5–15)
PHOSPHORUS: 2.3 MG/DL (ref 2.5–4.5)
PLATELET # BLD: 136 E9/L (ref 130–450)
PMV BLD AUTO: 11.9 FL (ref 7–12)
POTASSIUM REFLEX MAGNESIUM: 3.9 MMOL/L (ref 3.5–5)
PROTHROMBIN TIME: 22.4 SEC (ref 9.3–12.4)
RBC # BLD: 2.65 E12/L (ref 3.8–5.8)
SMEAR, RESPIRATORY: NORMAL
SODIUM BLD-SCNC: 144 MMOL/L (ref 132–146)
TOTAL PROTEIN: 7.2 G/DL (ref 6.4–8.3)
WBC # BLD: 8.5 E9/L (ref 4.5–11.5)

## 2022-02-12 PROCEDURE — 2500000003 HC RX 250 WO HCPCS: Performed by: STUDENT IN AN ORGANIZED HEALTH CARE EDUCATION/TRAINING PROGRAM

## 2022-02-12 PROCEDURE — 85610 PROTHROMBIN TIME: CPT

## 2022-02-12 PROCEDURE — 84100 ASSAY OF PHOSPHORUS: CPT

## 2022-02-12 PROCEDURE — 2000000000 HC ICU R&B

## 2022-02-12 PROCEDURE — 6370000000 HC RX 637 (ALT 250 FOR IP): Performed by: SURGERY

## 2022-02-12 PROCEDURE — 6370000000 HC RX 637 (ALT 250 FOR IP): Performed by: STUDENT IN AN ORGANIZED HEALTH CARE EDUCATION/TRAINING PROGRAM

## 2022-02-12 PROCEDURE — 82140 ASSAY OF AMMONIA: CPT

## 2022-02-12 PROCEDURE — C9113 INJ PANTOPRAZOLE SODIUM, VIA: HCPCS | Performed by: INTERNAL MEDICINE

## 2022-02-12 PROCEDURE — 6360000002 HC RX W HCPCS: Performed by: SURGERY

## 2022-02-12 PROCEDURE — 2700000000 HC OXYGEN THERAPY PER DAY

## 2022-02-12 PROCEDURE — 2580000003 HC RX 258: Performed by: PHYSICIAN ASSISTANT

## 2022-02-12 PROCEDURE — 6360000002 HC RX W HCPCS: Performed by: STUDENT IN AN ORGANIZED HEALTH CARE EDUCATION/TRAINING PROGRAM

## 2022-02-12 PROCEDURE — 2580000003 HC RX 258: Performed by: STUDENT IN AN ORGANIZED HEALTH CARE EDUCATION/TRAINING PROGRAM

## 2022-02-12 PROCEDURE — 36592 COLLECT BLOOD FROM PICC: CPT

## 2022-02-12 PROCEDURE — 99291 CRITICAL CARE FIRST HOUR: CPT | Performed by: SURGERY

## 2022-02-12 PROCEDURE — 82330 ASSAY OF CALCIUM: CPT

## 2022-02-12 PROCEDURE — 6360000002 HC RX W HCPCS: Performed by: INTERNAL MEDICINE

## 2022-02-12 PROCEDURE — 80053 COMPREHEN METABOLIC PANEL: CPT

## 2022-02-12 PROCEDURE — P9047 ALBUMIN (HUMAN), 25%, 50ML: HCPCS | Performed by: SURGERY

## 2022-02-12 PROCEDURE — 2500000003 HC RX 250 WO HCPCS: Performed by: SURGERY

## 2022-02-12 PROCEDURE — 83735 ASSAY OF MAGNESIUM: CPT

## 2022-02-12 PROCEDURE — 71045 X-RAY EXAM CHEST 1 VIEW: CPT

## 2022-02-12 PROCEDURE — 36415 COLL VENOUS BLD VENIPUNCTURE: CPT

## 2022-02-12 PROCEDURE — 85027 COMPLETE CBC AUTOMATED: CPT

## 2022-02-12 RX ORDER — LANOLIN ALCOHOL/MO/W.PET/CERES
6 CREAM (GRAM) TOPICAL NIGHTLY
Status: DISCONTINUED | OUTPATIENT
Start: 2022-02-12 | End: 2022-02-22 | Stop reason: HOSPADM

## 2022-02-12 RX ADMIN — FOLIC ACID 1 MG: 5 INJECTION, SOLUTION INTRAMUSCULAR; INTRAVENOUS; SUBCUTANEOUS at 08:47

## 2022-02-12 RX ADMIN — Medication 250 MG: at 09:40

## 2022-02-12 RX ADMIN — MULTIVITAMIN 15 ML: LIQUID ORAL at 08:55

## 2022-02-12 RX ADMIN — HEPARIN SODIUM 5000 UNITS: 10000 INJECTION INTRAVENOUS; SUBCUTANEOUS at 13:50

## 2022-02-12 RX ADMIN — HEPARIN SODIUM 5000 UNITS: 10000 INJECTION INTRAVENOUS; SUBCUTANEOUS at 19:56

## 2022-02-12 RX ADMIN — SODIUM CHLORIDE, PRESERVATIVE FREE 10 ML: 5 INJECTION INTRAVENOUS at 13:50

## 2022-02-12 RX ADMIN — Medication 10 ML: at 19:58

## 2022-02-12 RX ADMIN — PANTOPRAZOLE SODIUM 40 MG: 40 INJECTION, POWDER, FOR SOLUTION INTRAVENOUS at 08:46

## 2022-02-12 RX ADMIN — Medication 6 MG: at 19:58

## 2022-02-12 RX ADMIN — RIFAXIMIN 550 MG: 550 TABLET ORAL at 08:55

## 2022-02-12 RX ADMIN — FENTANYL CITRATE 25 MCG: 50 INJECTION, SOLUTION INTRAMUSCULAR; INTRAVENOUS at 21:53

## 2022-02-12 RX ADMIN — CHLORHEXIDINE GLUCONATE 0.12% ORAL RINSE 15 ML: 1.2 LIQUID ORAL at 08:45

## 2022-02-12 RX ADMIN — RIFAXIMIN 550 MG: 550 TABLET ORAL at 19:58

## 2022-02-12 RX ADMIN — FENTANYL CITRATE 25 MCG: 50 INJECTION, SOLUTION INTRAMUSCULAR; INTRAVENOUS at 16:59

## 2022-02-12 RX ADMIN — PANTOPRAZOLE SODIUM 40 MG: 40 INJECTION, POWDER, FOR SOLUTION INTRAVENOUS at 19:57

## 2022-02-12 RX ADMIN — LACTULOSE 20 G: 10 SOLUTION ORAL at 08:47

## 2022-02-12 RX ADMIN — ALBUMIN (HUMAN) 25 G: 0.25 INJECTION, SOLUTION INTRAVENOUS at 01:10

## 2022-02-12 RX ADMIN — Medication 10 ML: at 08:46

## 2022-02-12 RX ADMIN — CEFTRIAXONE 2000 MG: 2 INJECTION, POWDER, FOR SOLUTION INTRAMUSCULAR; INTRAVENOUS at 20:05

## 2022-02-12 RX ADMIN — SODIUM CHLORIDE, PRESERVATIVE FREE 10 ML: 5 INJECTION INTRAVENOUS at 16:59

## 2022-02-12 RX ADMIN — THIAMINE HYDROCHLORIDE 100 MG: 100 INJECTION, SOLUTION INTRAMUSCULAR; INTRAVENOUS at 08:46

## 2022-02-12 RX ADMIN — LACTULOSE 20 G: 10 SOLUTION ORAL at 13:50

## 2022-02-12 RX ADMIN — LACTULOSE 20 G: 10 SOLUTION ORAL at 19:58

## 2022-02-12 RX ADMIN — CHLORHEXIDINE GLUCONATE 0.12% ORAL RINSE 15 ML: 1.2 LIQUID ORAL at 19:56

## 2022-02-12 RX ADMIN — FENTANYL CITRATE 25 MCG: 50 INJECTION, SOLUTION INTRAMUSCULAR; INTRAVENOUS at 19:57

## 2022-02-12 RX ADMIN — FENTANYL CITRATE 25 MCG: 50 INJECTION, SOLUTION INTRAMUSCULAR; INTRAVENOUS at 13:53

## 2022-02-12 RX ADMIN — Medication 250 MG: at 16:54

## 2022-02-12 RX ADMIN — HEPARIN SODIUM 5000 UNITS: 10000 INJECTION INTRAVENOUS; SUBCUTANEOUS at 06:08

## 2022-02-12 RX ADMIN — SODIUM PHOSPHATE, MONOBASIC, MONOHYDRATE AND SODIUM PHOSPHATE, DIBASIC, ANHYDROUS 30 MMOL: 276; 142 INJECTION, SOLUTION INTRAVENOUS at 09:40

## 2022-02-12 RX ADMIN — Medication 250 MG: at 13:49

## 2022-02-12 RX ADMIN — Medication 250 MG: at 19:57

## 2022-02-12 ASSESSMENT — PAIN SCALES - GENERAL
PAINLEVEL_OUTOF10: 4
PAINLEVEL_OUTOF10: 8
PAINLEVEL_OUTOF10: 6
PAINLEVEL_OUTOF10: 8
PAINLEVEL_OUTOF10: 0
PAINLEVEL_OUTOF10: 6
PAINLEVEL_OUTOF10: 0
PAINLEVEL_OUTOF10: 8
PAINLEVEL_OUTOF10: 0
PAINLEVEL_OUTOF10: 8

## 2022-02-12 ASSESSMENT — PAIN DESCRIPTION - FREQUENCY: FREQUENCY: INTERMITTENT

## 2022-02-12 ASSESSMENT — PULMONARY FUNCTION TESTS
PIF_VALUE: 7

## 2022-02-12 ASSESSMENT — PAIN DESCRIPTION - PROGRESSION: CLINICAL_PROGRESSION: GRADUALLY WORSENING

## 2022-02-12 ASSESSMENT — PAIN DESCRIPTION - ORIENTATION: ORIENTATION: LOWER

## 2022-02-12 ASSESSMENT — PAIN DESCRIPTION - DESCRIPTORS: DESCRIPTORS: ACHING;DISCOMFORT;SORE

## 2022-02-12 ASSESSMENT — PAIN DESCRIPTION - PAIN TYPE: TYPE: ACUTE PAIN

## 2022-02-12 ASSESSMENT — PAIN DESCRIPTION - ONSET: ONSET: AWAKENED FROM SLEEP

## 2022-02-12 ASSESSMENT — PAIN DESCRIPTION - LOCATION: LOCATION: ABDOMEN

## 2022-02-12 ASSESSMENT — PAIN - FUNCTIONAL ASSESSMENT: PAIN_FUNCTIONAL_ASSESSMENT: PREVENTS OR INTERFERES SOME ACTIVE ACTIVITIES AND ADLS

## 2022-02-12 NOTE — PLAN OF CARE
Problem: Non-Violent Restraints  Goal: Removal from restraints as soon as assessed to be safe  2/12/2022 0044 by Monse Cole RN  Outcome: Not Met This Shift     Problem: Non-Violent Restraints  Goal: No harm/injury to patient while restraints in use  2/12/2022 0044 by Monse Cole RN  Outcome: Met This Shift     Problem: Non-Violent Restraints  Goal: Patient's dignity will be maintained  2/12/2022 0044 by Monse Cole RN  Outcome: Met This Shift

## 2022-02-12 NOTE — FLOWSHEET NOTE
Patient continues to have agitation/restlessness with attempts at reaching for lines/tubes. No evidence of learning at this time, bilateral soft wrist restraints continued for safety and line/tube protection. Will continue to monitor.

## 2022-02-12 NOTE — PLAN OF CARE
Problem: Falls - Risk of:  Goal: Will remain free from falls  Description: Will remain free from falls  2/12/2022 1633 by Tian Escobedo RN  Outcome: Met This Shift  2/12/2022 0953 by Tian Escobedo RN  Outcome: Met This Shift     Problem: Falls - Risk of:  Goal: Absence of physical injury  Description: Absence of physical injury  2/12/2022 1633 by Tian Escobedo RN  Outcome: Met This Shift  2/12/2022 0953 by Tian Escobedo RN  Outcome: Met This Shift     Problem: Non-Violent Restraints  Goal: No harm/injury to patient while restraints in use  2/12/2022 1633 by Tian Escobedo RN  Outcome: Met This Shift  2/12/2022 0953 by Tian Escobedo RN  Outcome: Met This Shift     Problem: Aspiration:  Goal: Absence of aspiration  Description: Absence of aspiration  2/12/2022 1633 by Tian Escobedo RN  Outcome: Met This Shift  2/12/2022 0953 by Tian Escobedo RN  Outcome: Met This Shift     Problem: Non-Violent Restraints  Goal: Removal from restraints as soon as assessed to be safe  2/12/2022 1633 by Tian Escobedo RN  Outcome: Not Met This Shift  2/12/2022 0953 by Tian Escobedo RN  Outcome: Not Met This Shift     Problem: Airway Clearance - Ineffective:  Goal: Ability to maintain a clear airway will improve  Description: Ability to maintain a clear airway will improve  2/12/2022 1633 by Tian Escobedo RN  Outcome: Not Met This Shift  2/12/2022 0953 by Tian Escobedo RN  Outcome: Not Met This Shift     Problem: Anxiety/Stress:  Goal: Level of anxiety will decrease  Description: Level of anxiety will decrease  2/12/2022 1633 by Tian Escobedo RN  Outcome: Not Met This Shift  2/12/2022 0953 by Tian Escobedo RN  Outcome: Not Met This Shift     Problem:  Bowel Function - Altered:  Goal: Bowel elimination is within specified parameters  Description: Bowel elimination is within specified parameters  2/12/2022 1633 by Tian Escobedo RN  Outcome: Not Met This Shift  2/12/2022 0953 by Tian Escobedo RN  Outcome: Not Met This Shift     Problem: Gas Exchange - Impaired:  Goal: Levels of oxygenation will improve  Description: Levels of oxygenation will improve  2/12/2022 1633 by Kim Cervantes RN  Outcome: Not Met This Shift  2/12/2022 0953 by Kim Cervantes RN  Outcome: Not Met This Shift     Problem: Mental Status - Impaired:  Goal: Mental status will be restored to baseline  Description: Mental status will be restored to baseline  2/12/2022 1633 by Kim Cervantes RN  Outcome: Not Met This Shift  2/12/2022 0953 by Kim Cervantes RN  Outcome: Not Met This Shift     Problem: Nutrition Deficit:  Goal: Ability to achieve adequate nutritional intake will improve  Description: Ability to achieve adequate nutritional intake will improve  2/12/2022 1633 by Kim Cervantes RN  Outcome: Not Met This Shift  2/12/2022 0953 by Kim Cervantes RN  Outcome: Not Met This Shift     Problem: Pain:  Goal: Control of acute pain  Description: Control of acute pain  2/12/2022 1633 by Kim Cervantes RN  Outcome: Not Met This Shift  2/12/2022 0953 by Kim Cervantes RN  Outcome: Met This Shift     Problem: Sleep Pattern Disturbance:  Goal: Appears well-rested  Description: Appears well-rested  2/12/2022 1633 by Kim Cervantes RN  Outcome: Not Met This Shift  2/12/2022 0953 by Kim Cervantes RN  Outcome: Not Met This Shift

## 2022-02-12 NOTE — PLAN OF CARE
Problem: Falls - Risk of:  Goal: Will remain free from falls  Description: Will remain free from falls  2/12/2022 0953 by Génesis Byers RN  Outcome: Met This Shift  2/11/2022 2128 by Tiffanie Garnica RN  Outcome: Met This Shift     Problem: Falls - Risk of:  Goal: Absence of physical injury  Description: Absence of physical injury  2/12/2022 0953 by Génesis Byers RN  Outcome: Met This Shift  2/11/2022 2128 by Tiffanie Garnica RN  Outcome: Met This Shift     Problem: Non-Violent Restraints  Goal: No harm/injury to patient while restraints in use  2/12/2022 0953 by Génesis Byers RN  Outcome: Met This Shift  2/12/2022 0044 by Tiffanie Garnica RN  Outcome: Met This Shift  2/11/2022 2128 by Tiffanie Garnica RN  Outcome: Met This Shift     Problem: Aspiration:  Goal: Absence of aspiration  Description: Absence of aspiration  2/12/2022 0953 by Génesis Byers RN  Outcome: Met This Shift  2/11/2022 2128 by Tiffanie Garnica RN  Outcome: Met This Shift     Problem: Pain:  Goal: Control of acute pain  Description: Control of acute pain  2/12/2022 0953 by Génesis Byers RN  Outcome: Met This Shift  2/11/2022 2128 by Tiffanie Garnica RN  Outcome: Met This Shift     Problem: Non-Violent Restraints  Goal: Removal from restraints as soon as assessed to be safe  2/12/2022 0953 by Génesis Byers RN  Outcome: Not Met This Shift  2/12/2022 0044 by Tiffanie Garnica RN  Outcome: Not Met This Shift  2/11/2022 2128 by Tiffanie Garnica RN  Outcome: Not Met This Shift     Problem: Airway Clearance - Ineffective:  Goal: Ability to maintain a clear airway will improve  Description: Ability to maintain a clear airway will improve  2/12/2022 0953 by Génesis Byers RN  Outcome: Not Met This Shift  2/11/2022 2128 by Tiffanie Garnica RN  Outcome: Met This Shift     Problem: Anxiety/Stress:  Goal: Level of anxiety will decrease  Description: Level of anxiety will decrease  2/12/2022 0953 by Génesis Byers RN  Outcome: Not Met This Shift  2/11/2022 2128 by Tiffanie Garnica RN  Outcome: Met This Shift     Problem:  Bowel Function - Altered:  Goal: Bowel elimination is within specified parameters  Description: Bowel elimination is within specified parameters  2/12/2022 0953 by Shirin Bingham RN  Outcome: Not Met This Shift  2/11/2022 2128 by Rebecca Padilla RN  Outcome: Met This Shift     Problem: Gas Exchange - Impaired:  Goal: Levels of oxygenation will improve  Description: Levels of oxygenation will improve  2/12/2022 0953 by Shirin Bingham RN  Outcome: Not Met This Shift  2/11/2022 2128 by Rebecca Padilla RN  Outcome: Met This Shift     Problem: Mental Status - Impaired:  Goal: Mental status will be restored to baseline  Description: Mental status will be restored to baseline  2/12/2022 0953 by Shirin Bingham RN  Outcome: Not Met This Shift  2/11/2022 2128 by Rebecca Padilla RN  Outcome: Met This Shift     Problem: Nutrition Deficit:  Goal: Ability to achieve adequate nutritional intake will improve  Description: Ability to achieve adequate nutritional intake will improve  2/12/2022 0953 by Shirin Bingham RN  Outcome: Not Met This Shift  2/11/2022 2128 by Rebecca Padilla RN  Outcome: Met This Shift     Problem: Sleep Pattern Disturbance:  Goal: Appears well-rested  Description: Appears well-rested  2/12/2022 0953 by Shirin Bingham RN  Outcome: Not Met This Shift  2/11/2022 2128 by Rebecca Padilla RN  Outcome: Met This Shift

## 2022-02-12 NOTE — PROGRESS NOTES
[facility]  SURGICAL INTENSIVE CARE UNIT (SICU)  ATTENDING PHYSICIAN CRITICAL CARE PROGRESS NOTE     I have examined the patient, reviewed the record, and discussed the case with the APN/ resident. Please refer to the APN/ resident's note. I agree with the assessment and plan. I have reviewed all relevant labs and imaging data. The following summarizes my clinical findings and independent assessment. CC:  Critical care management for GI bleed    Hospital Course/Overnight Events:  2/6--presented to [facility] with hematemesis  2/7--underwent EGD with esophageal variceal banding--continued bleeding and SB tube placed; transferred here for TIPS; underwent TIPS this AM  2/8--both esophageal and gastric balloons deflated yesterday--no evidence of ongoing hemorrhage  2/9--unresponsive overnight; not following commands; sedation held this AM  2/10--intermittent agitation overnight; febrile overnight  2/11--tolerating TF  2/12--extubated yesterday    Pt denies pain. Denies shortness of breath. Awake and alert  Oriented to person/place/time, but confused to events  Follows commands  Pupils: 2 mm and reactive bilaterally  Sclera:  Icteric  Conjunctiva: pink-red  Heart: Regular rate/rhythm; no murmur  Lungs: Fairly clear bilaterally  Abdomen: Distended; bowel sounds active  Skin: Warm/dry  Extremities: No edema; distal pulses readily detectable    Labs personally reviewed    Patient Active Problem List    Diagnosis Date Noted    Shock liver 02/08/2022    GI bleed 02/07/2022    Bleeding esophageal varices ([facility] [address] 02/07/2022    Alcohol abuse 02/07/2022    Acute blood loss anemia 02/07/2022    Pulmonary mass 02/07/2022    Hyperkalemia 02/07/2022    Hematemesis 02/07/2022    Morbid obesity with BMI of 40.0-44.9, adult ([address] 02/07/2022    Transaminitis 02/07/2022       GI bleed secondary to bleeding esophageal varices--status post EGD with banding but with ongoing bleeding and subsequent SB tube placement--esophageal and gastric balloon now deflated; cont PPI  Status post TIPS  EtOH abuse--monitor for withdrawal symptoms  Acute respiratory insuff--at risk for respiratory failure/re-intubation--monitor resp status; wean O2 as able  Elevated LFTs/hyperbilirubinemia--monitor labs  Hyperammonemia--resolved with lactulose  Cont rifaxamin  Rocephin for SBP prophylaxis  Altered LOC/hepatic encephalopathy--monitor neuro exam  Hypoalbuminemia/moderate protein calorie malnutrition--cont TF  Acute blood loss anemia--monitor H/H  Electrolyte imbalance (hypophosphatemia)--correct as able  DVT risk--PCDs/subQ heparin    Patient is at significant risk for hemodynamic/metabolic/respiratory deterioration requires ongoing ICU care    Spence Councilman, MD, [facility]  2/12/2022  9:49 AM      Critical care time exclusive of teaching and procedures = 37 minutes       NOTE: This report was transcribed using voice recognition software. Every effort was made to ensure accuracy; however, inadvertent computerized transcription errors may be present.

## 2022-02-12 NOTE — FLOWSHEET NOTE
Pt now extubated and on a venturi mask attempting to pull at mask and and lines that pertain to critical care. Multiple attempts to redirect patient were unsuccessful. Will continue use of bilateral soft wrist restraints and will continue to assess and monitor.

## 2022-02-12 NOTE — FLOWSHEET NOTE
Patient agitated, attempts to reach for lines/tubes. No evidence of learning at this time, bilateral soft wrist restraints continued for safety and line/tube protection. Will continue to monitor.

## 2022-02-12 NOTE — FLOWSHEET NOTE
Pt continues to be agitated and attempting to reach and pull at oxygen tubing, cords, and IV lines. Multiple attempts to redirect patient were unsuccessful. Will continue use of bilateral soft wrist restraints. Will continue to assess and monitor.

## 2022-02-12 NOTE — PROGRESS NOTES
Subjective:  Patient was seen in the surgical ICU  Admission details noted  No overnight adverse events  Data reviewed in detail  44-year-old man with hepatic encephalopathy  Currently on facemask  NG tube intact  Tolerating tube feedings  [place] catheter intact  Hallucinating this morning      Objective:    BP (!) 145/87   Pulse 99   Temp 99.8 °F (37.7 °C) (Axillary)   Resp 15   Ht 5' 8\" (1.727 m)   Wt 263 lb 14.3 oz (119.7 kg)   SpO2 91%   BMI 40.12 kg/m²   Disoriented awake alert  Follows commands  Face mask with oxygen on  Oxygenating well  NG tube intact  Oral mucosa moist  Neck supple without adenopathy  Heart:  RRR, no murmurs, gallops, or rubs.   Lungs:  CTA bilaterally, no wheeze, rales or rhonchi  Abd: bowel sounds present, nontender, somewhat distended  Extrem:  No clubbing, cyanosis, or edema  Disoriented hallucinating with a flapping tremor    Data reviewed    Assessment:  Hepatic encephalopathy  Patient Active Problem List   Diagnosis    GI bleed    Bleeding esophageal varices (HCC)    Alcohol abuse    Acute blood loss anemia    Pulmonary mass    Hyperkalemia    Hematemesis    Morbid obesity with BMI of 40.0-44.9, adult (HCC)    Transaminitis    Shock liver       Plan:  Continue lactulose rifaximin  Rocephin for spontaneous bacterial peritonitis  Advance diet as tolerated  Monitor labs closely  May transfer to myrna Garcia MD  7:35 AM  2/12/2022

## 2022-02-12 NOTE — PLAN OF CARE
Problem: Skin Integrity:  Goal: Will show no infection signs and symptoms  Description: Will show no infection signs and symptoms  Outcome: Met This Shift  Goal: Absence of new skin breakdown  Description: Absence of new skin breakdown  Outcome: Met This Shift     Problem: Falls - Risk of:  Goal: Will remain free from falls  Description: Will remain free from falls  Outcome: Met This Shift  Goal: Absence of physical injury  Description: Absence of physical injury  Outcome: Met This Shift     Problem: Non-Violent Restraints  Goal: Removal from restraints as soon as assessed to be safe  Outcome: Not Met This Shift  Goal: No harm/injury to patient while restraints in use  Outcome: Met This Shift  Goal: Patient's dignity will be maintained  Outcome: Met This Shift     Problem: Discharge Planning:  Goal: Participates in care planning  Description: Participates in care planning  Outcome: Met This Shift  Goal: Discharged to appropriate level of care  Description: Discharged to appropriate level of care  Outcome: Met This Shift     Problem: Airway Clearance - Ineffective:  Goal: Ability to maintain a clear airway will improve  Description: Ability to maintain a clear airway will improve  Outcome: Met This Shift     Problem: Anxiety/Stress:  Goal: Level of anxiety will decrease  Description: Level of anxiety will decrease  Outcome: Met This Shift     Problem: Aspiration:  Goal: Absence of aspiration  Description: Absence of aspiration  Outcome: Met This Shift     Problem:  Bowel Function - Altered:  Goal: Bowel elimination is within specified parameters  Description: Bowel elimination is within specified parameters  Outcome: Met This Shift     Problem: Cardiac Output - Decreased:  Goal: Hemodynamic stability will improve  Description: Hemodynamic stability will improve  Outcome: Met This Shift     Problem: Fluid Volume - Imbalance:  Goal: Absence of imbalanced fluid volume signs and symptoms  Description: Absence of imbalanced fluid volume signs and symptoms  Outcome: Met This Shift     Problem: Gas Exchange - Impaired:  Goal: Levels of oxygenation will improve  Description: Levels of oxygenation will improve  Outcome: Met This Shift     Problem: Mental Status - Impaired:  Goal: Mental status will be restored to baseline  Description: Mental status will be restored to baseline  Outcome: Met This Shift     Problem: Nutrition Deficit:  Goal: Ability to achieve adequate nutritional intake will improve  Description: Ability to achieve adequate nutritional intake will improve  Outcome: Met This Shift     Problem: Pain:  Goal: Pain level will decrease  Description: Pain level will decrease  Outcome: Met This Shift  Goal: Recognizes and communicates pain  Description: Recognizes and communicates pain  Outcome: Met This Shift  Goal: Control of acute pain  Description: Control of acute pain  Outcome: Met This Shift  Goal: Control of chronic pain  Description: Control of chronic pain  Outcome: Met This Shift     Problem: Serum Glucose Level - Abnormal:  Goal: Ability to maintain appropriate glucose levels will improve to within specified parameters  Description: Ability to maintain appropriate glucose levels will improve to within specified parameters  Outcome: Met This Shift     Problem: Skin Integrity - Impaired:  Goal: Will show no infection signs and symptoms  Description: Will show no infection signs and symptoms  Outcome: Met This Shift  Goal: Absence of new skin breakdown  Description: Absence of new skin breakdown  Outcome: Met This Shift     Problem: Sleep Pattern Disturbance:  Goal: Appears well-rested  Description: Appears well-rested  Outcome: Met This Shift     Problem: Tissue Perfusion, Altered:  Goal: Circulatory function within specified parameters  Description: Circulatory function within specified parameters  Outcome: Met This Shift     Problem: Tissue Perfusion - Cardiopulmonary, Altered:  Goal: Absence of angina  Description: Absence of angina  Outcome: Met This Shift  Goal: Hemodynamic stability will improve  Description: Hemodynamic stability will improve  Outcome: Met This Shift

## 2022-02-13 LAB
ALBUMIN SERPL-MCNC: 3.2 G/DL (ref 3.5–5.2)
ALP BLD-CCNC: 139 U/L (ref 40–129)
ALT SERPL-CCNC: 216 U/L (ref 0–40)
AMMONIA: 57 UMOL/L (ref 16–60)
ANION GAP SERPL CALCULATED.3IONS-SCNC: 10 MMOL/L (ref 7–16)
AST SERPL-CCNC: 303 U/L (ref 0–39)
BILIRUB SERPL-MCNC: 10.8 MG/DL (ref 0–1.2)
BUN BLDV-MCNC: 14 MG/DL (ref 6–20)
CALCIUM IONIZED: 1.2 MMOL/L (ref 1.15–1.33)
CALCIUM SERPL-MCNC: 8.1 MG/DL (ref 8.6–10.2)
CHLORIDE BLD-SCNC: 113 MMOL/L (ref 98–107)
CO2: 26 MMOL/L (ref 22–29)
CREAT SERPL-MCNC: 0.6 MG/DL (ref 0.7–1.2)
GFR AFRICAN AMERICAN: >60
GFR NON-AFRICAN AMERICAN: >60 ML/MIN/1.73
GLUCOSE BLD-MCNC: 116 MG/DL (ref 74–99)
HCT VFR BLD CALC: 25.9 % (ref 37–54)
HEMOGLOBIN: 8.1 G/DL (ref 12.5–16.5)
INR BLD: 2
MAGNESIUM: 2.2 MG/DL (ref 1.6–2.6)
MCH RBC QN AUTO: 30.9 PG (ref 26–35)
MCHC RBC AUTO-ENTMCNC: 31.3 % (ref 32–34.5)
MCV RBC AUTO: 98.9 FL (ref 80–99.9)
PDW BLD-RTO: 20.1 FL (ref 11.5–15)
PHOSPHORUS: 1.8 MG/DL (ref 2.5–4.5)
PLATELET # BLD: 142 E9/L (ref 130–450)
PMV BLD AUTO: 12.1 FL (ref 7–12)
POTASSIUM REFLEX MAGNESIUM: 4 MMOL/L (ref 3.5–5)
PROTHROMBIN TIME: 22.2 SEC (ref 9.3–12.4)
RBC # BLD: 2.62 E12/L (ref 3.8–5.8)
SODIUM BLD-SCNC: 149 MMOL/L (ref 132–146)
TOTAL PROTEIN: 6.7 G/DL (ref 6.4–8.3)
WBC # BLD: 8.3 E9/L (ref 4.5–11.5)

## 2022-02-13 PROCEDURE — 6360000002 HC RX W HCPCS: Performed by: SURGERY

## 2022-02-13 PROCEDURE — 6370000000 HC RX 637 (ALT 250 FOR IP): Performed by: SURGERY

## 2022-02-13 PROCEDURE — 2500000003 HC RX 250 WO HCPCS: Performed by: SURGERY

## 2022-02-13 PROCEDURE — 2580000003 HC RX 258: Performed by: PHYSICIAN ASSISTANT

## 2022-02-13 PROCEDURE — 83735 ASSAY OF MAGNESIUM: CPT

## 2022-02-13 PROCEDURE — 85610 PROTHROMBIN TIME: CPT

## 2022-02-13 PROCEDURE — 2000000000 HC ICU R&B

## 2022-02-13 PROCEDURE — 99291 CRITICAL CARE FIRST HOUR: CPT | Performed by: SURGERY

## 2022-02-13 PROCEDURE — 6360000002 HC RX W HCPCS: Performed by: INTERNAL MEDICINE

## 2022-02-13 PROCEDURE — 82140 ASSAY OF AMMONIA: CPT

## 2022-02-13 PROCEDURE — 6360000002 HC RX W HCPCS: Performed by: STUDENT IN AN ORGANIZED HEALTH CARE EDUCATION/TRAINING PROGRAM

## 2022-02-13 PROCEDURE — 36592 COLLECT BLOOD FROM PICC: CPT

## 2022-02-13 PROCEDURE — 82330 ASSAY OF CALCIUM: CPT

## 2022-02-13 PROCEDURE — 6370000000 HC RX 637 (ALT 250 FOR IP): Performed by: STUDENT IN AN ORGANIZED HEALTH CARE EDUCATION/TRAINING PROGRAM

## 2022-02-13 PROCEDURE — 2580000003 HC RX 258: Performed by: STUDENT IN AN ORGANIZED HEALTH CARE EDUCATION/TRAINING PROGRAM

## 2022-02-13 PROCEDURE — 36415 COLL VENOUS BLD VENIPUNCTURE: CPT

## 2022-02-13 PROCEDURE — 85027 COMPLETE CBC AUTOMATED: CPT

## 2022-02-13 PROCEDURE — 2700000000 HC OXYGEN THERAPY PER DAY

## 2022-02-13 PROCEDURE — 84100 ASSAY OF PHOSPHORUS: CPT

## 2022-02-13 PROCEDURE — C9113 INJ PANTOPRAZOLE SODIUM, VIA: HCPCS | Performed by: INTERNAL MEDICINE

## 2022-02-13 PROCEDURE — 80053 COMPREHEN METABOLIC PANEL: CPT

## 2022-02-13 RX ORDER — QUETIAPINE FUMARATE 25 MG/1
25 TABLET, FILM COATED ORAL DAILY
Status: DISCONTINUED | OUTPATIENT
Start: 2022-02-13 | End: 2022-02-15

## 2022-02-13 RX ADMIN — HEPARIN SODIUM 5000 UNITS: 10000 INJECTION INTRAVENOUS; SUBCUTANEOUS at 15:07

## 2022-02-13 RX ADMIN — Medication 500 MG: at 08:25

## 2022-02-13 RX ADMIN — LACTULOSE 20 G: 10 SOLUTION ORAL at 12:30

## 2022-02-13 RX ADMIN — THIAMINE HYDROCHLORIDE 100 MG: 100 INJECTION, SOLUTION INTRAMUSCULAR; INTRAVENOUS at 08:50

## 2022-02-13 RX ADMIN — PANTOPRAZOLE SODIUM 40 MG: 40 INJECTION, POWDER, FOR SOLUTION INTRAVENOUS at 08:26

## 2022-02-13 RX ADMIN — LACTULOSE 20 G: 10 SOLUTION ORAL at 21:03

## 2022-02-13 RX ADMIN — SODIUM CHLORIDE, PRESERVATIVE FREE 10 ML: 5 INJECTION INTRAVENOUS at 08:24

## 2022-02-13 RX ADMIN — RIFAXIMIN 550 MG: 550 TABLET ORAL at 21:03

## 2022-02-13 RX ADMIN — CHLORHEXIDINE GLUCONATE 0.12% ORAL RINSE 15 ML: 1.2 LIQUID ORAL at 21:03

## 2022-02-13 RX ADMIN — Medication 6 MG: at 21:05

## 2022-02-13 RX ADMIN — ONDANSETRON 4 MG: 2 INJECTION INTRAMUSCULAR; INTRAVENOUS at 08:24

## 2022-02-13 RX ADMIN — FENTANYL CITRATE 25 MCG: 50 INJECTION, SOLUTION INTRAMUSCULAR; INTRAVENOUS at 03:09

## 2022-02-13 RX ADMIN — MULTIVITAMIN 15 ML: LIQUID ORAL at 08:26

## 2022-02-13 RX ADMIN — CHLORHEXIDINE GLUCONATE 0.12% ORAL RINSE 15 ML: 1.2 LIQUID ORAL at 08:25

## 2022-02-13 RX ADMIN — HEPARIN SODIUM 5000 UNITS: 10000 INJECTION INTRAVENOUS; SUBCUTANEOUS at 04:19

## 2022-02-13 RX ADMIN — Medication 500 MG: at 12:06

## 2022-02-13 RX ADMIN — Medication 500 MG: at 18:00

## 2022-02-13 RX ADMIN — Medication 10 ML: at 21:04

## 2022-02-13 RX ADMIN — RIFAXIMIN 550 MG: 550 TABLET ORAL at 08:30

## 2022-02-13 RX ADMIN — QUETIAPINE FUMARATE 25 MG: 25 TABLET ORAL at 08:50

## 2022-02-13 RX ADMIN — PANTOPRAZOLE SODIUM 40 MG: 40 INJECTION, POWDER, FOR SOLUTION INTRAVENOUS at 21:03

## 2022-02-13 RX ADMIN — LACTULOSE 20 G: 10 SOLUTION ORAL at 08:25

## 2022-02-13 RX ADMIN — FOLIC ACID 1 MG: 5 INJECTION, SOLUTION INTRAMUSCULAR; INTRAVENOUS; SUBCUTANEOUS at 08:31

## 2022-02-13 RX ADMIN — Medication 500 MG: at 21:04

## 2022-02-13 RX ADMIN — CEFTRIAXONE 2000 MG: 2 INJECTION, POWDER, FOR SOLUTION INTRAMUSCULAR; INTRAVENOUS at 21:05

## 2022-02-13 RX ADMIN — FENTANYL CITRATE 25 MCG: 50 INJECTION, SOLUTION INTRAMUSCULAR; INTRAVENOUS at 21:03

## 2022-02-13 RX ADMIN — HEPARIN SODIUM 5000 UNITS: 10000 INJECTION INTRAVENOUS; SUBCUTANEOUS at 21:03

## 2022-02-13 RX ADMIN — ONDANSETRON 4 MG: 2 INJECTION INTRAMUSCULAR; INTRAVENOUS at 01:10

## 2022-02-13 RX ADMIN — Medication 10 ML: at 08:26

## 2022-02-13 ASSESSMENT — PAIN SCALES - GENERAL
PAINLEVEL_OUTOF10: 0
PAINLEVEL_OUTOF10: 4
PAINLEVEL_OUTOF10: 4

## 2022-02-13 NOTE — PROGRESS NOTES
Subjective:  2/12/2022  Patient was seen in the surgical ICU  Admission details noted  No overnight adverse events  Data reviewed in detail  57-year-old man with hepatic encephalopathy  Currently on facemask  NG tube intact  Tolerating tube feedings  [place] catheter intact  Hallucinating this morning  2/13/2022  Patient was seen in surgical ICU this morning  Data reviewed in detail  Spoke with general surgery resident  Tolerating tube feedings  Mental status much better  He is much more oriented and has no hallucinations    Objective:    /74   Pulse 111   Temp 99.1 °F (37.3 °C) (Oral)   Resp 24   Ht 5' 8\" (1.727 m)   Wt 248 lb 10.9 oz (112.8 kg)   SpO2 95%   BMI 37.81 kg/m²   Disoriented awake alert  Follows commands  Face mask with oxygen on  Oxygenating well  NG tube intact  Oral mucosa moist  Neck supple without adenopathy  Heart:  RRR, no murmurs, gallops, or rubs.   Lungs:  CTA bilaterally, no wheeze, rales or rhonchi  Abd: bowel sounds present, nontender, somewhat distended  Extrem:  No clubbing, cyanosis, or edema  Disoriented  with a flapping tremor    Data reviewed    Assessment:  Hepatic encephalopathy  Patient Active Problem List   Diagnosis    GI bleed    Bleeding esophageal varices (HCC)    Alcohol abuse    Acute blood loss anemia    Pulmonary mass    Hyperkalemia    Hematemesis    Morbid obesity with BMI of 40.0-44.9, adult (HCC)    Transaminitis    Shock liver       Plan:  Continue lactulose rifaximin  Rocephin for spontaneous bacterial peritonitis  Advance diet as tolerated  Monitor labs closely  May transfer to stepdown        Sumit Beach MD  8:05 AM  2/13/2022

## 2022-02-13 NOTE — PLAN OF CARE
Problem: Non-Violent Restraints  Goal: Removal from restraints as soon as assessed to be safe  2/13/2022 1006 by Jono Polanco RN  Outcome: Not Met This Shift  2/13/2022 0617 by Jose Zimmerman RN  Outcome: Met This Shift  Goal: No harm/injury to patient while restraints in use  2/13/2022 1006 by Jono Polanco RN  Outcome: Met This Shift  2/13/2022 0617 by Jose Zimmerman RN  Outcome: Met This Shift  Goal: Patient's dignity will be maintained  2/13/2022 1006 by Jono Polanco RN  Outcome: Met This Shift  2/13/2022 0617 by Jose Zimmerman RN  Outcome: Met This Shift

## 2022-02-13 NOTE — PLAN OF CARE
Problem: Non-Violent Restraints  Goal: Removal from restraints as soon as assessed to be safe  2/13/2022 1715 by Alfonso Cabello RN  Outcome: Not Met This Shift  2/13/2022 1006 by Alfonso Cabello RN  Outcome: Not Met This Shift  2/13/2022 0617 by Cecil Mcelroy RN  Outcome: Met This Shift  Goal: No harm/injury to patient while restraints in use  2/13/2022 1715 by Alfonso Cabello RN  Outcome: Met This Shift  2/13/2022 1006 by Alfonso Cabello RN  Outcome: Met This Shift  2/13/2022 0617 by Cecil Mcelroy RN  Outcome: Met This Shift  Goal: Patient's dignity will be maintained  2/13/2022 1715 by Alfonso Cabello RN  Outcome: Met This Shift  2/13/2022 1006 by Alfonso Cabello RN  Outcome: Met This Shift  2/13/2022 0617 by Cecil Mcelroy RN  Outcome: Met This Shift

## 2022-02-13 NOTE — FLOWSHEET NOTE
Patient will reach at lines and tubes needing to be redirected often. Attempting to provide calm environment and reorientation, but patient still assessed to be a risk of harm to self. Will continue to monitor patient and assess for earliest removal capability.

## 2022-02-13 NOTE — FLOWSHEET NOTE
Patient will reach at lines and tubes needing to be redirected much of time and is at risk of pulling out necessary NGT, yeung, FMS and central lines. Attempting to provide calm environment and reorientation, but patient still assessed to be a risk of harm to self. Family aware for need of 2 point soft wrist restraints. Will continue to monitor patient and assess for earliest removal capability.

## 2022-02-13 NOTE — PROGRESS NOTES
[facility]  SURGICAL INTENSIVE CARE UNIT (SICU)  ATTENDING PHYSICIAN CRITICAL CARE PROGRESS NOTE     I have examined the patient, reviewed the record, and discussed the case with the APN/ resident. Please refer to the APN/ resident's note. I agree with the assessment and plan. I have reviewed all relevant labs and imaging data. The following summarizes my clinical findings and independent assessment. CC:  Critical care management for GI bleed    Hospital Course/Overnight Events:  2/6--presented to [facility] with hematemesis  2/7--underwent EGD with esophageal variceal banding--continued bleeding and SB tube placed; transferred here for TIPS; underwent TIPS this AM  2/8--both esophageal and gastric balloons deflated yesterday--no evidence of ongoing hemorrhage  2/9--unresponsive overnight; not following commands; sedation held this AM  2/10--intermittent agitation overnight; febrile overnight  2/11--tolerating TF  2/12--extubated yesterday  2/13--intermittent agitation    Pt restless. Denies abd pain or shortness of breath. Awake and alert  Oriented to person/place/time, but confused to events  Follows commands  Pupils: 2 mm and reactive bilaterally  Sclera:  Icteric  Conjunctiva: pink-red  Heart: Regular rate/rhythm; no murmur  Lungs: Fairly clear bilaterally  Abdomen: Distended; bowel sounds active  Skin: Warm/dry  Extremities: No edema; distal pulses readily detectable    Labs personally reviewed    Patient Active Problem List    Diagnosis Date Noted    Shock liver 02/08/2022    GI bleed 02/07/2022    Bleeding esophageal varices ([facility] [address] 02/07/2022    Alcohol abuse 02/07/2022    Acute blood loss anemia 02/07/2022    Pulmonary mass 02/07/2022    Hyperkalemia 02/07/2022    Hematemesis 02/07/2022    Morbid obesity with BMI of 40.0-44.9, adult ([address] 02/07/2022    Transaminitis 02/07/2022       GI bleed secondary to bleeding esophageal varices--status post EGD with banding but with ongoing bleeding and subsequent SB tube placement--esophageal and gastric balloon now deflated; cont PPI  Status post TIPS  EtOH abuse--monitor for withdrawal symptoms  Acute respiratory insuff--at risk for respiratory failure/re-intubation--monitor resp status; wean O2 as able  Elevated LFTs/hyperbilirubinemia--monitor labs  Hyperammonemia--resolved with lactulose  Cont rifaxamin  Rocephin for SBP prophylaxis  Altered LOC/hepatic encephalopathy--monitor neuro exam; add seroquel  Hypoalbuminemia/moderate protein calorie malnutrition--cont TF  Acute blood loss anemia--monitor H/H  Electrolyte imbalance (hypernatremia/hypophosphatemia)--correct as able  DVT risk--PCDs/subQ heparin    Patient is at significant risk for hemodynamic/metabolic/respiratory deterioration requires ongoing ICU care    Lucrecia Gunter MD, [facility]  2/13/2022  8:23 AM      Critical care time exclusive of teaching and procedures = 36 minutes       NOTE: This report was transcribed using voice recognition software. Every effort was made to ensure accuracy; however, inadvertent computerized transcription errors may be present.

## 2022-02-14 ENCOUNTER — APPOINTMENT (OUTPATIENT)
Dept: GENERAL RADIOLOGY | Age: 47
DRG: 405 | End: 2022-02-14
Attending: INTERNAL MEDICINE
Payer: COMMERCIAL

## 2022-02-14 PROBLEM — I50.9 ACUTE CHF (CONGESTIVE HEART FAILURE) (HCC): Status: ACTIVE | Noted: 2022-02-14

## 2022-02-14 PROBLEM — I50.9 ACUTE CHF (CONGESTIVE HEART FAILURE) (HCC): Chronic | Status: ACTIVE | Noted: 2022-02-14

## 2022-02-14 LAB
ALBUMIN SERPL-MCNC: 3 G/DL (ref 3.5–5.2)
ALP BLD-CCNC: 120 U/L (ref 40–129)
ALT SERPL-CCNC: 166 U/L (ref 0–40)
AMMONIA: 47 UMOL/L (ref 16–60)
ANION GAP SERPL CALCULATED.3IONS-SCNC: 9 MMOL/L (ref 7–16)
AST SERPL-CCNC: 218 U/L (ref 0–39)
BILIRUB SERPL-MCNC: 12.8 MG/DL (ref 0–1.2)
BUN BLDV-MCNC: 12 MG/DL (ref 6–20)
CALCIUM IONIZED: 1.13 MMOL/L (ref 1.15–1.33)
CALCIUM SERPL-MCNC: 7.6 MG/DL (ref 8.6–10.2)
CHLORIDE BLD-SCNC: 102 MMOL/L (ref 98–107)
CO2: 25 MMOL/L (ref 22–29)
CREAT SERPL-MCNC: 0.6 MG/DL (ref 0.7–1.2)
GFR AFRICAN AMERICAN: >60
GFR NON-AFRICAN AMERICAN: >60 ML/MIN/1.73
GLUCOSE BLD-MCNC: 73 MG/DL (ref 74–99)
HCT VFR BLD CALC: 24 % (ref 37–54)
HEMOGLOBIN: 7.7 G/DL (ref 12.5–16.5)
INR BLD: 2.1
MAGNESIUM: 1.9 MG/DL (ref 1.6–2.6)
MCH RBC QN AUTO: 31 PG (ref 26–35)
MCHC RBC AUTO-ENTMCNC: 32.1 % (ref 32–34.5)
MCV RBC AUTO: 96.8 FL (ref 80–99.9)
PDW BLD-RTO: 19.6 FL (ref 11.5–15)
PHOSPHORUS: 2.7 MG/DL (ref 2.5–4.5)
PLATELET # BLD: 140 E9/L (ref 130–450)
PMV BLD AUTO: 12.4 FL (ref 7–12)
POTASSIUM REFLEX MAGNESIUM: 3.9 MMOL/L (ref 3.5–5)
PROTHROMBIN TIME: 23.8 SEC (ref 9.3–12.4)
RBC # BLD: 2.48 E12/L (ref 3.8–5.8)
SODIUM BLD-SCNC: 136 MMOL/L (ref 132–146)
TOTAL PROTEIN: 6.3 G/DL (ref 6.4–8.3)
WBC # BLD: 7 E9/L (ref 4.5–11.5)

## 2022-02-14 PROCEDURE — 2700000000 HC OXYGEN THERAPY PER DAY

## 2022-02-14 PROCEDURE — 6370000000 HC RX 637 (ALT 250 FOR IP): Performed by: SURGERY

## 2022-02-14 PROCEDURE — 2500000003 HC RX 250 WO HCPCS: Performed by: SURGERY

## 2022-02-14 PROCEDURE — 82140 ASSAY OF AMMONIA: CPT

## 2022-02-14 PROCEDURE — 82330 ASSAY OF CALCIUM: CPT

## 2022-02-14 PROCEDURE — 99291 CRITICAL CARE FIRST HOUR: CPT | Performed by: SURGERY

## 2022-02-14 PROCEDURE — 94664 DEMO&/EVAL PT USE INHALER: CPT

## 2022-02-14 PROCEDURE — 97162 PT EVAL MOD COMPLEX 30 MIN: CPT

## 2022-02-14 PROCEDURE — 6360000002 HC RX W HCPCS: Performed by: INTERNAL MEDICINE

## 2022-02-14 PROCEDURE — 6360000002 HC RX W HCPCS: Performed by: STUDENT IN AN ORGANIZED HEALTH CARE EDUCATION/TRAINING PROGRAM

## 2022-02-14 PROCEDURE — 97166 OT EVAL MOD COMPLEX 45 MIN: CPT

## 2022-02-14 PROCEDURE — 85027 COMPLETE CBC AUTOMATED: CPT

## 2022-02-14 PROCEDURE — 2580000003 HC RX 258: Performed by: STUDENT IN AN ORGANIZED HEALTH CARE EDUCATION/TRAINING PROGRAM

## 2022-02-14 PROCEDURE — 97530 THERAPEUTIC ACTIVITIES: CPT

## 2022-02-14 PROCEDURE — 2580000003 HC RX 258: Performed by: PHYSICIAN ASSISTANT

## 2022-02-14 PROCEDURE — 6370000000 HC RX 637 (ALT 250 FOR IP): Performed by: STUDENT IN AN ORGANIZED HEALTH CARE EDUCATION/TRAINING PROGRAM

## 2022-02-14 PROCEDURE — 99232 SBSQ HOSP IP/OBS MODERATE 35: CPT | Performed by: STUDENT IN AN ORGANIZED HEALTH CARE EDUCATION/TRAINING PROGRAM

## 2022-02-14 PROCEDURE — 71045 X-RAY EXAM CHEST 1 VIEW: CPT

## 2022-02-14 PROCEDURE — 2000000000 HC ICU R&B

## 2022-02-14 PROCEDURE — 94640 AIRWAY INHALATION TREATMENT: CPT

## 2022-02-14 PROCEDURE — 80053 COMPREHEN METABOLIC PANEL: CPT

## 2022-02-14 PROCEDURE — 84100 ASSAY OF PHOSPHORUS: CPT

## 2022-02-14 PROCEDURE — 6360000002 HC RX W HCPCS: Performed by: SURGERY

## 2022-02-14 PROCEDURE — 36415 COLL VENOUS BLD VENIPUNCTURE: CPT

## 2022-02-14 PROCEDURE — 97535 SELF CARE MNGMENT TRAINING: CPT

## 2022-02-14 PROCEDURE — 83735 ASSAY OF MAGNESIUM: CPT

## 2022-02-14 PROCEDURE — 85610 PROTHROMBIN TIME: CPT

## 2022-02-14 PROCEDURE — 36592 COLLECT BLOOD FROM PICC: CPT

## 2022-02-14 PROCEDURE — C9113 INJ PANTOPRAZOLE SODIUM, VIA: HCPCS | Performed by: INTERNAL MEDICINE

## 2022-02-14 RX ORDER — IPRATROPIUM BROMIDE AND ALBUTEROL SULFATE 2.5; .5 MG/3ML; MG/3ML
1 SOLUTION RESPIRATORY (INHALATION)
Status: DISCONTINUED | OUTPATIENT
Start: 2022-02-14 | End: 2022-02-22 | Stop reason: HOSPADM

## 2022-02-14 RX ORDER — FOLIC ACID 1 MG/1
1 TABLET ORAL DAILY
Status: DISCONTINUED | OUTPATIENT
Start: 2022-02-15 | End: 2022-02-22 | Stop reason: HOSPADM

## 2022-02-14 RX ORDER — FUROSEMIDE 10 MG/ML
20 INJECTION INTRAMUSCULAR; INTRAVENOUS ONCE
Status: COMPLETED | OUTPATIENT
Start: 2022-02-14 | End: 2022-02-14

## 2022-02-14 RX ORDER — GAUZE BANDAGE 2" X 2"
100 BANDAGE TOPICAL DAILY
Status: DISCONTINUED | OUTPATIENT
Start: 2022-02-15 | End: 2022-02-22 | Stop reason: HOSPADM

## 2022-02-14 RX ADMIN — IPRATROPIUM BROMIDE AND ALBUTEROL SULFATE 1 AMPULE: .5; 2.5 SOLUTION RESPIRATORY (INHALATION) at 17:07

## 2022-02-14 RX ADMIN — MULTIVITAMIN 15 ML: LIQUID ORAL at 09:24

## 2022-02-14 RX ADMIN — Medication 10 ML: at 20:25

## 2022-02-14 RX ADMIN — Medication 2 PACKET: at 13:30

## 2022-02-14 RX ADMIN — RIFAXIMIN 550 MG: 550 TABLET ORAL at 20:28

## 2022-02-14 RX ADMIN — LACTULOSE 20 G: 10 SOLUTION ORAL at 08:37

## 2022-02-14 RX ADMIN — Medication 2 PACKET: at 16:37

## 2022-02-14 RX ADMIN — HEPARIN SODIUM 5000 UNITS: 10000 INJECTION INTRAVENOUS; SUBCUTANEOUS at 04:10

## 2022-02-14 RX ADMIN — Medication 500 MG: at 20:25

## 2022-02-14 RX ADMIN — LACTULOSE 20 G: 10 SOLUTION ORAL at 20:26

## 2022-02-14 RX ADMIN — CALCIUM GLUCONATE 2000 MG: 98 INJECTION, SOLUTION INTRAVENOUS at 08:37

## 2022-02-14 RX ADMIN — Medication 6 MG: at 20:25

## 2022-02-14 RX ADMIN — LACTULOSE 20 G: 10 SOLUTION ORAL at 14:45

## 2022-02-14 RX ADMIN — PANTOPRAZOLE SODIUM 40 MG: 40 INJECTION, POWDER, FOR SOLUTION INTRAVENOUS at 20:26

## 2022-02-14 RX ADMIN — Medication 2 PACKET: at 08:36

## 2022-02-14 RX ADMIN — Medication 10 ML: at 08:37

## 2022-02-14 RX ADMIN — FOLIC ACID 1 MG: 5 INJECTION, SOLUTION INTRAMUSCULAR; INTRAVENOUS; SUBCUTANEOUS at 08:38

## 2022-02-14 RX ADMIN — RIFAXIMIN 550 MG: 550 TABLET ORAL at 09:24

## 2022-02-14 RX ADMIN — CHLORHEXIDINE GLUCONATE 0.12% ORAL RINSE 15 ML: 1.2 LIQUID ORAL at 20:26

## 2022-02-14 RX ADMIN — FUROSEMIDE 20 MG: 20 INJECTION, SOLUTION INTRAMUSCULAR; INTRAVENOUS at 12:49

## 2022-02-14 RX ADMIN — HEPARIN SODIUM 5000 UNITS: 10000 INJECTION INTRAVENOUS; SUBCUTANEOUS at 20:28

## 2022-02-14 RX ADMIN — CHLORHEXIDINE GLUCONATE 0.12% ORAL RINSE 15 ML: 1.2 LIQUID ORAL at 08:37

## 2022-02-14 RX ADMIN — PANTOPRAZOLE SODIUM 40 MG: 40 INJECTION, POWDER, FOR SOLUTION INTRAVENOUS at 08:36

## 2022-02-14 RX ADMIN — HEPARIN SODIUM 5000 UNITS: 10000 INJECTION INTRAVENOUS; SUBCUTANEOUS at 14:15

## 2022-02-14 RX ADMIN — THIAMINE HYDROCHLORIDE 100 MG: 100 INJECTION, SOLUTION INTRAMUSCULAR; INTRAVENOUS at 08:37

## 2022-02-14 RX ADMIN — IPRATROPIUM BROMIDE AND ALBUTEROL SULFATE 1 AMPULE: .5; 2.5 SOLUTION RESPIRATORY (INHALATION) at 12:22

## 2022-02-14 RX ADMIN — IPRATROPIUM BROMIDE AND ALBUTEROL SULFATE 1 AMPULE: .5; 2.5 SOLUTION RESPIRATORY (INHALATION) at 20:51

## 2022-02-14 RX ADMIN — QUETIAPINE FUMARATE 25 MG: 25 TABLET ORAL at 08:37

## 2022-02-14 ASSESSMENT — PAIN SCALES - GENERAL
PAINLEVEL_OUTOF10: 0

## 2022-02-14 NOTE — CARE COORDINATION
Pt was extubated on 2/11. o2 13L high flow. Pt currently asleep, but per reports, pt is more lucid. Skin jaundiced. Tolerating po diet. Ng tube and yeung removed. Met with pt's mom at bedside. He lives with his wife and kids in a 2 story home with 3 areli, b&b on 2nd floor. Pt is a . Discussed AR vs [facility] with mom. Therapy recommending acute rehab. Although,unclear if pt has an acute rehab diagnosis. Mom feels pt would not want [facility]. Will get pm&r consult. Pt's dad has been to [facility] in the past. Mom feels he may prefer [facility], but will check with pt when awake. Referral made to [facility] at [facility]. If able to go home with [facility], wife works, but mom and other family members will be available to assist pt at home.

## 2022-02-14 NOTE — PROGRESS NOTES
[address]       AQFE:                                                               Patient Name: Gadiel Marquez  MRN: [id number]  : 1975  Room: [address] [place] [address]    Evaluating OT: Emil Del Real, OTR/L [id number]    Referring Provider: ALEJANDRA Alfaro   Specific Provider Orders/Date: OT eval and treat (22)       Diagnosis: GI BLEED                          Bleeding esophageal varices    Surgery/Procedures:        EGD ESOPHAGOGASTRODUODENOSCOPY ESOPHAGEAL BANDING       Pertinent Medical History:  alcohol abuse;,newly diagnosed Cirrhosis      *Precautions:  Fall Risk, NGT, O2    Assessment of current deficits   [x] Functional mobility  [x]ADLs  [x] Strength               [x]Cognition   [x] Functional transfers   [x] IADLs         [x] Safety Awareness   [x]Endurance   [x] Fine Coordination        [x] ROM     [] Vision/perception   []Sensation    [x]Gross Motor Coordination [x] Balance   [] Delirium                  []Motor Control     [] Communication    OT PLAN OF CARE   OT POC based on physician orders, patient diagnosis and results of clinical assessment.        Frequency/Duration: 1-3 days/wk for 1-2 weeks PRN    Specific OT Treatment to include:   ADL retraining/adapted techniques and AE recommendations to increase functional independence within precautions                    Energy conservation techniques to improve tolerance for selfcare routine   Functional transfer/mobility training/DME recommendations for increased independence, safety and fall prevention         Patient/family education to increase safety and functional independence within precautions              Environmental modifications for safe mobility and completion of ADLs                           Cognitive retraining ex's to improve problem solving skills & safe participation in ADLs/IADLs  Sensory re-education techniques to improve extremity awareness, maintain skin integrity and improve hand function                             Visual/Perceptual retraining  to improve body awareness and safety during transfers and ADLs  Therapeutic activity to improve functional performance during ADLs/IADLs                                         Therapeutic exercise to improve tolerance and functional strength for ADLs   Balance retraining exercises/tasks for facilitation of postural control with dynamic challenges during ADLs . Positioning to improve functional independence  Neuromuscular re-education: facilitation of righting/equilibrium reactions, normalization muscle tone/facilitation active functional movement                      Delirium prevention/treatment      Recommended Adaptive Equipment: TBA: ADL AE pending progress, shower seat, commode rails vs raised seat, AD as indicated     Home Living: Pt lives with wife and children  in a 2 floor plan with bed/bath on 2nd floor: flight with partial rail. Bathroom setup: tub/shower 2nd floor; half bath on first floor  Equipment owned: no DME    Prior Level of Function: IND with ADLs;  IND with IADLs. No device for ambulation. Driving: yes  Occupation:     Pain Level: pt c/o 0/10  pain  this session    Cognition: A&O: 3-4/4    Follows 1-2 step commands with min cues; min redirection for impulsivity and safety during ADLs and transfers.     Memory: fair 3/3 word recall with cues   Comprehension fair   Problem solving: fair- (new learning- dressing AE)   Judgement/safety: fair-                Communication skills: WFL           Vision: WFL; denies vision changes               Glasses:yes                                                   Hearing: min [place] vs inattention      RASS: 0  CAM-ICU: (NT) Delirium    UE Assessment:  Hand Dominance: Right [x]  Left []     ROM Strength STM goal: PRN   RUE  WFL; impaired FMC 4/5 proximal  3+/5 distal  WFL for ADLS     LUE WFL; impaired FMC 4/5 proximal  3+/5 distal WFL for ADLS       Sensation: No c/o numbness/tingling in  extremities. Tone: WNL   Edema: min edema B UE/hands; B LE (feet)     Functional Assessment:  AM-PAC Daily Activity Raw Score: 12/24   Initial Eval Status  Date: 2/14 Treatment Status  Date: STGs = LTGs  Time frame: 7-14 days   Feeding Min A  (unsteady hands) to hold onto cup                        Valeri  while seated up in chair to increase activity tolerance        Grooming Mod A  (decreased safety regarding medical lines/tubes)                        SBA   while standing sink level requiring AD as needed for balance and demonstrating G tolerance      UB dressing/bathing Mod A                        Min A       LB dressing/bathing Dep     Max A overall  using AE after instruction (seated)    Socks- Mod A   (reacher; sock aid)                        Min A   using AE as needed for safe reach/ energy conservation       Toileting catheter                        Min A     Bed Mobility  Supine to sit:   Mod A+2    Sit to supine:   NT                        SBA  in prep of ADL tasks & transfers   Functional Transfers Sit to stand: Mod A    Stand to sit:   Mod A    SPT: Mod A                        S  sit<>stand/functional bathroom transfers using AD/DME as needed for balance and safety   Functional Mobility Mod A no device  (short/shuffled steps)  Min A [employer]                       S   functional/bathroom mobility using AD as needed & demonstrating G safety     Balance Sitting:     Static: Min A to SBA    Dynamic:Min A  Standing: Mod A  S dynamic sitting balance; S dynamic standing balance  during ADL tasks & transfers   Endurance/Activity Tolerance   F tolerance with light activity.    G   tolerance with moderate activity/self care routine   Visual/  Perceptual Min cues for environmental awareness; safety                     Vitals:   HR at rest: 90 bpm HR at end of session: 101 bpm   Spo2 at rest:97% Spo2 at end of session 94%   BP at rest:152/86 mmHg BP at end of session 105/64 mmHg       Treatment: OT treatment provided this date includes:  Bed mobility: Instruction on precautions prior to bed mobility to facilitate safe transfers and ADLS. Pt required 2 person assist for safe mobility due to complexity of medical condition, medical lines and deconditioning. HOB elevated to assist.     Balance retraining: Performed sitting/standing balance ex's with instruction to facilitate righting reactions with postural changes during ADLS. Pt provided with walker for stability. ADL retraining: Instruction on adapted techniques/AE (reacher, sock aid) to increase independence and safe reach during dressing/bathing activities. Pt demonstrated fair- follow through. Pt can benefit from further education and strengthening for effective use of tools. Functional transfer training:  Instruction on postural cues, hand placement/sequencing, & walker safety  to assist with balance and fall prevention during self care routine/bathroom transfers. Delirium Prevention: Environmental and sensory modifications assessed and implemented to decrease ICU acquired delirium and to improve overall orientation, mentation and pt interaction with family/staff. Line management and environmental modifications made prior to and end of session to ensure patient safety and to increase efficiency of session. Skilled monitoring of HR, O2 saturation, blood pressure and patient's response to activity performed throughout session. Comments: OK from RN to see patient. Upon arrival, patient supine in bed, agreeable to session. Pt demo fair tolerance with fair understanding of education/techniques. At end of session, patient left seated in chair; family member present. Nurse aware. Call light within reach, all lines and tubes intact. Pt instructed on use of call light for assistance and fall prevention.  .    Patient presents with decreased ROM/strength,activity tolerance, dynamic balance, functional mobility and cognitive deficits limiting completion of ADLs and safety. Pt can benefit from continued skilled OT to increase safety, functional independence and quality of life. Rehab Potential: good for established goals    Patient / Family Goal: to return to PLOF    Patient and/or family were instructed/educated on diagnosis, prognosis/goals and plan of care. Pt demonstrated fair understanding. Evaluation Complexity: Moderate     · History: Expanded chart review of consults, imaging, and psychosocial history related to current functional performance. · Exam: 5+ performance deficits identified limiting functional independence and safe return home   · Assistance/Modification: Min/mod assistance or modifications required to perform tasks. May have comorbidities that affect occupational performance. [] Malnutrition indicators have been identified and nursing has been notified to ensure a dietitian consult is ordered. Time In:0950             Time Out: 1030         Total Treatment time: 25   Min Units   OT Eval Low 19016     OT Eval Medium 80398 X    OT Eval High 17413     OT Re-Eval [id number]     Therapeutic Ex 29761     Therapeutic Activities 90926 15 1   ADL/Self Care 79165 10 1   Orthotic Management 02627     Neuro Re-Ed 27083     Non-Billable Time        Evaluation time includes thorough review of current medical information, gathering information on past medical history/social history and prior level of function, completion of standardized testing/informal observation of tasks, assessment of data and development of POC/Goals.      Benoit Mitchell, OTR/L [id number]

## 2022-02-14 NOTE — PROGRESS NOTES
Surgical Intensive Care Unit   Daily Progress Note     Patient's name:  Anton Dee  Age/Gender: 42 y.o. male  Date of Admission: 2/7/2022  6:01 AM  Length of Stay: 7    Reason for ICU: hemodynamic instability         Hospital Course:   2/6 Presented to [facility] with abdominal pain, nausea and hematemesis. Received 2 units of pRBCs. 2/7: Underwent EGD with 14 bands of esophageal varices. Still bleeding so Blakemore tube inserted. Plan for transfer to [facility] for TIPS procedure and advanced GI intervention. 2/8: Gastric bubble deflated on Blakemore tube. Patient agitated. Patient started on Lactulose. 2/9: Patient became increasingly more lethargic. Sedation stopped. Patient still without bowel movement. Started on Lactulose 20mg q2.  2/10: Patient started on 2.5 fentanyl prn. Agitated overnight. Continues to be febrile. 2/11--tolerating TF (extubated)  2/12--extubated yesterday  2/13--intermittent agitation  2/14: cxr and breathing tx, ezpap due to oxygebn requirements    Overnight Events:   2/14: no acute events      Problem List:   Patient Active Problem List   Diagnosis    GI bleed    Bleeding esophageal varices (HCC)    Alcohol abuse    Acute blood loss anemia    Pulmonary mass    Hyperkalemia    Hematemesis    Morbid obesity with BMI of 40.0-44.9, adult ([address]    Transaminitis    Shock liver       Vent Settings: Additional Respiratory  Assessments  Pulse: 97  Resp: 20  SpO2: 97 %  Position: Semi-Rico's  Humidification Source: Heated wire  Humidification Temp: 37  Circuit Condensation: Drained  Oral Care Completed?: Yes  Oral Care: Lip moisturizer applied,Mouth swabbed,Mouth moisturizer,Mouth suctioned  Subglottic Suction Done?: No  Airway Type: ET  Airway Size: 7.5  Skin barrier applied: No  ABG:   Recent Labs     02/11/22  1755   PH 7.481*   PCO2 34.7*   PO2 81.1   HCO3 25.3   BE 1.9   O2SAT 95.4       I/O:  I/O last 3 completed shifts:   In: 0204 [P.O.:4455; NG/GT:1491]  Out: 2731 [Urine:2330; Stool:2175]  I/O this shift:  In: 1060 [P.O.:960; IV Piggyback:100]  Out: 135 [Urine:135]  [REMOVED] Urethral Catheter-Output (mL): 60 mL  [REMOVED] Urethral Catheter 16 fr-Output (mL): 75 mL  [REMOVED] NG/OG/NJ/NE Tube Nasogastric Left nostril-Output (mL): 0 ml  Stool (measured) : 0 mL    Lines:   LIJ central line ()  R radial arterial line ()    Tubes:   Blakemore tube ()  ET ()    Drains:   Yeung ()    Drips:   sodium chloride      dextrose         Physical Exam:   /78   Pulse 97   Temp 99.5 °F (37.5 °C) (Oral)   Resp 20   Ht 5' 8\" (1.727 m)   Wt 257 lb 8 oz (116.8 kg)   SpO2 97%   BMI 39.15 kg/m²     Average, Min, and Max for last 24 hours Vitals:  Temp:  Temp  Av °F (37.2 °C)  Min: 98.5 °F (36.9 °C)  Max: 99.5 °F (37.5 °C)  RR: Resp  Av.2  Min: 14  Max: 28  HR: Pulse  Av.7  Min: 85  Max: 635  BP:  Systolic (21DIY), UAX:404 , Min:95 , ZKH:255   ; Diastolic (02PGF), YLK:60, Min:62, Max:96    SpO2: SpO2  Av.1 %  Min: 91 %  Max: 97 %        GCS:    6T    Pupil size:  Left 4 mm    Right 4 mm    Pupil reaction: Yes    Wiggles fingers: Left No Right No    Hand grasp:   Left none       Right none    Wiggles toes: Left No    Right No    Plantar flexion: Left none     Right none      CONSTITUTIONAL: no acute distress, alert and oriented x 3  NEUROLOGIC: PERRL   CARDIOVASCULAR: S1 S2, regular rate, regular rhythm, no murmur/gallop/rub  PULMONARY: no rhonchi/rales/wheezes. Pressure support   RENAL: yeung to gravity, dark urine  ABDOMEN: soft, nontender, moderately distended, nontympanic, no masses, no organomegaly,   SKIN/EXTREMITIES: no rashes/ecchymosis, no edema/clubbing, warm/dry, good capillary refill       ASSESSMENT / PLAN:   Neuro:             - Pain/Sedation:  fentanyl prn  - Alcohol abuse: Phenobarbital held  due to altered mental status.  Monitor for signs/symptoms of withdrawal  - Hyperammonemia: Lactulose and Rifaximin. -AMS/hepatic encephalopathy- improving, seroquel 25 mg nightly     CV:  - Hemorrhagic shock- resolved. Continue to monitor hemodynamics     Pulm:   - Acute respiratory failure: extubated 2/11 wean oxygen as tolerated   - cxr, ez pap, duoneb, lasix for high oxygen demands    Renal:  - hyperkalemia: resolved. Continue to monitor daily CMP. - hypocalcemia: replace as needed  - hypomagnesemia: replace as needed  Hypophosphatemia: continue to monitor and replace as needed  - Lactic acidosis: resolved. Continue to monitor     GI:                    - Diet:  general   - variceal hemorrhage: s/p IR TIPS procedure 2/7., GI following, Monitor HgB, continue PPI  - elevated transaminases: NAC completed 2/9  - SBP profalaxis: completed rocephin  - ETOH abuse - continue to monitor, thiamine, multivitamin     Heme:  - acute blood loss anemia: resolved Monitor HgB      ID:  - No acute issues     Endo:   - No acute issues. Keep glucose < 180.     MSK:  - No acute issues.  WBAT to all extremities     Bowel reg:                none  DVT ppx:                   SCDs, heparin  GI ppx:                      PPI  Seizure proph:         none  Mouth/eye care:       Lacrilube, Peridex  Escudero:                        For critical care monitoring of fluid balance  Central lines:            LIJ central line  Family Update:         As available   CODE Status:           FULL      Dispo:                       SICU    Electronically signed by Pema Gonzalez MD on 2/14/2022 at 12:45 PM

## 2022-02-14 NOTE — PROGRESS NOTES
Patient having multiple instances of urinary incontinence this shift related to urgency. Patient educated about the importance of obtaining accurate output volumes and provided with bedside urinal. Patient refusing external catheter stating \"I'm not putting anything else inside me\" Will continue to attempt to collect and document accurate urinary output.

## 2022-02-14 NOTE — PROGRESS NOTES
Chief Complaint:  Hematemesis     Subjective:    Denies abd pain or dyspnea, up and working with PT    Objective:    /74   Pulse 87   Temp 99.9 °F (37.7 °C) (Oral)   Resp 22   Ht 5' 8\" (1.727 m)   Wt 257 lb 8 oz (116.8 kg)   SpO2 97%   BMI 39.15 kg/m²     Current medications that patient is taking have been reviewed.     General appearance: Ill appearing man  HEENT: AT/NC, MMM  Neck: FROM, supple  Lungs: Clear to auscultation anteriorly, WOB normal  CV: RRR, no MRGs  Abdomen: Soft, less distended than before, non-tender; no masses or HSM, +BS  Extremities: No peripheral edema or digital cyanosis  Skin: Somewhat jaundiced  Psych: Calm and cooperative  Neuro: A&Ox3, no asterixis    Labs:  CBC with Differential:    Lab Results   Component Value Date    WBC 7.0 02/14/2022    RBC 2.48 02/14/2022    HGB 7.7 02/14/2022    HCT 24.0 02/14/2022     02/14/2022    MCV 96.8 02/14/2022    MCH 31.0 02/14/2022    MCHC 32.1 02/14/2022    RDW 19.6 02/14/2022    LYMPHOPCT 3.2 02/07/2022    MONOPCT 8.0 02/07/2022    BASOPCT 0.1 02/07/2022    MONOSABS 0.73 02/07/2022    LYMPHSABS 0.29 02/07/2022    EOSABS 0.00 02/07/2022    BASOSABS 0.01 02/07/2022     CMP:    Lab Results   Component Value Date     02/14/2022    K 3.9 02/14/2022     02/14/2022    CO2 25 02/14/2022    BUN 12 02/14/2022    CREATININE 0.6 02/14/2022    GFRAA >60 02/14/2022    LABGLOM >60 02/14/2022    GLUCOSE 73 02/14/2022    PROT 6.3 02/14/2022    LABALBU 3.0 02/14/2022    CALCIUM 7.6 02/14/2022    BILITOT 12.8 02/14/2022    ALKPHOS 120 02/14/2022     02/14/2022     02/14/2022          Assessment/Plan:  Principal Problem:    Hematemesis  Active Problems:    Bleeding esophageal varices (HCC)    Alcohol abuse    Acute blood loss anemia    Pulmonary mass    Hyperkalemia    Morbid obesity with BMI of 40.0-44.9, adult (HCC)    Transaminitis    Shock liver    Acute CHF (congestive heart failure) ([facility] [address]  Resolved Problems:    * No resolved hospital problems. *       Severe acute liver injury. LFT's are much better, INR stuck at 2.1    Hemoglobin stabilized    CXR personally reviewed showing b/l generalized opacities c/w CHF.   Start IV Lasix    Start nonselective BB soon    Continue thiamine, folic acid, multivitamin    Continue lactulose and rifaximin, no signs of HE right now    PPI    DVT prophylaxis: UFH  Requires continued inpatient level of care     Mary Solomon MD    5:09 PM  2/14/2022

## 2022-02-14 NOTE — PROGRESS NOTES
[facility]  PROGRESS NOTE  ATTENDING NOTE    CRITICAL CARE    CC:  GI Bleed    HPI  History obtained from: electronic medical record  Estela Gilliam is a 42 y.o. male with history of alcohol abuse who presents to [address] [place] [address] with GI hemorrhage. He initially presented to [facility] with abdominal pain, nausea, and hematemesis. He was diaphoretic, pale, and reportedly appeared to be in distress. He was given 1 L fluid and 2 units of trauma blood. He was started on a Protonix drip. He underwent EGD with gI team where he was noted to have bleeding esophageal varices. Banding was attempted 14 times and the patient was still bleeding. A blakemore tube was inserted and the patient was transferred to [address] [place] [address] for TIPS procedure and advanced GI services.      Patient reportedly drinks at least 1 glass of vodka a day for the past 10 years. He experiences withdrawal symptoms if he does not drink everyday. He denied history of varices or liver disease/cirrhosis. He is not on any anticoagulation.      Patient Active Problem List   Diagnosis    GI bleed    Bleeding esophageal varices (HCC)    Alcohol abuse    Acute blood loss anemia    Pulmonary mass    Hyperkalemia    Hematemesis    Morbid obesity with BMI of 40.0-44.9, adult (HCC)    Transaminitis    Shock liver       OVERNIGHT EVENTS:  No issues overnight, intermittently confused, on 13 L nasal cannula    HOSPITAL COURSE:  /6--presented to [facility] with hematemesis  2/7--underwent EGD with esophageal variceal banding--continued bleeding and SB tube placed; transferred here for TIPS; underwent TIPS this AM  2/8--both esophageal and gastric balloons deflated yesterday--no evidence of ongoing hemorrhage  2/9--unresponsive overnight; not following commands; sedation held this AM  2/10--intermittent agitation overnight; febrile overnight  2/11--tolerating TF  2/12--extubated yesterday  2/13--intermittent agitation  2/14--advance diet to low-sodium diet, EZ Pap with duo nebs and Pep flutter given to patient, FMS removed, Lasix given MELD 26    /72   Pulse 92   Temp 98.9 °F (37.2 °C) (Oral)   Resp 20   Ht 5' 8\" (1.727 m)   Wt 257 lb 8 oz (116.8 kg)   SpO2 93%   BMI 39.15 kg/m²   Physical Exam  Constitutional:       Appearance: He is obese. He is ill-appearing. Comments: Jaundiced   HENT:      Head: Normocephalic and atraumatic. Nose: Nose normal.      Mouth/Throat:      Mouth: Mucous membranes are moist.      Pharynx: Oropharynx is clear. Eyes:      General: Scleral icterus present. Extraocular Movements: Extraocular movements intact. Pupils: Pupils are equal, round, and reactive to light. Cardiovascular:      Rate and Rhythm: Normal rate and regular rhythm. Pulses: Normal pulses. Heart sounds: Normal heart sounds. Pulmonary:      Effort: Pulmonary effort is normal.      Breath sounds: Normal breath sounds. Abdominal:      General: There is no distension. Palpations: Abdomen is soft. Tenderness: There is no abdominal tenderness. Comments: Obese abdomen   Musculoskeletal:         General: No tenderness or signs of injury. Cervical back: Normal range of motion and neck supple. Skin:     General: Skin is warm and dry. Neurological:      General: No focal deficit present. Mental Status: He is alert. Comments: Slightly confused. He knows he is in the hospital.  Michael Alford. Wakes to talk to you but then falls right back to sleep. Psychiatric:         Mood and Affect: Mood normal.         Behavior: Behavior normal.         Thought Content: Thought content normal.         Judgment: Judgment normal.         Lines: Escudero:  no  Central line:  no  PICC:  no    I have reviewed the laboratory studies    CXR: Atelectasis with increasing vascular congestion. ASSESSMENT/PLAN:  1.  Neuro: Hepatic encephalopathy--continue to monitor ammonia levels, continue lactulose and rifaximin  2. Cardio:  No active issues   3. Respiratory: acute respiratory failure--extubated on 2/11, continue with high requirements of oxygen. Chest x-ray reviewed. Lasix given, EZ Pap with duo nebs, SMI given, Pep flutter  4. GI: GI bleed due to variceal bleed--status post EGD with banding with GI, Blakemore then TIPS--monitor for further bleeding  5. FEN: moderate malnutrition--advance diet as tolerated to low sodium diet  a. Hypocalcemia--replace  6. Renal: No acute issues, remove yeung  7. Heme: Acute blood loss anemia--monitor  8. Endocrine: Keep glucose less than 180  a. Morbid obesity--diet education prior to discharge  9. ID: No acute issues, stop Rocephin      DVT/GI ppx: Heparin subcu, PPI    CC TIME:  I spent 40 min managing this patients critical issues which are a constant threat to life excluding time teaching and performing procedures.       Michial Holstein, MD, MSc, FACS  2/14/2022  4:25 PM

## 2022-02-14 NOTE — PROGRESS NOTES
Physical Therapy  Physical Therapy Initial Assessment     Name: Jennifer Norwood  : 1975  MRN: [id number]      Date of Service: 2022    Evaluating PT:  Daron Hall, PT, DPT [id number]    Room #:  0398/6752-D  Diagnosis:  Bleeding esophageal varices ([facility] [I85.01]  PMHx/PSHx:  EtOH abuse  Procedure/Surgery:  22 EGD with banding and IR TIPS Insertion   Precautions:  Falls, O2, NG tube, Cognition  Equipment Needs:  TBD    SUBJECTIVE:    Pt lives with wife and children in a 2 story home. There are 8 steps and 1 rail to reach second level of home, where bedroom and bathroom are located. Pt ambulated with no device and independent PTA. OBJECTIVE:   Initial Evaluation  Date: 22 Treatment Short Term/ Long Term   Goals   AM-PAC 6 Clicks 82/30     Was pt agreeable to Eval/treatment? Yes     Does pt have pain?  No reported pain     Bed Mobility  Rolling: NT  Supine to sit: ModA x2 with HOB elevated  Sit to supine: NT  Scooting: NT  Independent    Transfers Sit to stand: ModA from bed  Stand to sit: Yvrose  Stand pivot: NT  Independent   Ambulation   10 feet with ModA with HHA   70 feet x 2 reps with [employer] and Yvrose   >400 feet Independently    Stair negotiation: ascended and descended NT  >8 steps with 1 rail Mod Independent    ROM BUE:  Defer to OT note  BLE:  WFL     Strength BUE:  Defer to OT note  BLE:  4/5  Increase by 1/3 MMT grade   Balance Sitting EOB:  Yvrose dynamic  Dynamic Standing:  Yvrose with [employer]  Sitting EOB:  Independent   Dynamic Standing:  Independent     Pt is A & O x 4  CAM-ICU: NT  RASS: 0  Sensation:  No parasthesias reported   Edema:  None    Vitals:  Heart Rate at rest 90 bpm Heart Rate post session 103 bpm   SpO2 at rest 98% SpO2 post session 94%   Blood Pressure at rest 152/86 mmHg Blood Pressure post session 105/64 mmHg       Functional Status Score-Intensive Care Unit (FSS-ICU)   Rolling -/7   Supine to sit transfer 2/7   Unsupported sitting  4/   Sit to stand transfers 3/7 Ambulation 4/7   Total  13/35       Therapeutic Exercises:  None    Patient education  Pt educated on role of PT and safety     Patient response to education:   Pt verbalized understanding Pt demonstrated skill Pt requires further education in this area   x x x     ASSESSMENT:    Conditions Requiring Skilled Therapeutic Intervention:    [x]Decreased strength     []Decreased ROM  [x]Decreased functional mobility  [x]Decreased balance   [x]Decreased endurance   []Decreased posture  []Decreased sensation  []Decreased coordination   []Decreased vision  [x]Decreased safety awareness   []Increased pain       Comments:  RN reported pt was medically stable. Pt was in bed upon arrival, agreeable to initial evaluation. Pt was eager to move throughout duration of initial evaluation which required moderate verbal cues to maintain safety with activities. Pt required increasing physical assistance through trunk and LEs to reach EOB. Pt required physical assistance through trunk/low back to help initiate STS from EOB. Ambulation was trialed without WW and pt was off balance and required hand held assistance. Pt's balance improved with WW but was still impaired. During ambulation, pt had a decreased gait speed and step length. Fatigue limited activity. Pt was left in chair with all needs met and call light in reach. All lines remained intact. Treatment:  Patient practiced and was instructed in the following treatment:     Bed mobility training - pt given verbal and tactile cues to facilitate proper sequencing and safety during supine>sit as well as provided with physical assistance.  Sitting EOB for >5 minutes for upright tolerance, postural awareness and BLE ROM   Transfer training - pt was given verbal and tactile cues to facilitate proper hand placement, technique and safety during sit to stand and stand to sit  as well as provided with physical assistance.    Gait training- pt was given verbal and tactile cues to facilitate safety and balance during ambulation as well as provided with physical assistance. Pt's/ family goals   1. Return home    Prognosis is good for reaching above PT goals. Patient and or family understand(s) diagnosis, prognosis, and plan of care. Yes    PHYSICAL THERAPY PLAN OF CARE:    PT POC is established based on physician order and patient diagnosis     Referring provider/PT Order:  ALEJANDRA Trejo /02/07/22 0645 PT eval and treat  Diagnosis:  Bleeding esophageal varices (HCC) [I85.01]  Specific instructions for next treatment:  Progress activity    Current Treatment Recommendations:     [x] Strengthening to improve independence with functional mobility   [] ROM to improve independence with functional mobility   [x] Balance Training to improve static/dynamic balance and to reduce fall risk  [x] Endurance Training to improve activity tolerance during functional mobility   [x] Transfer Training to improve safety and independence with all functional transfers   [x] Gait Training to improve gait mechanics, endurance and asses need for appropriate assistive device  [x] Stair Training in preparation for safe discharge home and/or into the community   [] Positioning to prevent skin breakdown and contractures  [x] Safety and Education Training   [x] Patient/Caregiver Education   [] HEP  [] Other     PT long term treatment goals are located in above grid    Frequency of treatments: 2-5x/week x 1-2 weeks. Time in  0940  Time out  1025    Total Treatment Time  30 minutes     Evaluation Time includes thorough review of current medical information, gathering information on past medical history/social history and prior level of function, completion of standardized testing/informal observation of tasks, assessment of data and education on plan of care and goals.     CPT codes:  [] Low Complexity PT evaluation 34942  [x] Moderate Complexity PT evaluation 47143  [] High Complexity PT evaluation 40993  [] PT Re-evaluation [id number]  [] Gait training 20539 - minutes  [] Manual therapy 73563 - minutes  [x] Therapeutic activities 33928 30 minutes  [] Therapeutic exercises 89487 - minutes  [] Neuromuscular reeducation 56831 - minutes     [facility], [address]  [id number]

## 2022-02-14 NOTE — CONSULTS
[facility]                  [address] [place],  [facility]                                  CONSULTATION    PATIENT NAME: Suri Dubose                :        1975  MED REC NO:   [id number]                            ROOM:       2190  ACCOUNT NO:   [de-identified]                           ADMIT DATE: 2022  PROVIDER:     Raquel Schaefer MD    CONSULT DATE:  2022    AGE:  42. SEX:  Male. CHIEF COMPLAINT:  Confusion. HISTORY OF PRESENT ILLNESS:  The patient was admitted to [address] [place] [address]  on 2022 with an acute GI bleed. He was found to have bleeding  esophageal varices. He had a banding procedure done on _____. He  tolerated that well. He has had significant confusion and functional  deficits since his admission. We do not have a formal cognitive eval,  but he had confusion. On my exam, he has been initially severely  confused and unable to follow commands. He is making improvements in  that regard. He has been seen by Therapy. He is minimal assist for  ambulating about 70 feet with a wheeled walker. He was only able to go  10 feet with moderate assist without the walker. For OT on the  2022, he was not able to follow commands, but today he was at a  moderate to dependent level with his ADLs. He is not able to give me  much in the way of history. Labs showed significant elevation of his  liver functions, his AST was as high as 2100 with an ALT of 520. Those  have improved somewhat, but his bilirubin has been going up and is up to  12.8 now. PAST MEDICAL HISTORY:  The patient does have a pulmonary mass noted. He  has had GI bleed and liver dysfunction. There is also a history of  alcohol use disorder, but he is not able to give me specifics. ALLERGIES:  None known.     CURRENT MEDICATIONS:  Calcium gluconate, folic acid, Lasix, heparin,  Chronulac, Protonix, Seroquel, rifaximin and thiamine. SOCIAL HISTORY:  He lives with his wife. REVIEW OF SYSTEMS:  The patient is not able to give me an accurate  history. PHYSICAL EXAMINATION:  GENERAL:  Morbidly obese white male in no acute distress. HEAD:  Normocephalic, atraumatic. EYES:  Pupils equal, round, reactive to light. PHARYNX:  Normal.  LUNGS:  Clear to P and A. HEART:  Regular rate and rhythm. S1, S2 normal.  No murmurs or gallops. ABDOMEN:  Bowel sounds normal.  Soft, nontender. No masses. EXTREMITIES:  Without clubbing, cyanosis or edema. He is jaundiced  throughout. MENTAL STATUS:  The patient is alert. He is oriented to person. He was  not able to give me his age accurately. He is aware that he is in the  hospital, but does not know why. SENSATION:  Intact. NEUROMUSCULAR:  4/5 strength. There is some generalized weakness. PROBLEM LIST:  1. Metabolic encephalopathy. 2.  Alcohol use disorder. 3.  Hepatic impairment. 4.  Bleeding esophageal varices. RECOMMENDATIONS:  I want to get a cognitive eval by Speech. He has  significant functional deficits more so with his ADLs and with his  mobility. I also need to get a little clear picture about the discharge  plan. I know he lives with his wife, I am not sure if that is going to  be the plan for discharge currently from the hospital and he is also  still significantly impaired especially as far as his jaundice. We will  look at the pre-cert issues for him for acute rehab which may depend on  just how quickly he recovers his physical mobility.           Deanna Mayer MD    D: 02/14/2022 13:53:34       T: 02/14/2022 13:57:29     [email or web address]  Job#: [id number]     Doc#: [id number]    CC:

## 2022-02-14 NOTE — PROGRESS NOTES
Hepatology Progress Note        SUBJECTIVE:      Patient is extubated w/ Siomara Seat removed. He reports feeling well and is overall in good spirits. He is hopeful for his liver to make a meaningful recovery. OBJECTIVE    Physical    VITALS:  /81   Pulse 90   Temp 98.9 °F (37.2 °C) (Oral)   Resp 25   Ht 5' 8\" (1.727 m)   Wt 257 lb 8 oz (116.8 kg)   SpO2 95%   BMI 39.15 kg/m²   Physical Exam:  General: Chronically ill-appearing, NAD  HEENT: PERRLA, EOMI, MMM, no rhinorrhea. Scleral icterus  Cards: RRR, no LE edema  Resp: Breathing comfortably on room air, good air movement, no use of accessory muscles, no audible wheezing  Abdomen: soft, NT, mild distention. Extremities: Moves all extremities, no effusions or bruising. Skin: No rashes. Jaundice. Neuro: A&O x 3, CN grossly intact, non-focal exam. No encephalopathy. ASSESSMENT AND PLAN:  46y/M w/ history of EtOH abuse presents w/ acute esophageal variceal bleed s/p EGD w/ banding which was unable to stop the active bleeding now s/p TIPS placement.     DF: 22.7  MELD on admission: 18  MELD today: 26     PLAN:  1. 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  -His MELD is worsening though clinical status is improving.   -Will continue to monitor. 2. Esophageal Variceal Hemorrhage:  -Hgb stable. -Continue PPI BID. -Complete 5 day course of Ceftriaxone  -TIPS Duplex showed patency.      3. Hepatic Encephalopathy:  -Continue lactulose with titration to 3-4 BMs daily.  -Continue rifaximin BID. 4. Ascites:  -I will order a RUQ u/s to assess for ascites.      I will follow closely.     Thank you for including us in the care of this patient.  Please do not hesitate to contact us with any additional questions or concerns.     Sergio Magana MD  Gastroenterology/Hepatology  [facility]

## 2022-02-14 NOTE — PROGRESS NOTES
Consult received. Dr. Srinivasa Ruiz to evaluate patient's appropriateness for ARU.     Krys Proctor RN, Pre-screener for Acute Rehab  P: [phone]

## 2022-02-15 ENCOUNTER — APPOINTMENT (OUTPATIENT)
Dept: GENERAL RADIOLOGY | Age: 47
DRG: 405 | End: 2022-02-15
Attending: INTERNAL MEDICINE
Payer: COMMERCIAL

## 2022-02-15 ENCOUNTER — APPOINTMENT (OUTPATIENT)
Dept: ULTRASOUND IMAGING | Age: 47
DRG: 405 | End: 2022-02-15
Attending: INTERNAL MEDICINE
Payer: COMMERCIAL

## 2022-02-15 LAB
ALBUMIN SERPL-MCNC: 2.7 G/DL (ref 3.5–5.2)
ALP BLD-CCNC: 114 U/L (ref 40–129)
ALT SERPL-CCNC: 131 U/L (ref 0–40)
AMMONIA: 44 UMOL/L (ref 16–60)
ANION GAP SERPL CALCULATED.3IONS-SCNC: 10 MMOL/L (ref 7–16)
AST SERPL-CCNC: 176 U/L (ref 0–39)
BILIRUB SERPL-MCNC: 15.5 MG/DL (ref 0–1.2)
BLOOD CULTURE, ROUTINE: NORMAL
BUN BLDV-MCNC: 12 MG/DL (ref 6–20)
CALCIUM IONIZED: 1.1 MMOL/L (ref 1.15–1.33)
CALCIUM SERPL-MCNC: 7.6 MG/DL (ref 8.6–10.2)
CHLORIDE BLD-SCNC: 100 MMOL/L (ref 98–107)
CO2: 26 MMOL/L (ref 22–29)
CREAT SERPL-MCNC: 0.6 MG/DL (ref 0.7–1.2)
CULTURE, BLOOD 2: NORMAL
GFR AFRICAN AMERICAN: >60
GFR NON-AFRICAN AMERICAN: >60 ML/MIN/1.73
GLUCOSE BLD-MCNC: 74 MG/DL (ref 74–99)
HCT VFR BLD CALC: 23.3 % (ref 37–54)
HEMOGLOBIN: 7.6 G/DL (ref 12.5–16.5)
INR BLD: 2.3
MAGNESIUM: 1.9 MG/DL (ref 1.6–2.6)
MCH RBC QN AUTO: 30.9 PG (ref 26–35)
MCHC RBC AUTO-ENTMCNC: 32.6 % (ref 32–34.5)
MCV RBC AUTO: 94.7 FL (ref 80–99.9)
PDW BLD-RTO: 19.3 FL (ref 11.5–15)
PHOSPHORUS: 2.8 MG/DL (ref 2.5–4.5)
PLATELET # BLD: 152 E9/L (ref 130–450)
PMV BLD AUTO: 12.4 FL (ref 7–12)
POTASSIUM REFLEX MAGNESIUM: 3.9 MMOL/L (ref 3.5–5)
PROTHROMBIN TIME: 25.1 SEC (ref 9.3–12.4)
RBC # BLD: 2.46 E12/L (ref 3.8–5.8)
SODIUM BLD-SCNC: 136 MMOL/L (ref 132–146)
TOTAL PROTEIN: 6.1 G/DL (ref 6.4–8.3)
WBC # BLD: 6.3 E9/L (ref 4.5–11.5)

## 2022-02-15 PROCEDURE — 97535 SELF CARE MNGMENT TRAINING: CPT

## 2022-02-15 PROCEDURE — 6370000000 HC RX 637 (ALT 250 FOR IP): Performed by: STUDENT IN AN ORGANIZED HEALTH CARE EDUCATION/TRAINING PROGRAM

## 2022-02-15 PROCEDURE — 6360000002 HC RX W HCPCS: Performed by: STUDENT IN AN ORGANIZED HEALTH CARE EDUCATION/TRAINING PROGRAM

## 2022-02-15 PROCEDURE — 97530 THERAPEUTIC ACTIVITIES: CPT

## 2022-02-15 PROCEDURE — P9047 ALBUMIN (HUMAN), 25%, 50ML: HCPCS | Performed by: STUDENT IN AN ORGANIZED HEALTH CARE EDUCATION/TRAINING PROGRAM

## 2022-02-15 PROCEDURE — 6360000002 HC RX W HCPCS: Performed by: SURGERY

## 2022-02-15 PROCEDURE — 99291 CRITICAL CARE FIRST HOUR: CPT | Performed by: SURGERY

## 2022-02-15 PROCEDURE — 76705 ECHO EXAM OF ABDOMEN: CPT

## 2022-02-15 PROCEDURE — 36415 COLL VENOUS BLD VENIPUNCTURE: CPT

## 2022-02-15 PROCEDURE — 6370000000 HC RX 637 (ALT 250 FOR IP): Performed by: SURGERY

## 2022-02-15 PROCEDURE — 82330 ASSAY OF CALCIUM: CPT

## 2022-02-15 PROCEDURE — 94640 AIRWAY INHALATION TREATMENT: CPT

## 2022-02-15 PROCEDURE — 71045 X-RAY EXAM CHEST 1 VIEW: CPT

## 2022-02-15 PROCEDURE — 84100 ASSAY OF PHOSPHORUS: CPT

## 2022-02-15 PROCEDURE — 82140 ASSAY OF AMMONIA: CPT

## 2022-02-15 PROCEDURE — 6360000002 HC RX W HCPCS: Performed by: INTERNAL MEDICINE

## 2022-02-15 PROCEDURE — 97129 THER IVNTJ 1ST 15 MIN: CPT | Performed by: SPEECH-LANGUAGE PATHOLOGIST

## 2022-02-15 PROCEDURE — 36592 COLLECT BLOOD FROM PICC: CPT

## 2022-02-15 PROCEDURE — 99232 SBSQ HOSP IP/OBS MODERATE 35: CPT | Performed by: STUDENT IN AN ORGANIZED HEALTH CARE EDUCATION/TRAINING PROGRAM

## 2022-02-15 PROCEDURE — 80053 COMPREHEN METABOLIC PANEL: CPT

## 2022-02-15 PROCEDURE — 2000000000 HC ICU R&B

## 2022-02-15 PROCEDURE — 85027 COMPLETE CBC AUTOMATED: CPT

## 2022-02-15 PROCEDURE — 83735 ASSAY OF MAGNESIUM: CPT

## 2022-02-15 PROCEDURE — 85610 PROTHROMBIN TIME: CPT

## 2022-02-15 PROCEDURE — 2580000003 HC RX 258: Performed by: PHYSICIAN ASSISTANT

## 2022-02-15 PROCEDURE — 2580000003 HC RX 258: Performed by: STUDENT IN AN ORGANIZED HEALTH CARE EDUCATION/TRAINING PROGRAM

## 2022-02-15 PROCEDURE — C9113 INJ PANTOPRAZOLE SODIUM, VIA: HCPCS | Performed by: INTERNAL MEDICINE

## 2022-02-15 PROCEDURE — 2700000000 HC OXYGEN THERAPY PER DAY

## 2022-02-15 PROCEDURE — 92523 SPEECH SOUND LANG COMPREHEN: CPT | Performed by: SPEECH-LANGUAGE PATHOLOGIST

## 2022-02-15 RX ORDER — NADOLOL 20 MG/1
20 TABLET ORAL DAILY
Status: DISCONTINUED | OUTPATIENT
Start: 2022-02-15 | End: 2022-02-22 | Stop reason: HOSPADM

## 2022-02-15 RX ORDER — QUETIAPINE FUMARATE 25 MG/1
25 TABLET, FILM COATED ORAL NIGHTLY
Status: DISCONTINUED | OUTPATIENT
Start: 2022-02-15 | End: 2022-02-16

## 2022-02-15 RX ORDER — ALBUMIN (HUMAN) 12.5 G/50ML
25 SOLUTION INTRAVENOUS EVERY 8 HOURS
Status: DISCONTINUED | OUTPATIENT
Start: 2022-02-15 | End: 2022-02-15

## 2022-02-15 RX ORDER — FUROSEMIDE 10 MG/ML
20 INJECTION INTRAMUSCULAR; INTRAVENOUS ONCE
Status: COMPLETED | OUTPATIENT
Start: 2022-02-15 | End: 2022-02-15

## 2022-02-15 RX ADMIN — LACTULOSE 20 G: 10 SOLUTION ORAL at 14:45

## 2022-02-15 RX ADMIN — Medication 500 MG: at 08:32

## 2022-02-15 RX ADMIN — FOLIC ACID 1 MG: 1 TABLET ORAL at 08:33

## 2022-02-15 RX ADMIN — CALCIUM GLUCONATE 2000 MG: 98 INJECTION, SOLUTION INTRAVENOUS at 08:37

## 2022-02-15 RX ADMIN — PANTOPRAZOLE SODIUM 40 MG: 40 INJECTION, POWDER, FOR SOLUTION INTRAVENOUS at 08:33

## 2022-02-15 RX ADMIN — QUETIAPINE FUMARATE 25 MG: 25 TABLET ORAL at 20:32

## 2022-02-15 RX ADMIN — CHLORHEXIDINE GLUCONATE 0.12% ORAL RINSE 15 ML: 1.2 LIQUID ORAL at 20:33

## 2022-02-15 RX ADMIN — IPRATROPIUM BROMIDE AND ALBUTEROL SULFATE 1 AMPULE: .5; 2.5 SOLUTION RESPIRATORY (INHALATION) at 15:26

## 2022-02-15 RX ADMIN — HEPARIN SODIUM 5000 UNITS: 10000 INJECTION INTRAVENOUS; SUBCUTANEOUS at 14:45

## 2022-02-15 RX ADMIN — RIFAXIMIN 550 MG: 550 TABLET ORAL at 20:33

## 2022-02-15 RX ADMIN — PANTOPRAZOLE SODIUM 40 MG: 40 INJECTION, POWDER, FOR SOLUTION INTRAVENOUS at 20:33

## 2022-02-15 RX ADMIN — ALBUMIN (HUMAN) 25 G: 0.25 INJECTION, SOLUTION INTRAVENOUS at 08:32

## 2022-02-15 RX ADMIN — Medication 10 ML: at 08:34

## 2022-02-15 RX ADMIN — HEPARIN SODIUM 5000 UNITS: 10000 INJECTION INTRAVENOUS; SUBCUTANEOUS at 21:49

## 2022-02-15 RX ADMIN — ONDANSETRON 4 MG: 2 INJECTION INTRAMUSCULAR; INTRAVENOUS at 18:31

## 2022-02-15 RX ADMIN — MULTIVITAMIN 15 ML: LIQUID ORAL at 08:35

## 2022-02-15 RX ADMIN — LACTULOSE 20 G: 10 SOLUTION ORAL at 08:32

## 2022-02-15 RX ADMIN — Medication 100 MG: at 08:34

## 2022-02-15 RX ADMIN — RIFAXIMIN 550 MG: 550 TABLET ORAL at 08:35

## 2022-02-15 RX ADMIN — NADOLOL 20 MG: 20 TABLET ORAL at 12:36

## 2022-02-15 RX ADMIN — LACTULOSE 20 G: 10 SOLUTION ORAL at 20:33

## 2022-02-15 RX ADMIN — FUROSEMIDE 20 MG: 20 INJECTION, SOLUTION INTRAMUSCULAR; INTRAVENOUS at 12:36

## 2022-02-15 RX ADMIN — HEPARIN SODIUM 5000 UNITS: 10000 INJECTION INTRAVENOUS; SUBCUTANEOUS at 05:29

## 2022-02-15 RX ADMIN — Medication 6 MG: at 20:33

## 2022-02-15 RX ADMIN — Medication 500 MG: at 16:31

## 2022-02-15 RX ADMIN — QUETIAPINE FUMARATE 25 MG: 25 TABLET ORAL at 08:33

## 2022-02-15 RX ADMIN — IPRATROPIUM BROMIDE AND ALBUTEROL SULFATE 1 AMPULE: .5; 2.5 SOLUTION RESPIRATORY (INHALATION) at 08:10

## 2022-02-15 RX ADMIN — Medication 500 MG: at 20:35

## 2022-02-15 RX ADMIN — Medication 10 ML: at 20:33

## 2022-02-15 RX ADMIN — CHLORHEXIDINE GLUCONATE 0.12% ORAL RINSE 15 ML: 1.2 LIQUID ORAL at 08:33

## 2022-02-15 RX ADMIN — Medication 500 MG: at 12:36

## 2022-02-15 ASSESSMENT — PAIN SCALES - GENERAL
PAINLEVEL_OUTOF10: 0

## 2022-02-15 NOTE — PLAN OF CARE
Problem: Skin Integrity:  Goal: Will show no infection signs and symptoms  Description: Will show no infection signs and symptoms  Outcome: Met This Shift  Goal: Absence of new skin breakdown  Description: Absence of new skin breakdown  Outcome: Met This Shift     Problem: Falls - Risk of:  Goal: Will remain free from falls  Description: Will remain free from falls  Outcome: Met This Shift  Goal: Absence of physical injury  Description: Absence of physical injury  Outcome: Met This Shift     Problem: Airway Clearance - Ineffective:  Goal: Ability to maintain a clear airway will improve  Description: Ability to maintain a clear airway will improve  Outcome: Met This Shift     Problem: Anxiety/Stress:  Goal: Level of anxiety will decrease  Description: Level of anxiety will decrease  Outcome: Met This Shift     Problem: Cardiac Output - Decreased:  Goal: Hemodynamic stability will improve  Description: Hemodynamic stability will improve  Outcome: Met This Shift     Problem: Fluid Volume - Imbalance:  Goal: Absence of imbalanced fluid volume signs and symptoms  Description: Absence of imbalanced fluid volume signs and symptoms  Outcome: Met This Shift     Problem: Gas Exchange - Impaired:  Goal: Levels of oxygenation will improve  Description: Levels of oxygenation will improve  Outcome: Met This Shift     Problem: Mental Status - Impaired:  Goal: Mental status will be restored to baseline  Description: Mental status will be restored to baseline  Outcome: Met This Shift     Problem: Pain:  Goal: Pain level will decrease  Description: Pain level will decrease  Outcome: Met This Shift  Goal: Recognizes and communicates pain  Description: Recognizes and communicates pain  Outcome: Met This Shift     Problem: Skin Integrity - Impaired:  Goal: Will show no infection signs and symptoms  Description: Will show no infection signs and symptoms  Outcome: Met This Shift

## 2022-02-15 NOTE — PROGRESS NOTES
[facility]  PROGRESS NOTE  ATTENDING NOTE    CRITICAL CARE    CC:  GI Bleed    HPI  History obtained from: electronic medical record  Shabbir Franco is a 42 y.o. male with history of alcohol abuse who presents to [address] [place] [address] with GI hemorrhage. He initially presented to [facility] with abdominal pain, nausea, and hematemesis. He was diaphoretic, pale, and reportedly appeared to be in distress. He was given 1 L fluid and 2 units of trauma blood. He was started on a Protonix drip. He underwent EGD with gI team where he was noted to have bleeding esophageal varices. Banding was attempted 14 times and the patient was still bleeding. A blakemore tube was inserted and the patient was transferred to [address] [place] [address] for TIPS procedure and advanced GI services.      Patient reportedly drinks at least 1 glass of vodka a day for the past 10 years. He experiences withdrawal symptoms if he does not drink everyday. He denied history of varices or liver disease/cirrhosis. He is not on any anticoagulation. Patient Active Problem List   Diagnosis    GI bleed    Bleeding esophageal varices (HCC)    Alcohol abuse    Acute blood loss anemia    Pulmonary mass    Hyperkalemia    Hematemesis    Morbid obesity with BMI of 40.0-44.9, adult (HCC)    Transaminitis    Shock liver    Acute CHF (congestive heart failure) (HCC)       OVERNIGHT EVENTS:  Oxygen down to 9 L. Good response to Lasix yesterday.   Bilirubin keeps increasing    HOSPITAL COURSE:  /6--presented to [facility] with hematemesis  2/7--underwent EGD with esophageal variceal banding--continued bleeding and SB tube placed; transferred here for TIPS; underwent TIPS this AM  2/8--both esophageal and gastric balloons deflated yesterday--no evidence of ongoing hemorrhage  2/9--unresponsive overnight; not following commands; sedation held this AM  2/10--intermittent agitation overnight; febrile overnight  2/11--tolerating TF  2/12--extubated yesterday  2/13--intermittent agitation  2/14--advance diet to low-sodium diet, EZ Pap with duo nebs and Pep flutter given to patient, FMS removed, Lasix given MELD 26  2/15 ultrasound abdomen negative for ascites, Lasix given, nadolol started    /76   Pulse 92   Temp 98.5 °F (36.9 °C) (Oral)   Resp 19   Ht 5' 8\" (1.727 m)   Wt 257 lb 8 oz (116.8 kg)   SpO2 94%   BMI 39.15 kg/m²   Physical Exam  Constitutional:       Appearance: He is obese. He is ill-appearing. Comments: Jaundiced   HENT:      Head: Normocephalic and atraumatic. Nose: Nose normal.      Mouth/Throat:      Mouth: Mucous membranes are moist.      Pharynx: Oropharynx is clear. Eyes:      General: Scleral icterus present. Extraocular Movements: Extraocular movements intact. Pupils: Pupils are equal, round, and reactive to light. Cardiovascular:      Rate and Rhythm: Normal rate and regular rhythm. Pulses: Normal pulses. Heart sounds: Normal heart sounds. Pulmonary:      Effort: Pulmonary effort is normal.      Breath sounds: Normal breath sounds. Abdominal:      General: There is no distension. Palpations: Abdomen is soft. Tenderness: There is no abdominal tenderness. Comments: Obese abdomen   Musculoskeletal:         General: No tenderness or signs of injury. Cervical back: Normal range of motion and neck supple. Skin:     General: Skin is warm and dry. Neurological:      General: No focal deficit present. Mental Status: He is alert. Comments: Slightly confused. He knows he is in the hospital.  Lucia Lang. Wakes to talk to you but then falls right back to sleep. Psychiatric:         Mood and Affect: Mood normal.         Behavior: Behavior normal.         Thought Content: Thought content normal.         Judgment: Judgment normal.         Lines:     Escudero:  no  Central line:  no  PICC:  no    I have reviewed the laboratory studies    CXR: Atelectasis with increasing vascular congestion. ASSESSMENT/PLAN:  1. Neuro: Hepatic encephalopathy--continue to monitor ammonia levels, continue lactulose and rifaximin  2. Cardio:  No active issues   3. Respiratory: acute respiratory failure--extubated on 2/11, continue with high requirements of oxygen. Chest x-ray reviewed. Lasix, EZ Pap with duo nebs, SMI given, Pep flutter  4. GI: GI bleed due to variceal bleed--status post EGD with banding with GI, Blakemore then TIPS--monitor for further bleeding, start nadolol  a. Hyperbilirubinemia--GI following, discussed steroids with medicine however patient high risk given the recent GI bleed prefer not to start of it that we do not think it will help his overall status. 5. FEN: moderate malnutrition--advance diet as tolerated to low sodium diet  a. Hypocalcemia--replace  6. Renal: Positive fluid balance-dose Lasix  7. Heme: Acute blood loss anemia--monitor  8. Endocrine: Keep glucose less than 180  a. Morbid obesity--diet education prior to discharge  9. ID: No acute issues      DVT/GI ppx: Heparin subcu, PPI    CC TIME:  I spent 40 min managing this patients critical issues which are a constant threat to life excluding time teaching and performing procedures.       Drew Chavez MD, MSc, FACS  2/15/2022  1:34 PM

## 2022-02-15 NOTE — PROGRESS NOTES
Physical Therapy  Physical Therapy Treatment    Name: Celi Soliman  : 1975  MRN: [id number]      Date of Service: 2/15/2022    Evaluating PT:  Amadouaudrey Cason, PT, DPT [id number]    Room #:  8969/3220-C  Diagnosis:  Bleeding [facility]ices ([facility] [I85.01]  PMHx/PSHx:  EtOH abuse  Procedure/Surgery:  22 EGD with banding and IR TIPS Insertion   Precautions:  Falls, O2, , Cognition  Equipment Needs:  TBD    SUBJECTIVE:    Pt lives with wife and children in a 2 story home. There are 8 steps and 1 rail to reach second level of home, where bedroom and bathroom are located. Pt ambulated with no device and independent PTA. OBJECTIVE:   Initial Evaluation  Date: 22 Treatment  Date: 2/15/22 Short Term/ Long Term   Goals   AM-PAC 6 Clicks 64/61 88/66    Was pt agreeable to Eval/treatment? Yes Yes    Does pt have pain?  No reported pain No reported pain     Bed Mobility  Rolling: NT  Supine to sit: ModA x2 with HOB elevated  Sit to supine: NT  Scooting: NT Rolling: NT  Supine to sit: Yvrose with HOB elevated  Sit to supine: NT  Scooting: SBA Independent    Transfers Sit to stand: ModA from bed  Stand to sit: Yvrose  Stand pivot: NT Sit to stand: Yvrose  Stand to sit: Yvrose  Stand pivot: NT Independent   Ambulation   10 feet with ModA with HHA   70 feet x 2 reps with [employer] and Yvrose  90 feet x 2 reps with [employer] and Yvrose >400 feet Independently    Stair negotiation: ascended and descended NT  >8 steps with 1 rail Mod Independent    ROM BUE:  Defer to OT note  BLE:  WFL     Strength BUE:  Defer to OT note  BLE:  4/5  Increase by 1/3 MMT grade   Balance Sitting EOB:  Yvrose dynamic  Dynamic Standing:  Yvrose with [employer] Sitting EOB:  SBA static, Yvrose dynamic  Dynamic Standing: Yvrose with [employer] Sitting EOB:  Independent   Dynamic Standing:  Independent     Pt is A & O x 2-3 with cues   CAM-ICU: NT  RASS: 0  Sensation:  No parasthesias reported   Edema:  None    Vitals:  Heart Rate at rest 90 bpm Heart Rate post session - bpm SpO2 at rest 94% SpO2 post session 95%   Blood Pressure at rest 114/74 mmHg Blood Pressure post session 125/67 mmHg       Functional Status Score-Intensive Care Unit (FSS-ICU)   Rolling -/7   Supine to sit transfer 4/7   Unsupported sitting  4/7   Sit to stand transfers 4/7   Ambulation 4/7   Total  16/35       Therapeutic Exercises:  None    Patient education  Pt educated on role of PT and safety     Patient response to education:   Pt verbalized understanding Pt demonstrated skill Pt requires further education in this area   x x x     ASSESSMENT:    Conditions Requiring Skilled Therapeutic Intervention:    [x]Decreased strength     []Decreased ROM  [x]Decreased functional mobility  [x]Decreased balance   [x]Decreased endurance   []Decreased posture  []Decreased sensation  []Decreased coordination   []Decreased vision  [x]Decreased safety awareness   []Increased pain       Comments:  RN reported pt was medically stable. Pt was in bed upon arrival, agreeable to treatment session. Pt was eager to move throughout session. Pt required physical assistance through trunk to reach EOB. Pt showed improvement in STS transfer from bed and required less assistance. Pt was assisted to bathroom. Pt had decreased safety awareness and required verbal cues and physical assistance to direct pt to toilet. Pt was given increased time to go to the bathroom. During ambulation pt continued to have decreased safety awareness and would run into objects with [employer] on the R side. Pt was made aware of this occurrence but still continued to be easily distracted by environment. Pt was left in chair with all needs met and call light in reach. All lines remained intact. Chair alarm on. Treatment:  Patient practiced and was instructed in the following treatment:     Bed mobility training - pt given verbal and tactile cues to facilitate proper sequencing and safety during supine>sit as well as provided with physical assistance.    Sitting EOB for >10 minutes for upright tolerance, postural awareness and BLE ROM   Transfer training - pt was given verbal and tactile cues to facilitate proper hand placement, technique and safety during sit to stand and stand to sit  as well as provided with physical assistance.  Gait training- pt was given verbal and tactile cues to facilitate safety and balance during ambulation as well as provided with physical assistance. PLAN:    Patient is making good progress towards established goals. Will continue with current POC.       Time in  1240  Time out  1340    Total Treatment Time  60 minutes     CPT codes:  [] Gait training 81973 - minutes  [] Manual therapy [id number] - minutes  [x] Therapeutic activities 80512 60 minutes  [] Therapeutic exercises 15417 - minutes  [] Neuromuscular reeducation 64884 - minutes    [facility], SPT Alana Grow, [place], [place]  [id number]

## 2022-02-15 NOTE — PROGRESS NOTES
Surgical Intensive Care Unit   Daily Progress Note     Patient's name:  Sadie Rosenthal  Age/Gender: 42 y.o. male  Date of Admission: 2/7/2022  6:01 AM  Length of Stay: 8    Reason for ICU: hemodynamic instability         Hospital Course:   2/6 Presented to [facility] with abdominal pain, nausea and hematemesis. Received 2 units of pRBCs. 2/7: Underwent EGD with 14 bands of esophageal varices. Still bleeding so Blakemore tube inserted. Plan for transfer to [facility] for TIPS procedure and advanced GI intervention. 2/8: Gastric bubble deflated on Blakemore tube. Patient agitated. Patient started on Lactulose. 2/9: Patient became increasingly more lethargic. Sedation stopped. Patient still without bowel movement. Started on Lactulose 20mg q2.  2/10: Patient started on 2.5 fentanyl prn. Agitated overnight. Continues to be febrile.   2/11--tolerating TF (extubated)  2/12--extubated yesterday  2/13--intermittent agitation  2/14: cxr and breathing tx, ezpap due to oxygebn requirements  2/15:no acute events, lasix, nadolol started for portal hypertension and varises    Overnight Events:   2/15: no acute events      Problem List:   Patient Active Problem List   Diagnosis    GI bleed    Bleeding esophageal varices (HCC)    Alcohol abuse    Acute blood loss anemia    Pulmonary mass    Hyperkalemia    Hematemesis    Morbid obesity with BMI of 40.0-44.9, adult (HCC)    Transaminitis    Shock liver    Acute CHF (congestive heart failure) (HCC)       Vent Settings: Additional Respiratory  Assessments  Pulse: 88  Resp: 19  SpO2: 96 %  Position: Semi-Rico's  Humidification Source: Heated wire  Humidification Temp: 37  Circuit Condensation: Drained  Oral Care Completed?: Yes  Oral Care: Mouth moisturizer  Subglottic Suction Done?: No  Airway Type: ET  Airway Size: 7.5  Skin barrier applied: No  ABG:   No results for input(s): PH, PCO2, PO2, HCO3, BE, O2SAT in the last 72 hours.    I/O:  I/O last 3 completed shifts: In: 8006 [P.O.:7335; NG/GT:571; IV Piggyback:100]  Out: 8371 [Urine:1665; Stool:1950]  I/O this shift:  In: 350 [P.O.:350]  Out: -   [REMOVED] Urethral Catheter-Output (mL): 60 mL  [REMOVED] Urethral Catheter 16 fr-Output (mL): 75 mL  [REMOVED] NG/OG/NJ/NE Tube Nasogastric Left nostril-Output (mL): 0 ml  Stool (measured) : 0 mL    Lines:   [facility] central line ()  R radial arterial line ()    Tubes:   Blakemore tube ()  ET ()    Drains:   Yeung ()    Drips:   sodium chloride      dextrose         Physical Exam:   /78   Pulse 88   Temp 98.9 °F (37.2 °C) (Oral)   Resp 19   Ht 5' 8\" (1.727 m)   Wt 257 lb 8 oz (116.8 kg)   SpO2 96%   BMI 39.15 kg/m²     Average, Min, and Max for last 24 hours Vitals:  Temp:  Temp  Av °F (37.2 °C)  Min: 98.6 °F (37 °C)  Max: 99.9 °F (37.7 °C)  RR: Resp  Av  Min: 18  Max: 30  HR: Pulse  Av.3  Min: 84  Max: 153  BP:  Systolic (57KBK), YHH:630 , Min:95 , IQB:479   ; Diastolic (79EER), WUW:56, Min:61, Max:93    SpO2: SpO2  Av.2 %  Min: 91 %  Max: 97 %        GCS:    6T    Pupil size:  Left 4 mm    Right 4 mm    Pupil reaction: Yes    Wiggles fingers: Left No Right No    Hand grasp:   Left none       Right none    Wiggles toes: Left No    Right No    Plantar flexion: Left none     Right none      CONSTITUTIONAL: no acute distress, alert and oriented x 3  NEUROLOGIC: PERRL   CARDIOVASCULAR: S1 S2, regular rate, regular rhythm, no murmur/gallop/rub  PULMONARY: no rhonchi/rales/wheezes. Pressure support   RENAL: yeung to gravity, dark urine  ABDOMEN: soft, nontender, moderately distended, nontympanic, no masses, no organomegaly,   SKIN/EXTREMITIES: no rashes/ecchymosis, no edema/clubbing, warm/dry, good capillary refill , jaundice      ASSESSMENT / PLAN:   Neuro:             - Pain/Sedation:  fentanyl prn  - Alcohol abuse: Phenobarbital held  due to altered mental status.  Monitor for signs/symptoms of withdrawal  - Hyperammonemia: Lactulose and Rifaximin. -AMS/hepatic encephalopathy- improving, seroquel 25 mg nightly     CV:  - Hemorrhagic shock- resolved. Continue to monitor hemodynamics     Pulm:   - Acute respiratory failure: extubated 2/11 wean oxygen as tolerated   - cxr, ez pap, duoneb, lasix for fluid overload noted on cxr    Renal:  - hyperkalemia: resolved. Continue to monitor daily CMP.  - Lactic acidosis: resolved     GI:                    - Diet:  general low salt  - variceal hemorrhage: s/p IR TIPS procedure 2/7., GI following, Monitor HgB, continue PPI, nadolol  - elevated transaminases: NAC completed 2/9  - SBP profalaxis: completed rocephin  - ETOH abuse - continue to monitor, thiamine, multivitamin  Hyperbilirubinemia- GI following will speak with regarding status and bilirubin     Heme:  - acute blood loss anemia: resolved Monitor HgB      ID:  - No acute issues     Endo:   - No acute issues. Keep glucose < 180.     MSK:  - No acute issues.  WBAT to all extremities     Bowel reg:                none  DVT ppx:                   SCDs, heparin  GI ppx:                      PPI  Seizure proph:         none  Mouth/eye care:       Lacrilube, Peridex  Escudero:                        none  Central lines:            [facility] central line  Family Update:         As available   CODE Status:           FULL      Dispo:                       SICU    Electronically signed by Geri Abdi MD on 2/15/2022 at 5:21 AM

## 2022-02-15 NOTE — PROGRESS NOTES
OCCUPATIONAL THERAPY TREATMENT NOTE    [address] [address] [address], [address] [place] [address]                                                               Patient Name: Aydin Danielle  MRN: [id number]  : 1975  Room: [address] [place] [address]    Evaluating OT: Elizabeth Eaton OTR/L [id number]     Referring Provider: ALEJANDRA Alvarenga   Specific Provider Orders/Date: OT eval and treat (22)        Diagnosis: GI BLEED                          Bleeding esophageal varices     Surgery/Procedures:        EGD ESOPHAGOGASTRODUODENOSCOPY ESOPHAGEAL BANDING       Pertinent Medical History:  alcohol abuse;,newly diagnosed Cirrhosis       *Precautions:  Fall Risk, NGT, O2     Assessment of current deficits   [x]? Functional mobility          [x]? ADLs           [x]? Strength                  [x]? Cognition   [x]? Functional transfers        [x]? IADLs         [x]? Safety Awareness   [x]? Endurance   [x]? Fine Coordination           [x]? ROM           []? Vision/perception    []? Sensation     [x]? Gross Motor Coordination [x]? Balance    []? Delirium                  []?Motor Control     []?  Communication     OT PLAN OF CARE   OT POC based on physician orders, patient diagnosis and results of clinical assessment.        Frequency/Duration: 1-3 days/wk for 1-2 weeks PRN    Specific OT Treatment to include:   ADL retraining/adapted techniques and AE recommendations to increase functional independence within precautions                    Energy conservation techniques to improve tolerance for selfcare routine   Functional transfer/mobility training/DME recommendations for increased independence, safety and fall prevention         Patient/family education to increase safety and functional independence within precautions              Environmental modifications for safe mobility and completion of ADLs                           Cognitive retraining ex's to improve problem solving skills & safe participation in ADLs/IADLs  Sensory re-education techniques to improve extremity awareness, maintain skin integrity and improve hand function                             Visual/Perceptual retraining  to improve body awareness and safety during transfers and ADLs  Therapeutic activity to improve functional performance during ADLs/IADLs                                         Therapeutic exercise to improve tolerance and functional strength for ADLs   Balance retraining exercises/tasks for facilitation of postural control with dynamic challenges during ADLs . Positioning to improve functional independence  Neuromuscular re-education: facilitation of righting/equilibrium reactions, normalization muscle tone/facilitation active functional movement                      Delirium prevention/treatment        Recommended Adaptive Equipment: TBA: ADL AE pending progress, shower seat, commode rails vs raised seat, AD as indicated      Home Living: Pt lives with wife and children  in a 2 floor plan with bed/bath on 2nd floor: flight with partial rail. Bathroom setup: tub/shower 2nd floor; half bath on first floor  Equipment owned: no DME     Prior Level of Function: IND with ADLs;  IND with IADLs. No device for ambulation. 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               Memory: fair 3/3 word recall with cues              Comprehension fair             Problem solving: fair (new learning- dressing AE)             Judgement/safety: fair-                 Communication skills: WFL             Vision: WFL; denies vision changes                    Glasses:yes                                                       Hearing: min Ramona vs inattention                RASS: 0  CAM-ICU: (NT) Delirium     UE Assessment:  Hand Dominance: Right [x]? Left []?       ROM Strength STM goal: PRN   RUE  WFL; impaired FMC 4/5 proximal  3+/5 distal  WFL for ADLS      LUE WFL; impaired FMC 4/5 proximal  3+/5 distal WFL for ADLS         Sensation: No c/o numbness/tingling in  extremities. Tone: WNL   Edema: min edema B UE/hands; B LE (feet)     Functional Assessment:  AM-PAC Daily Activity Raw Score: 16/24    Initial Eval Status  Date: 2/14 Treatment Status  Date:2/16 STGs = LTGs  Time frame: 7-14 days   Feeding Min A  (unsteady hands) to hold onto cup S; set up  seated in chair                       Valeri  while seated up in chair to increase activity tolerance         Grooming Mod A  (decreased safety regarding medical lines/tubes) Min A  standing sink level to wash hands; WW for support                         SBA   while standing sink level requiring AD as needed for balance and demonstrating G tolerance       UB dressing/bathing Mod A  Min A  (medical lines)                       Min A         LB dressing/bathing Dep      Max A overall  using AE after instruction (seated)     Socks- Mod A   (reacher; sock aid) Mod A overall  socks-Min A using sock aid seated EOB; fair recall of technique                       Min A   using AE as needed for safe reach/ energy conservation        Toileting catheter Min A                          Min A      Bed Mobility  Supine to sit:   Mod A+2     Sit to supine:   NT Supine to sit:   Min A    Sit to supine:   NT                         SBA  in prep of ADL tasks & transfers   Functional Transfers Sit to stand:    Mod A     Stand to sit:   Mod A     SPT: Mod A Sit to stand:   Min A     Stand to sit:   Min A     low commode:   Min A  *min cues for safe hand placement                          S  sit<>stand/functional bathroom transfers using AD/DME as needed for balance and safety   Functional Mobility Mod A no device  (short/shuffled steps)  Min A [employer]  Min A [employer]  vc's for walker safety; obstacles on R side                      S   functional/bathroom mobility using AD as needed & demonstrating G safety      Balance Sitting:     Static: Min A to SBA    Dynamic:Min A  Standing: Mod A Sitting:     Static: SBA    Dynamic:Min A  Standing: Min A [address] S dynamic sitting balance; S dynamic standing balance  during ADL tasks & transfers   Endurance/Activity Tolerance    F tolerance with light activity.   fair with moderate activity and short breaks G   tolerance with moderate activity/self care routine   Visual/  Perceptual Min cues for environmental awareness; safety     min cues for R sided environmental awareness                     Vitals:   HR at rest: 90 bpm HR at end of session: 89 bpm   Spo2 at rest:94% Spo2 at end of session: 95%   BP at rest: 134/81 mmHg BP at end of session: - mmHg     Treatment: OT treatment provided this date:  Bed mobility: Instruction on precautions prior to bed mobility to facilitate safe transfers and ADLS. HOB elevated to assist.     Balance retraining: Performed sitting/standing balance ex's with instruction to facilitate righting reactions with postural changes during ADLS. Pt provided with walker for stability. ADL retraining: Instruction on adapted techniques/AE(sock aid) to increase independence and safe reach during dressing/bathing activities. Pt demonstrated G understanding. Functional transfer training:  Instruction on postural cues, hand placement/sequencing, & walker safety to assist with balance and fall prevention during self care routine/bathroom transfers. Energy conservation: Education on breathing techniques, pacing, work simplification strategies & recommended bathroom DME for safety and energy conservation during self care tasks and activities of daily living. Line management and environmental modifications made prior to and end of session to ensure patient safety and to increase efficiency of session. Skilled monitoring of HR, O2 saturation, blood pressure and patient's response to activity performed throughout session. Comments: OK from RN to see patient. Upon arrival, patient supine in bed- agreeable to session. Pt demo fair tolerance with fair understanding of education/techniques. At end of session, patient left seated in chair to increase activity tolerance. Pt set up with meal tray. Chair alarm activated. Call light within reach, all lines and tubes intact. Pt instructed on use of call light for assistance and fall prevention. Overall, pt presents with decreased activity tolerance, dynamic balance, functional mobility limiting completion of ADLs and safe return home. Pt can benefit from continued skilled OT to increase safety, functional independence & quality of life. · Pt has made good progress towards set goals.    · Continue with current plan of care       Time In:1250              Time Out: 1348         Total Treatment Time: 58 min      Treatment Charges: Mins Units   Ther Ex  [id number]     Manual Therapy [id number]     Thera Activities [id number] 1   ADL/Home Mgt [id number] 00 3   Neuro Re-ed 72231     Orthotic manage/training  [id number]     Non-Billable Time [address], OTR/L 8151

## 2022-02-15 NOTE — PROGRESS NOTES
Called patient's wife Jessica Islas to discuss ARU and discharge planning, no answer, left message to call me back. Patient is appropriate for ARU as long as he has a safe home discharge plan. He will need to be on 6 liters or less of O2 before he can come to ARU, currently on 9 liters. He will require precert as well. PMR will continue to follow patient's progress. ADDENDUM:    Jessica Islas returned my call. She wants patient to come to [facility] ([facility] ARU, not [place] as they live closer to [facility] ([facility]. She states patient has a large family and good support system. Somebody can be with him at all times at home if needed. All questions answered at this time.     Dimitri Mary RN, Pre-screener for Acute Rehab  P: [phone]

## 2022-02-15 NOTE — PROGRESS NOTES
Chief Complaint:  Hematemesis     Subjective:    Feeling pretty good, joking, no abd pain but it is getting more swollen    Objective:    /76   Pulse 92   Temp 98.5 °F (36.9 °C) (Oral)   Resp 19   Ht 5' 8\" (1.727 m)   Wt 257 lb 8 oz (116.8 kg)   SpO2 94%   BMI 39.15 kg/m²     Current medications that patient is taking have been reviewed.     General appearance: Ill appearing man  HEENT: AT/NC, MMM  Neck: FROM, supple  Lungs: Clear to auscultation anteriorly, WOB normal  CV: RRR, no MRGs  Abdomen: Soft, a bit more protuberant than before, non-tender; no masses or HSM, +BS  Extremities: No peripheral edema or digital cyanosis  Skin: Jaundiced  Psych: Calm and cooperative  Neuro: A&Ox3, no asterixis    Labs:  CBC with Differential:    Lab Results   Component Value Date    WBC 6.3 02/15/2022    RBC 2.46 02/15/2022    HGB 7.6 02/15/2022    HCT 23.3 02/15/2022     02/15/2022    MCV 94.7 02/15/2022    MCH 30.9 02/15/2022    MCHC 32.6 02/15/2022    RDW 19.3 02/15/2022    LYMPHOPCT 3.2 02/07/2022    MONOPCT 8.0 02/07/2022    BASOPCT 0.1 02/07/2022    MONOSABS 0.73 02/07/2022    LYMPHSABS 0.29 02/07/2022    EOSABS 0.00 02/07/2022    BASOSABS 0.01 02/07/2022     CMP:    Lab Results   Component Value Date     02/15/2022    K 3.9 02/15/2022     02/15/2022    CO2 26 02/15/2022    BUN 12 02/15/2022    CREATININE 0.6 02/15/2022    GFRAA >60 02/15/2022    LABGLOM >60 02/15/2022    GLUCOSE 74 02/15/2022    PROT 6.1 02/15/2022    LABALBU 2.7 02/15/2022    CALCIUM 7.6 02/15/2022    BILITOT 15.5 02/15/2022    ALKPHOS 114 02/15/2022     02/15/2022     02/15/2022          Assessment/Plan:  Principal Problem:    Hematemesis  Active Problems:    Bleeding esophageal varices (HCC)    Alcohol abuse    Acute blood loss anemia    Pulmonary mass    Hyperkalemia    Morbid obesity with BMI of 40.0-44.9, adult (HCC)    Transaminitis    Shock liver    Acute CHF (congestive heart failure) (HCC)  Resolved Problems:    * No resolved hospital problems. *       Severe acute liver injury. LFT's are much better, INR stuck in 2's. D/w GI and surgery. Likely alc hep + superimposed shock liver. Just had life threatening GI bleed. We don't feel steroids are indicated at this time.   MELD 26    Hemoglobin stabilized    Start nadolol    Continue diuresis as tolerated    Continue thiamine, folic acid, multivitamin    Continue lactulose and rifaximin, no signs of HE right now    PPI    DVT prophylaxis: UFH  Requires continued inpatient level of care     Mehrdad Branch MD    2:44 PM  2/15/2022

## 2022-02-15 NOTE — PLAN OF CARE
Problem: Skin Integrity:  Goal: Will show no infection signs and symptoms  Description: Will show no infection signs and symptoms  2/14/2022 2048 by Carlos Escamilla RN  Outcome: Met This Shift     Problem: Skin Integrity:  Goal: Absence of new skin breakdown  Description: Absence of new skin breakdown  2/14/2022 2048 by Carlos Escamilla RN  Outcome: Met This Shift     Problem: Falls - Risk of:  Goal: Will remain free from falls  Description: Will remain free from falls  2/14/2022 2048 by Carlos Escamilla RN  Outcome: Met This Shift     Problem: Falls - Risk of:  Goal: Absence of physical injury  Description: Absence of physical injury  2/14/2022 2048 by Carlos Escamilla RN  Outcome: Met This Shift     Problem: Airway Clearance - Ineffective:  Goal: Ability to maintain a clear airway will improve  Description: Ability to maintain a clear airway will improve  Outcome: Met This Shift     Problem: Anxiety/Stress:  Goal: Level of anxiety will decrease  Description: Level of anxiety will decrease  2/14/2022 2048 by Carlos Escamilla RN  Outcome: Met This Shift

## 2022-02-15 NOTE — CARE COORDINATION
Pt A&O this am. O2 weaned to 9 Lpm. Bili 15.5. Abd us neg for ascites. Speech therapy evaluated today. Spoke with pt and wife regarding acute Rehab. Their choice is [facility]. Will need to be on 6L or less for AR. Will also need insurance auth and covid test when stable for rehab.

## 2022-02-15 NOTE — PROGRESS NOTES
SPEECH/LANGUAGE PATHOLOGY  SPEECH/LANGUAGE/COGNITIVE EVALUATION   and PLAN OF CARE      PATIENT NAME:  Sloane Vizcarra  (male)     MRN:  [id number]    :  1975  (42 y.o.)  STATUS:  Inpatient: Room 3803/3803-A    TODAY'S DATE:  2/15/2022  02/14/22 1300   SLP eval and treat Start: 22 1300, End: 22 1300, ONE TIME, Standing Count: 1 Occurrences, R    Eloy FAUSTO Sánchez MD  REASON FOR REFERRAL:  Assess cognitive assessment   EVALUATING THERAPIST: FORD Ricks    ADMITTING DIAGNOSIS: Bleeding esophageal varices ([facility] [address] [I85.01]    VISIT DIAGNOSIS:      SPEECH THERAPY  PLAN OF CARE   The speech therapy  POC is established based on physician order, speech pathology diagnosis and results of clinical assessment     SPEECH PATHOLOGY DIAGNOSIS:    Mild Cognitive Deficits    Speech Pathology intervention is recommended 3-6 times per week for LOS or when goals are met with emphasis on the following:      Conditions Requiring Skilled Therapeutic Intervention for speech, language and/or cognition    Cognitive linguistic impairment  Decreased short term memory  Decreased problem solving skills   Decreased thought organization    Specific Speech Therapy Interventions to Include: Therapeutic tasks for Cognition    Specific instructions for next treatment: To initiate POC    SHORT/LONG TERM GOALS  Pt will improve orientation to spatial and temporal surroundings with use of external memory aides.   Pt will improve immediate, short term, recent memory during structured and unstructured tasks with 90% accuracy   Pt will improve problem solving/thought organization during structured and unstructured tasks with 90% accuracy     Patient goals: Patient/family involved in developing goals and treatment plan:   Treatment goals discussed with Patient    The Patient understand(s) the diagnosis, prognosis and plan of care   The patient/family Agreed with above,     This plan may be re-evaluated and revised as understanding    Evaluation Time includes thorough review of current medical information, gathering information on past medical history/social history and prior level of function, completion of standardized testing/informal observation of tasks, assessment of data and education on plan of care and goals. CPT code:    46980  eval speech sound lang comprehension    INTERVENTION  CPT Code: 54721  therapeutic interventions that focus on cognitive function , initial  15 min    Speech Pathologist (SLP) completed education with the patient and/or family regarding type of cognitive impairment. Discussed compensatory strategies to assist in improving attention, STM/LTM, problem solving, abstract reasoning and safety/insight. Pt required semantic cues to increase recall of items presented verbally with a delay recall request. Encouraged patient and/or family to engage SLP in structured Q&A session relative to identified deficit areas. Patient and/or family indicated understanding of all information provided via satisfactory verbal response. The admitting diagnosis and active problem list, as listed below have been reviewed prior to initiation of this evaluation.         ACTIVE PROBLEM LIST:   Patient Active Problem List   Diagnosis    GI bleed    Bleeding esophageal varices (HCC)    Alcohol abuse    Acute blood loss anemia    Pulmonary mass    Hyperkalemia    Hematemesis    Morbid obesity with BMI of 40.0-44.9, adult (HCC)    Transaminitis    Shock liver    Acute CHF (congestive heart failure) ([facility] [address]       Josemanuel Guardian., [facility]-SLP  Speech-Language Pathologist  [id number]  2/15/2022

## 2022-02-16 ENCOUNTER — APPOINTMENT (OUTPATIENT)
Dept: GENERAL RADIOLOGY | Age: 47
DRG: 405 | End: 2022-02-16
Attending: INTERNAL MEDICINE
Payer: COMMERCIAL

## 2022-02-16 LAB
ALBUMIN SERPL-MCNC: 2.8 G/DL (ref 3.5–5.2)
ALP BLD-CCNC: 110 U/L (ref 40–129)
ALT SERPL-CCNC: 106 U/L (ref 0–40)
AMMONIA: 45 UMOL/L (ref 16–60)
ANION GAP SERPL CALCULATED.3IONS-SCNC: 11 MMOL/L (ref 7–16)
AST SERPL-CCNC: 159 U/L (ref 0–39)
BILIRUB SERPL-MCNC: 16.7 MG/DL (ref 0–1.2)
BILIRUBIN DIRECT: 11.8 MG/DL (ref 0–0.3)
BUN BLDV-MCNC: 19 MG/DL (ref 6–20)
CALCIUM IONIZED: 1.08 MMOL/L (ref 1.15–1.33)
CALCIUM SERPL-MCNC: 7.4 MG/DL (ref 8.6–10.2)
CHLORIDE BLD-SCNC: 93 MMOL/L (ref 98–107)
CO2: 24 MMOL/L (ref 22–29)
CREAT SERPL-MCNC: 0.9 MG/DL (ref 0.7–1.2)
GFR AFRICAN AMERICAN: >60
GFR NON-AFRICAN AMERICAN: >60 ML/MIN/1.73
GLUCOSE BLD-MCNC: 76 MG/DL (ref 74–99)
HCT VFR BLD CALC: 22.4 % (ref 37–54)
HEMOGLOBIN: 7.2 G/DL (ref 12.5–16.5)
INR BLD: 2.3
MAGNESIUM: 1.9 MG/DL (ref 1.6–2.6)
MCH RBC QN AUTO: 30.5 PG (ref 26–35)
MCHC RBC AUTO-ENTMCNC: 32.1 % (ref 32–34.5)
MCV RBC AUTO: 94.9 FL (ref 80–99.9)
PDW BLD-RTO: 20 FL (ref 11.5–15)
PHOSPHORUS: 2.7 MG/DL (ref 2.5–4.5)
PLATELET # BLD: 160 E9/L (ref 130–450)
PMV BLD AUTO: 12 FL (ref 7–12)
POTASSIUM REFLEX MAGNESIUM: 3.9 MMOL/L (ref 3.5–5)
PROTHROMBIN TIME: 25.3 SEC (ref 9.3–12.4)
RBC # BLD: 2.36 E12/L (ref 3.8–5.8)
SARS-COV-2, NAAT: NOT DETECTED
SODIUM BLD-SCNC: 128 MMOL/L (ref 132–146)
TOTAL PROTEIN: 6.2 G/DL (ref 6.4–8.3)
WBC # BLD: 7.2 E9/L (ref 4.5–11.5)

## 2022-02-16 PROCEDURE — 6370000000 HC RX 637 (ALT 250 FOR IP): Performed by: STUDENT IN AN ORGANIZED HEALTH CARE EDUCATION/TRAINING PROGRAM

## 2022-02-16 PROCEDURE — 6360000002 HC RX W HCPCS: Performed by: INTERNAL MEDICINE

## 2022-02-16 PROCEDURE — 83735 ASSAY OF MAGNESIUM: CPT

## 2022-02-16 PROCEDURE — 6370000000 HC RX 637 (ALT 250 FOR IP): Performed by: SURGERY

## 2022-02-16 PROCEDURE — C9113 INJ PANTOPRAZOLE SODIUM, VIA: HCPCS | Performed by: INTERNAL MEDICINE

## 2022-02-16 PROCEDURE — 94640 AIRWAY INHALATION TREATMENT: CPT

## 2022-02-16 PROCEDURE — 2580000003 HC RX 258: Performed by: STUDENT IN AN ORGANIZED HEALTH CARE EDUCATION/TRAINING PROGRAM

## 2022-02-16 PROCEDURE — 71045 X-RAY EXAM CHEST 1 VIEW: CPT

## 2022-02-16 PROCEDURE — 2700000000 HC OXYGEN THERAPY PER DAY

## 2022-02-16 PROCEDURE — 84100 ASSAY OF PHOSPHORUS: CPT

## 2022-02-16 PROCEDURE — 6360000002 HC RX W HCPCS: Performed by: SURGERY

## 2022-02-16 PROCEDURE — 2500000003 HC RX 250 WO HCPCS: Performed by: STUDENT IN AN ORGANIZED HEALTH CARE EDUCATION/TRAINING PROGRAM

## 2022-02-16 PROCEDURE — 85027 COMPLETE CBC AUTOMATED: CPT

## 2022-02-16 PROCEDURE — 2000000000 HC ICU R&B

## 2022-02-16 PROCEDURE — 85610 PROTHROMBIN TIME: CPT

## 2022-02-16 PROCEDURE — 36415 COLL VENOUS BLD VENIPUNCTURE: CPT

## 2022-02-16 PROCEDURE — 36592 COLLECT BLOOD FROM PICC: CPT

## 2022-02-16 PROCEDURE — 80053 COMPREHEN METABOLIC PANEL: CPT

## 2022-02-16 PROCEDURE — 82248 BILIRUBIN DIRECT: CPT

## 2022-02-16 PROCEDURE — 99233 SBSQ HOSP IP/OBS HIGH 50: CPT | Performed by: SURGERY

## 2022-02-16 PROCEDURE — 87635 SARS-COV-2 COVID-19 AMP PRB: CPT

## 2022-02-16 PROCEDURE — 82330 ASSAY OF CALCIUM: CPT

## 2022-02-16 PROCEDURE — 94667 MNPJ CHEST WALL 1ST: CPT

## 2022-02-16 PROCEDURE — 82140 ASSAY OF AMMONIA: CPT

## 2022-02-16 PROCEDURE — 2580000003 HC RX 258: Performed by: PHYSICIAN ASSISTANT

## 2022-02-16 RX ORDER — CALCIUM CHLORIDE 100 MG/ML
1000 INJECTION INTRAVENOUS; INTRAVENTRICULAR ONCE
Status: DISCONTINUED | OUTPATIENT
Start: 2022-02-16 | End: 2022-02-16 | Stop reason: CLARIF

## 2022-02-16 RX ORDER — FUROSEMIDE 10 MG/ML
20 INJECTION INTRAMUSCULAR; INTRAVENOUS ONCE
Status: COMPLETED | OUTPATIENT
Start: 2022-02-16 | End: 2022-02-16

## 2022-02-16 RX ADMIN — FOLIC ACID 1 MG: 1 TABLET ORAL at 09:44

## 2022-02-16 RX ADMIN — FUROSEMIDE 20 MG: 20 INJECTION, SOLUTION INTRAMUSCULAR; INTRAVENOUS at 16:40

## 2022-02-16 RX ADMIN — MULTIVITAMIN 15 ML: LIQUID ORAL at 09:45

## 2022-02-16 RX ADMIN — Medication 100 MG: at 09:44

## 2022-02-16 RX ADMIN — Medication 500 MG: at 13:19

## 2022-02-16 RX ADMIN — LACTULOSE 20 G: 10 SOLUTION ORAL at 14:50

## 2022-02-16 RX ADMIN — Medication 500 MG: at 20:43

## 2022-02-16 RX ADMIN — HEPARIN SODIUM 5000 UNITS: 10000 INJECTION INTRAVENOUS; SUBCUTANEOUS at 22:26

## 2022-02-16 RX ADMIN — CALCIUM CHLORIDE 1000 MG: 100 INJECTION, SOLUTION INTRAVENOUS at 08:23

## 2022-02-16 RX ADMIN — IPRATROPIUM BROMIDE AND ALBUTEROL SULFATE 1 AMPULE: .5; 2.5 SOLUTION RESPIRATORY (INHALATION) at 09:26

## 2022-02-16 RX ADMIN — Medication 500 MG: at 16:38

## 2022-02-16 RX ADMIN — NADOLOL 20 MG: 20 TABLET ORAL at 09:47

## 2022-02-16 RX ADMIN — CHLORHEXIDINE GLUCONATE 0.12% ORAL RINSE 15 ML: 1.2 LIQUID ORAL at 20:45

## 2022-02-16 RX ADMIN — PANTOPRAZOLE SODIUM 40 MG: 40 INJECTION, POWDER, FOR SOLUTION INTRAVENOUS at 20:42

## 2022-02-16 RX ADMIN — CHLORHEXIDINE GLUCONATE 0.12% ORAL RINSE 15 ML: 1.2 LIQUID ORAL at 09:23

## 2022-02-16 RX ADMIN — Medication 500 MG: at 09:23

## 2022-02-16 RX ADMIN — Medication 6 MG: at 20:43

## 2022-02-16 RX ADMIN — IPRATROPIUM BROMIDE AND ALBUTEROL SULFATE 1 AMPULE: .5; 2.5 SOLUTION RESPIRATORY (INHALATION) at 12:17

## 2022-02-16 RX ADMIN — HEPARIN SODIUM 5000 UNITS: 10000 INJECTION INTRAVENOUS; SUBCUTANEOUS at 14:20

## 2022-02-16 RX ADMIN — RIFAXIMIN 550 MG: 550 TABLET ORAL at 09:25

## 2022-02-16 RX ADMIN — LACTULOSE 20 G: 10 SOLUTION ORAL at 09:36

## 2022-02-16 RX ADMIN — IPRATROPIUM BROMIDE AND ALBUTEROL SULFATE 1 AMPULE: .5; 2.5 SOLUTION RESPIRATORY (INHALATION) at 15:28

## 2022-02-16 RX ADMIN — RIFAXIMIN 550 MG: 550 TABLET ORAL at 20:44

## 2022-02-16 RX ADMIN — Medication 10 ML: at 09:35

## 2022-02-16 RX ADMIN — Medication 10 ML: at 20:42

## 2022-02-16 RX ADMIN — PANTOPRAZOLE SODIUM 40 MG: 40 INJECTION, POWDER, FOR SOLUTION INTRAVENOUS at 09:44

## 2022-02-16 RX ADMIN — IPRATROPIUM BROMIDE AND ALBUTEROL SULFATE 1 AMPULE: .5; 2.5 SOLUTION RESPIRATORY (INHALATION) at 19:54

## 2022-02-16 RX ADMIN — HEPARIN SODIUM 5000 UNITS: 10000 INJECTION INTRAVENOUS; SUBCUTANEOUS at 06:04

## 2022-02-16 ASSESSMENT — PAIN SCALES - GENERAL
PAINLEVEL_OUTOF10: 0

## 2022-02-16 NOTE — PROGRESS NOTES
[facility]  PROGRESS NOTE  ATTENDING NOTE    CRITICAL CARE    CC:  GI Bleed    HPI  History obtained from: electronic medical record  Sarah Rogers is a 42 y.o. male with history of alcohol abuse who presents to [address] [place] [address] with GI hemorrhage. He initially presented to [facility] with abdominal pain, nausea, and hematemesis. He was diaphoretic, pale, and reportedly appeared to be in distress. He was given 1 L fluid and 2 units of trauma blood. He was started on a Protonix drip. He underwent EGD with gI team where he was noted to have bleeding esophageal varices. Banding was attempted 14 times and the patient was still bleeding. A blakemore tube was inserted and the patient was transferred to [address] [place] [address] for TIPS procedure and advanced GI services.      Patient reportedly drinks at least 1 glass of vodka a day for the past 10 years. He experiences withdrawal symptoms if he does not drink everyday. He denied history of varices or liver disease/cirrhosis. He is not on any anticoagulation. Patient Active Problem List   Diagnosis    GI bleed    Bleeding esophageal varices (HCC)    Alcohol abuse    Acute blood loss anemia    Pulmonary mass    Hyperkalemia    Hematemesis    Morbid obesity with BMI of 40.0-44.9, adult (HCC)    Transaminitis    Shock liver    Acute CHF (congestive heart failure) (HCC)       OVERNIGHT EVENTS:  Oxygen was down to 5 L this morning using it between four and 6 L.     HOSPITAL COURSE:  /6--presented to [facility] with hematemesis  2/7--underwent EGD with esophageal variceal banding--continued bleeding and SB tube placed; transferred here for TIPS; underwent TIPS this AM  2/8--both esophageal and gastric balloons deflated yesterday--no evidence of ongoing hemorrhage  2/9--unresponsive overnight; not following commands; sedation held this AM  2/10--intermittent agitation overnight; febrile overnight  2/11--tolerating TF  2/12--extubated yesterday  2/13--intermittent agitation  2/14--advance diet to low-sodium diet, EZ Pap with duo nebs and Pep flutter given to patient, FMS removed, Lasix given MELD 26  2/15 ultrasound abdomen negative for ascites, Lasix given, nadolol started  2/16 Lasix given, Seroquel stopped, free water restriction, transfer to [facility] floor    /75   Pulse 78   Temp 98.5 °F (36.9 °C) (Oral)   Resp 22   Ht 5' 8\" (1.727 m)   Wt 257 lb 11.2 oz (116.9 kg)   SpO2 95%   BMI 39.18 kg/m²   Physical Exam  Constitutional:       Appearance: He is obese. He is ill-appearing. Comments: Jaundiced   HENT:      Head: Normocephalic and atraumatic. Nose: Nose normal.      Mouth/Throat:      Mouth: Mucous membranes are moist.      Pharynx: Oropharynx is clear. Eyes:      General: Scleral icterus present. Extraocular Movements: Extraocular movements intact. Pupils: Pupils are equal, round, and reactive to light. Cardiovascular:      Rate and Rhythm: Normal rate and regular rhythm. Pulses: Normal pulses. Heart sounds: Normal heart sounds. Pulmonary:      Effort: Pulmonary effort is normal.      Breath sounds: Normal breath sounds. Abdominal:      General: There is no distension. Palpations: Abdomen is soft. Tenderness: There is no abdominal tenderness. Comments: Obese abdomen   Musculoskeletal:         General: No tenderness or signs of injury. Cervical back: Normal range of motion and neck supple. Skin:     General: Skin is warm and dry. Neurological:      General: No focal deficit present. Mental Status: He is alert. Comments: Follows commands   Psychiatric:         Mood and Affect: Mood normal.         Behavior: Behavior normal.         Thought Content: Thought content normal.         Judgment: Judgment normal.         Lines:     Escudero:  no  Central line:  no  PICC:  no    I have reviewed the laboratory studies    CXR: Atelectasis with increasing vascular congestion. ASSESSMENT/PLAN:  1. Neuro: Hepatic encephalopathy--continue to monitor ammonia levels, continue lactulose and rifaximin  2. Cardio:  No active issues   3. Respiratory: acute respiratory failure--extubated on 2/11, continue with high requirements of oxygen. Chest x-ray reviewed. Lasix, EZ Pap with duo nebs, SMI given, Pep flutter  4. GI: GI bleed due to variceal bleed--status post EGD with banding with GI, Blakemore then TIPS--monitor for further bleeding, continue nadolol  a. Hyperbilirubinemia--GI following, discussed steroids with medicine however patient high risk given the recent GI bleed prefer not to start of it that we do not think it will help his overall status. 5. FEN: moderate malnutrition--advance diet as tolerated to low sodium diet  a. Hypocalcemia--replace  b. Hyponatremia--free water restriction  6. Renal: Positive fluid balance-dose Lasix  7. Heme: Acute blood loss anemia--monitor  8. Endocrine: Keep glucose less than 180  a. Morbid obesity--diet education prior to discharge  9.  ID: No acute issues      DVT/GI ppx: Heparin subcu, PPI    Okay for MedSurg floor    Michial Holstein, MD, MSc, [facility]  2/16/2022  5:32 PM

## 2022-02-16 NOTE — PROGRESS NOTES
Surgical Intensive Care Unit   Daily Progress Note     Patient's name:  Celi Soliman  Age/Gender: 42 y.o. male  Date of Admission: 2/7/2022  6:01 AM  Length of Stay: 9    Reason for ICU: hemodynamic instability         Hospital Course:   2/6 Presented to [facility] with abdominal pain, nausea and hematemesis. Received 2 units of pRBCs. 2/7: Underwent EGD with 14 bands of esophageal varices. Still bleeding so Blakemore tube inserted. Plan for transfer to [facility] for TIPS procedure and advanced GI intervention. 2/8: Gastric bubble deflated on Blakemore tube. Patient agitated. Patient started on Lactulose. 2/9: Patient became increasingly more lethargic. Sedation stopped. Patient still without bowel movement. Started on Lactulose 20mg q2.  2/10: Patient started on 2.5 fentanyl prn. Agitated overnight. Continues to be febrile.   2/11--tolerating TF (extubated)  2/12--extubated yesterday  2/13--intermittent agitation  2/14: cxr and breathing tx, ezpap due to oxygebn requirements  2/15:no acute events, lasix, nadolol started for portal hypertension and varises  2/16: seroquel stopped, o2 requirement decreasing no lasix today, free water restriction 1500    Overnight Events:   2/16: no acute events      Problem List:   Patient Active Problem List   Diagnosis    GI bleed    Bleeding esophageal varices (HCC)    Alcohol abuse    Acute blood loss anemia    Pulmonary mass    Hyperkalemia    Hematemesis    Morbid obesity with BMI of 40.0-44.9, adult (HCC)    Transaminitis    Shock liver    Acute CHF (congestive heart failure) ([facility] [address]       Vent Settings: Additional Respiratory  Assessments  Pulse: 86  Resp: 24  SpO2: 94 %  Position: Semi-Rico's  Humidification Source: Heated wire  Humidification Temp: 37  Circuit Condensation: Drained  Oral Care Completed?: Yes  Oral Care: Teeth brushed,Mouthwash  Subglottic Suction Done?: No  Airway Type: ET  Airway Size: 7.5  Skin barrier withdrawal  - Hyperammonemia: Lactulose and Rifaximin. -AMS/hepatic encephalopathy- improving     CV:    - Hemorrhagic shock- resolved. Continue to monitor hemodynamics     Pulm:     - Acute respiratory failure: extubated 2/11 wean oxygen as tolerated   - , ez pap, duoneb,     Renal:  - hypocalcemia- replace  - Lactic acidosis: resolved     GI:                      - Diet:  general low salt  - variceal hemorrhage: s/p IR TIPS procedure 2/7., GI following, Monitor HgB, continue PPI, nadolol  - elevated transaminases: NAC completed 2/9  - SBP profalaxis: completed rocephin  - ETOH abuse - continue to monitor, thiamine, multivitamin  Hyperbilirubinemia- GI following  Meld -30     Heme:    - acute blood loss anemia: resolved Monitor HgB      ID:    - No acute issues     Endo:     - No acute issues. Keep glucose < 180.     MSK:    - No acute issues.  WBAT to all extremities     Bowel reg:                none  DVT ppx:                   SCDs, heparin  GI ppx:                      PPI  Seizure proph:         none  Mouth/eye care:       Lacrilube, Peridex  Escudero:                        none  Central lines:            [facility] central line  Family Update:         As available   CODE Status:           FULL      Dispo:                       transfer    Electronically signed by Pio Thomas MD on 2/16/2022 at 8:02 AM

## 2022-02-16 NOTE — PROGRESS NOTES
Patient is weaned down to 4 liters O2 and there is a transfer order in to downgrade to a general floor. Spoke to unit  Sonu Mark to update that we will be initiating precert for ARU today.     Romayne Doom RN, Pre-screener for Acute Rehab  P: [phone]

## 2022-02-16 NOTE — PLAN OF CARE
Problem: Skin Integrity:  Goal: Will show no infection signs and symptoms  Description: Will show no infection signs and symptoms  2/16/2022 0802 by Coral Simon RN  Outcome: Met This Shift     Problem: Skin Integrity:  Goal: Absence of new skin breakdown  Description: Absence of new skin breakdown  2/16/2022 0802 by Coral Simon RN  Outcome: Met This Shift     Problem: Falls - Risk of:  Goal: Will remain free from falls  Description: Will remain free from falls  2/16/2022 0802 by Coral Simon RN  Outcome: Met This Shift     Problem: Falls - Risk of:  Goal: Absence of physical injury  Description: Absence of physical injury  2/16/2022 0802 by Coral Simon RN  Outcome: Met This Shift     Problem: Discharge Planning:  Goal: Participates in care planning  Description: Participates in care planning  2/16/2022 0802 by Coral Simon RN  Outcome: Met This Shift     Problem: Airway Clearance - Ineffective:  Goal: Ability to maintain a clear airway will improve  Description: Ability to maintain a clear airway will improve  2/16/2022 0802 by Coral Simon RN  Outcome: Met This Shift     Problem: Aspiration:  Goal: Absence of aspiration  Description: Absence of aspiration  2/16/2022 0802 by Coral Simon RN  Outcome: Met This Shift     Problem: Pain:  Goal: Control of acute pain  Description: Control of acute pain  2/16/2022 0802 by Coral Simon RN  Outcome: Met This Shift     Problem: Pain:  Goal: Recognizes and communicates pain  Description: Recognizes and communicates pain  2/16/2022 0802 by Coral Simon RN  Outcome: Met This Shift

## 2022-02-16 NOTE — PROGRESS NOTES
Hepatology Progress Note        SUBJECTIVE:      Patient seen sitting upright in chair. He is tolerating PO intake. OBJECTIVE    Physical    VITALS:  /70   Pulse 84   Temp 98.5 °F (36.9 °C) (Oral)   Resp 20   Ht 5' 8\" (1.727 m)   Wt 257 lb 8 oz (116.8 kg)   SpO2 94%   BMI 39.15 kg/m²   Physical Exam:  General: Chronically ill-appearing, NAD  HEENT: PERRLA, EOMI, MMM, no rhinorrhea. Scleral icterus. Cards: RRR, no LE edema  Resp: Breathing comfortably on high flow NC, no auditory wheezing, no accessory muscle use  Abdomen: soft, NT, ND. Extremities: Moves all extremities, no effusions or bruising. Skin: No rashes. Jaundice. Neuro: A&O x 3, CN grossly intact, non-focal exam. No asterixis. ASSESSMENT AND PLAN:  46y/M w/ history of EtOH abuse presents w/ acute esophageal variceal bleed s/p EGD w/ banding which was unable to stop the active bleeding now s/p TIPS placement.     Admission DF: 22.7  MELD on admission: 18  MELD today: 28     PLAN:  1. Acute Alcoholic Hepatitis on EtOH Cirrhosis:  -Monitor CBC, CMP, and INR daily  -Albumin being held in setting of diuresis. -MELD is worsening though clinical status is improving. Poor prognostic indicator though likely still recovering from significant bleeding event.   -Patient is interested in pursuing inpatient rehab for alcohol use. -Will continue to monitor.      2. Esophageal Variceal Hemorrhage:  -Hgb stable. -Continue PPI BID. -5 day course of Ceftriaxone completed  -TIPS Duplex showed patency. -Nadolol noted to be started. This will be okay in the acute setting and will likely be able to be d/c in the near future in the setting of patent TIPS. -This can be continued until follow-up Endoscopy which will be performed on outpatient basis.       3. Hepatic Encephalopathy:  -Continue lactulose with titration to 3-4 BMs daily.  -Continue rifaximin BID.     4. Ascites:  -RUQ u/s with no evidence of ascites.        I will follow closely.     Thank you for including us in the care of this patient.  Please do not hesitate to contact us with any additional questions or concerns.     Kathy Delgado MD  Gastroenterology/Hepatology  Advanced Endoscopy

## 2022-02-16 NOTE — PROGRESS NOTES
Comprehensive Nutrition Assessment    Type and Reason for Visit:  Reassess    Nutrition Recommendations/Plan: Continue Current Diet, Start Oral Nutrition Supplement     Monitor LFT's - Ammonia WNL, however Bili trending up    Nutrition Assessment:  Pt status slowly improving now extubated w/ PO diet advanced awaiting ARU placement. Admit transferred from [facility] (s/p EGD/ banding x 14) for advanced GI services 2/2 hemorrhage/ bleeding esophageal varices s/p TIPS. Hx ETOH abuse/ Liver Cirrhosis. Will add Magic Cup BID to comply with fluid restriction. Malnutrition Assessment:  Malnutrition Status: At risk for malnutrition   Context:  Chronic Illness     Findings of the 6 clinical characteristics of malnutrition:  Energy Intake:  75% or less estimated energy requirements for 1 month or longer  Weight Loss:  Unable to assess (no wt hx on file)     Body Fat Loss:  No significant body fat loss     Muscle Mass Loss:  No significant muscle mass loss    Fluid Accumulation:  No significant fluid accumulation     Strength:  Not Performed    Estimated Daily Nutrient Needs:  Energy (kcal):  MSJ 2030 x 1.2 SF= 4063-3168; Weight Used for Energy Requirements:  Current     Protein (g):  ; Weight Used for Protein Requirements:   (1.3-1.5 g/kg, monitor LFT's/ Bili)        Fluid (ml/day):  per critical care    Nutrition Related Findings:  Pt alert s/p extubation, +I/O's 4L, generalized edema, active BS, Liver cirrhosis, jaundice (bili 16.7), hyponatremia      Wounds:  None       Current Nutrition Therapies:    ADULT DIET; Regular;  Low Sodium (2 gm); 1500 ml; No Carbonated Beverages  ADULT ORAL NUTRITION SUPPLEMENT; Lunch, Dinner; Frozen Oral Supplement    Anthropometric Measures:  · Height: 5' 8\" (172.7 cm)  · Current Body Weight: 257 lb (116.6 kg) (2/16 actual)   · Admission Body Weight: 261 lb (118.4 kg) (2/7 first measured)    · Usual Body Weight:  (UTO no measured wt hx on file)     · Ideal Body Weight: 154 lbs; % Ideal Body Weight 169.5 %   · BMI: 39.1 BMI Categories: Obese Class 2 (BMI 35.0 -39.9)       Nutrition Diagnosis:   · Inadequate oral intake related to inadequate protein-energy intake (2/2 liver dysfunction) as evidenced by intake 51-75%    Nutrition Interventions:   Nutrition Education/Counseling:  Education not appropriate   Coordination of Nutrition Care:  Continue to monitor while inpatient    Goals:  New Goal- Pt to consume >75% meals/ONS       Nutrition Monitoring and Evaluation:   Food/Nutrient Intake Outcomes:  Food and Nutrient Intake,Supplement Intake  Physical Signs/Symptoms Outcomes:  Biochemical Data,Nutrition Focused Physical Findings,Skin,Weight,Diarrhea,GI Status,Nausea or Vomiting,Fluid Status or Edema,Hemodynamic Status     Discharge Planning:     Too soon to determine     Electronically signed by Edith Farrar RD, LD on 2/16/22 at 10:38 AM EST    Contact: Ext [phone]

## 2022-02-16 NOTE — CARE COORDINATION
o2 weaned to 4-5 L. H&h stable, inr 2.3. bili 16.7 Cxr pending. Pt A&ox 3. Dc plan is for [facility] Acute rehab. Precert started today.

## 2022-02-16 NOTE — PROGRESS NOTES
Acute Rehab Pre-Admission Screen      Referral date: 22  Onset/Hospital Admit Date: 2022  6:01 AM    Current Location: [address] [place] [address]-A    Name: Ju Catchin/15/1975  Age: 42 y.o. Admitting Diagnosis: Metabolic Encephalopathy   Address: [address] [place] [address] [facility], [facility]  Home Phone: [phone] (home)  Social Security #:     Sex: male  Race:   Marital Status:    Ethnic/Cultural/Sikh Considerations: Zoroastrian    Advanced Directives: [x] Full Code  [] [facility] [] Medications only       [] Living Will  [] DPOA      []Organ donor      [] No mechanical breathing or ventilation     [] no tube feeding, nutrition or hydration      [x] Patient does not have advanced directives or living will    COVERAGE INFORMATION   Primary Insurer: [employer]   Payor Contact: Review Link  Authorization #: [id number]  Verified coverage: [] Patient  [x] Family/caregiver    [x] financial department [] insurance carrier    COVID SCREEN DATE:  22 - Negative      MEDICAL UPDATE:   History of present admission:  22 - 41 y/o male presents to ER with abdominal pain, back pain, and episodes of nausea and vomiting. Patient has a history of alcohol abuse. Patient admitted for GI bleed work up, esophageal varices, alcoholic cirrhosis, mediastinal mass, and hemorrhagic shock. Patient received 2 units PRBC's in ER due to low hemoglobin, hypotension, and tachycardia. 22 - General surgery consulted for possible GI bleed. Patient underwent an EGD, banding (14 bands), and insertion of sengstaken-blakemore tube. Patient transferred from [facility] to [facility] (1-), underwent TIPS procedure. Patient is intubated and on ventilator. Esophageal and gastric balloons deflated. 22 - Patient not showing any signs of ongoing hemorrhage. 22 - Patient had an episode of unresponsiveness overnight. Sedation held on this date.   2/10/22 - Patient febrile overnight.  22 - Patient is tolerating tube feedings. Extubated. 2/16/22 - Patient is tolerating an oral diet. PHYSICIAN / REFERRAL INFORMATION  Referring Physician: Linsey Palomares MD  Attending Physician: Shalom Contreras MD  Primary Care Physician: Uma Miguel MD  Consultants/Opinions (see full consult notes on chart): General Surgery (GI Bleed)    SOCIAL INFORMATION  Primary  Contact: Yves Chin  Relationship: Spouse  Primary Phone: [phone]  Secondary  Contact: Carlee Abraham  Relationship: Child  Primary Phone: [phone]    Previous Community Services: none known  Caregiver available: [x] Yes [] No Hours per day available: TBD  Patient previously employed:  [x] Yes [] Part Time [] Full Time [] No [] Retired    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)  Prior living arrangements: [x] Home  [] Assisted living  [] SNF [] Other  Lived with:  [] Alone  [x] Spouse  [] Family  [] Other  Lived with: [employer]  Contact phone: [phone]  Home:  2 story home  - entry steps  Rails: -  Steps:  flight to 2nd floor  Rails:  1   Bedroom: [] 1st floor  [x] 2nd floor    Bathroom:  [] 1st floor  [x] 2nd floor     Prior Functional Level: Independent for: ADLS, IADLS, ambulation with no AD, driving  Assistance for: none  Dependent for: none  Dominant hand: [x] Right  [] Left    Previous Home Equipment:  [] Cane [] Grab bars [] Orthotic / prosthetic   [] Shower chair [] Tub bench  [] 3-in-1 Commode [] Long handle sponge   [] Oxygen [] Sock aide  [] Wheelchair  [] motorized wc/scooter  [] Wheelchair cushion   [] Crutches [] Long handle shoehorn  [] Reachers [] Toilet seat elevator [] Rollator  [] Hedwig Blotter (wheeled)   [] Hedwig Blotter (standard) [] Mechanical lift    [x] None of the above     Has patient had 2 or more falls in the past year or any fall with injury in the past year? [] yes   [] no   [x]unknown    Has patient had major surgery during past 100 days prior to admission?    [] yes   [x] no Type/ Date:    Surgical History:  Past Surgical History:   Procedure Laterality Date    IR TIPS INSERTION  2/7/2022    IR TIPS INSERTION 2/7/2022 Rebecca Umanzor MD [facility]    UPPER GASTROINTESTINAL ENDOSCOPY N/A 2/7/2022    EGD ESOPHAGOGASTRODUODENOSCOPY ESOPHAGEAL BANDING, , INSERTION OF SENGSTAKEN-BLAKEMORE TUBE performed by Zeynep Kate MD at [address]       Past Medical:  No past medical history on file. Current Co-morbidities:  [] Alzheimer's   [] Dysphasia    [] Parkinsonism  [] Amputation   [] GERD   [] Peripheral artery disease   [] Anemia      [] Encephalopathy  [] Peripheral vascular disease  [] Anxiety   [] Gangrene   [] Pneumonia  [] Aphasia   [] Gout   [] Polyneuropathy  [] Asthma   [] Heart Failure (diastolic) [] Post-polio syndrome  [] Atrial fibrillation  [] Heart Failure (left-sided) [] Pseudomonas enteritis   [] Blind   [] Heart Failure (right-sided) [] Pulmonary embolism  [] Cellulitis     [] Heart Failure (systolic) [] Renal dialysis  [] Clostridium difficile  [] Hemiparesis  [] Renal failure  [] Congestive heart failure [] Hypertension  [] Rheumatoid arthritis  [] COPD   [] Hypotension  [] Seizure disorder   [] Coronary Artery Disease [] Hypothyroidism  [] Septicemia   [] Deaf   [] Hyperlipidemia  [] Sleep apnea  [] Depression   [] Morbid obesity  [] Spinal cord injury  [] Diabetes   [] MRSA   [] Stroke  [] Diabetic nephropathy [] Myocardial infarction [] Tracheostomy  [] Diabetic neuropathy [] Osteoarthritis  [] Traumatic brain injury   [] Diabetic retinopathy [] Osteoporosis  [] Urinary tract infection  [] DVT   [] Pancytopenia  [] Vocal cord paralysis  [] Spinal stenosis        [] Kidney disease  [] VRE  [] Post op   [x] Metabolic Encephalopathy    [x] Esophageal varices       Medical/Functional Conditions requiring inpatient rehabilitation: Patient has functional deficits in ADLS, mobility, speech, swallow and cognition, impaired balance, and needs ongoing medical management.  Requires multidisciplinary treatment including PM&R physician daily care, 24 hour rehabilitation nursing, physical therapy, occupational therapy, rehabilitation psychology, recreation therapy and rehabilitation social work, nutrition services due to new deficits. Risk for Medical/Clinical Complications: Falls, injury, pain, skin breakdown, abnormal vitals, abnormal labs, DVT, PE, pneumonia, decreased mobility, neuro changes. CLINICAL DATA:     Height : 5'8\"     Weight:  257lbs   BMI: 39       Date: 2/16/22 Date: 2/17/22 Date:    temperature 98.0 97.2    pulse 89 75    respirations 20 20    Blood pressure 114/68 130/65    Pulse oximeter 91% on 5L NC 93% on 4L NC       ALLERGIES: Patient has no known allergies. DIET : ADULT DIET; Regular;  Low Sodium (2 gm); 1500 ml; No Carbonated Beverages  ADULT ORAL NUTRITION SUPPLEMENT; Lunch, Dinner; Frozen Oral Supplement    Current Lab and Diagnostic Tests:   Recent Results (from the past 24 hour(s))   COVID-19, Rapid    Collection Time: 02/16/22  2:45 PM    Specimen: Nasopharyngeal Swab; Nares   Result Value Ref Range    SARS-CoV-2, NAAT Not Detected Not Detected   Phosphorus    Collection Time: 02/17/22  5:15 AM   Result Value Ref Range    Phosphorus 3.5 2.5 - 4.5 mg/dL   Magnesium    Collection Time: 02/17/22  5:15 AM   Result Value Ref Range    Magnesium 2.3 1.6 - 2.6 mg/dL   Calcium, ionized    Collection Time: 02/17/22  5:15 AM   Result Value Ref Range    Calcium, Ion 1.12 (L) 1.15 - 1.33 mmol/L   Protime-INR    Collection Time: 02/17/22  5:15 AM   Result Value Ref Range    Protime 24.2 (H) 9.3 - 12.4 sec    INR 2.2    Ammonia    Collection Time: 02/17/22  5:15 AM   Result Value Ref Range    Ammonia 48.0 16.0 - 60.0 umol/L   CBC    Collection Time: 02/17/22  5:15 AM   Result Value Ref Range    WBC 12.2 (H) 4.5 - 11.5 E9/L    RBC 2.65 (L) 3.80 - 5.80 E12/L    Hemoglobin 8.3 (L) 12.5 - 16.5 g/dL    Hematocrit 25.2 (L) 37.0 - 54.0 %    MCV 95.1 80.0 - 99.9 fL    MCH 31.3 26.0 - 35.0 pg    MCHC 32.9 32.0 - 34.5 %    RDW 20.7 (H) 11.5 - 15.0 fL    Platelets 407 746 - 062 E9/L    MPV 11.9 7.0 - 12.0 fL   Comprehensive Metabolic Panel w/ Reflex to MG    Collection Time: 02/17/22  5:15 AM   Result Value Ref Range    Sodium 130 (L) 132 - 146 mmol/L    Potassium reflex Magnesium 3.8 3.5 - 5.0 mmol/L    Chloride 93 (L) 98 - 107 mmol/L    CO2 25 22 - 29 mmol/L    Anion Gap 12 7 - 16 mmol/L    Glucose 85 74 - 99 mg/dL    BUN 21 (H) 6 - 20 mg/dL    CREATININE 0.9 0.7 - 1.2 mg/dL    GFR Non-African American >60 >=60 mL/min/1.73    GFR African American >60     Calcium 8.4 (L) 8.6 - 10.2 mg/dL    Total Protein 7.1 6.4 - 8.3 g/dL    Albumin 3.2 (L) 3.5 - 5.2 g/dL    Total Bilirubin 21.0 (H) 0.0 - 1.2 mg/dL    Alkaline Phosphatase 121 40 - 129 U/L     (H) 0 - 40 U/L     (H) 0 - 39 U/L     XR ABDOMEN (KUB) (SINGLE AP VIEW)    Result Date: 2/7/2022  No free air seen although patient is semi upright rather than upright which limits evaluation. XR CHEST PORTABLE    Result Date: 2/8/2022  1. Satisfactory position of the left internal jugular central venous catheter. There is no left pneumothorax. XR CHEST PORTABLE    Result Date: 2/8/2022  1. Satisfactory position of the endotracheal tube. 2. There are no findings of pneumonia or CHF. XR CHEST PORTABLE    Result Date: 2/7/2022  No acute process. CTA CHEST W CONTRAST    Result Date: 2/7/2022  1. There is a linear shadow through the anterior margin of ascending aorta and proximal aortic arch which is probably artifactual from pulsations although it is difficult to completely exclude a subtle aortic dissection. There is no evidence of dissection involving the distal arch or descending thoracic aorta. 2. No pulmonary embolus seen. 3. There is a 3.7 x 3.9 x 4.7 cm mass in the posteromedial lower left hemithorax, paraspinal region abutting descending thoracic aorta. Malignancy not excluded.   This this could be a pleural base mass or mass of neurogenic origin. 4. Hepatic steatosis. CTA ABDOMEN PELVIS W CONTRAST    Result Date: 2/7/2022  Intact abdominal and pelvic arteries with no aneurysm or dissection. A rounded masslike lesion in the left posterior mediastinum. Please refer to the report of CTA of the chest for details. Marked diffuse hepatic steatosis. Normal appendix. Distal colonic diverticulosis. No acute diverticulitis. Trace ascites. IR TIPS INSERTION    Result Date: 2/7/2022  Successful uncomplicated TIPS procedures Recommendations: 1. Follow-up Tips ultrasound at 24 hours 2.  Follow-up Tips ultrasound at 3 months     CTA ABDOMEN W CONTRAST    Result Date: 2/7/2022  Patent portal vein     Additional labs or diagnostic studies needed before admission to rehabilitation unit:  none    Medications:   nadolol  20 mg Oral Daily    folic acid  1 mg Oral Daily    thiamine mononitrate  100 mg Oral Daily    ipratropium-albuterol  1 ampule Inhalation Q4H WA    potassium & sodium phosphates  2 packet Oral 4x Daily    melatonin  6 mg Oral Nightly    heparin (porcine)  5,000 Units SubCUTAneous 3 times per day    lactulose  20 g Oral TID    multivitamin with iron-minerals  15 mL Oral Daily    rifaximin  550 mg Oral BID    sodium chloride flush  10 mL IntraVENous 2 times per day    pantoprazole  40 mg IntraVENous BID    chlorhexidine  15 mL Mouth/Throat BID      sodium chloride      dextrose       ondansetron, sodium chloride, magnesium hydroxide, sodium chloride flush, trimethobenzamide, glucose, dextrose, glucagon (rDNA), dextrose    SPECIAL PRECAUTIONS: [] No current precautions  [] Cardiac  [] Renal [] Sternal [] Respiratory      [x] Neurological           [] Hip  [] Spinal [] Seizure  [] Aspiration   [] Isolation precautions:    [] Contact   [] Respiratory   [] Protective     [] Droplet    [x] Weight Bearing precautions:         [] Non Weight Bearing:         [] Toe Touch Weight Bearing:        [] Partial Weight Bearing:         [x] Weight Bearing as Tolerated:        [x] Fall Risk:   [] Recent history of falls [] Falls risk level (Farnsworth Scale):      [] Bed Alarm    [x] Do not leave alone in the bathroom    [] Chair Alarm    [] Cognitive impairment      [] One to One supervision  [] Sitter / Arch Diver sitter   [] Safety enclosure bed  [x] Decreased balance     SPECIAL REHABILITATION NEEDS:   [] IV Therapy: [] PRN Adapter  [] Midline  [] PICC      [] Central Line    [] TPN       [x] Oxygen: [] Trach [] Bi-PAP [] CPAP  [x] Nasal cannula  [x] Liters: 5    [] Wound Care:   [] Pressure ulcers (stage and location)   [] Wound vac   [] Wound or incision care    [] Pain Management (level of pain, meds):     [] Incontinence Bladder [] [place]  Insertion date:   [] Incontinence Bowel    [x] Last bowel movement: 2/16/22    Substance use history: [x] Yes  [] No   [] Tobacco  [x] Alcohol (current)  [] Other     [] Ethnic  [] Cultural  [] Spiritual  [] Language [] Needs  [] Other than English  [] Hearing Impaired  [] Visually Impaired  [] Speaking Impaired  [] Blind  [] Special equipment:  [] Devices/Splints  [] Type   [] Brace   [] Type  [] Bariatric bed  [] Extra wide commode  [] Extra wide wheelchair [] Extra wide walker  [] Lonnie walker  [] Lonnie wheelchair  [] Transfer lift    [] Other equipment    FUNCTIONAL STATUS PT / Conda Fanning / [place] Good:  FIM / EVAL Discipline Initial: 2/14/22 Follow Up: 2/15/22 Current:    Eating OT Minimal assist Set up    Grooming OT Moderate assist Minimal assist    Bathing OT Dependent Moderate assist    Dressing Upper Extremity OT Moderate assist Minimal assist    Dressing Lower Extremity OT Dependent Moderate assist    Toileting OT NT (urinary catheter) Minimal assist    Toilet Transfers OT NT NT    Tub/Shower Transfers OT NT NT    Homemaking OT NT NT    Bed Mobility PT Moderate assist Minimal assist    Bed/Wheelchair Transfers PT Moderate assist Minimal assist    Locomotion Walk / Wheelchair  Device:  Distance: PT Minimal assist  70ft [employer] Minimal assist  90ft [employer]    Endurance PT - -    Expression SP - -    Social Interaction SP - -    Problem Solving SP Fair - Fair    Memory SP Fair Fair    Comprehension SP Fair - Fair -    Swallowing SP Good Good    Bowel Management NSG Continent Continent    Bladder Management NSG Urinary catheter Continent      Comments on Functional Status: Able to tolerate 3 hours of therapy per day split into four 45 minute sessions. [x] Able to participate a minimum of 3 hours per day of therapy intervention.     Required treatments/services: [x] Rehabilitation nursing [] Dietitian / nurtition                 [x] Case management  [] Respiratory Therapy      [x] Social work   [] Other     Required Therapy:  Therapy Hours per Day Days per Week Therapeutic Interventions Required   [x] Physical Therapy 1 5-7 Gait, transfers, Safety, strength, education, endurance   [x] Occupational Therapy 1 5-7 ADLs, IADLs, Safety, strength, education, endurance   [x] Speech Pathology 1 5-7 Speech, swallow, cognition, safety   [] Prosthetics / Orthotics       []         Anticipated Discharge Plan:   Anticipated DME Needs:  [x] Home     [] Commode   [] Alone    [] Wheelchair   [x] Supervised    [] Walker   [] Assist    [] Oxygen        [] Hospital Bed  [] Assisted Living    [] Ramp        [x] To Be Determined    Anticipated Home Health Services:  Anticipated Outpatient Services:  [] PT       [] PT  [] OT      [] OT  [] Speech     [] Speech  [] Nursing     [] Dialysis  [] Aide      [x] To Be Determined  [x] To Be Determined    Anticipated support group:  [] Amputation  [] Multiple Sclerosis  [] Stroke  [] Brain Injury  [] Spinal cord injury  [] Other    Barriers to discharge: Impaired self care, impaired mobility    Discharge Support: [] Patient lives alone and does not have a caregiver available     [x] Patient has a caregiver available     [x] Discharge plan has been verified with patient's caregiver      [x] Caregiver is in agreement with the discharge plan      Expected functional status for safe discharge: Modified independent    Patient/support person goals: Go home    Expected length of stay: 1-2 weeks    Discussed expected length of stay and agreeable to IRF plan: [x] Yes   [] No    Impairment Group Category: 2.1    Etiological Diagnosis: Metabolic Encephalopathy    Primary Rehabilitation Diagnosis: Metabolic Encephalopathy    Electronically signed by Garth Varghese RN on 2/17/2022 at 8:27 AM    Prescreen completed __________________________________ (signature of prescreener)    Date:   2/17/22 Time:  0830    JUSTIFICATION FOR ADMISSION TO ACUTE REHABILITATION:  Patient has suffered decline in functional abilities for gait, transfers, speech, swallowing, cognition,  ADL's and IADL's as well as endurance. Patient has functional deficits requiring intensive therapy across multiple disciplines in order to return home safely. Patient will need physician oversight for respiratory issues, abnormal vital signs, nutritional and hydration status, safety issues, medications and therapy modalities. PT, OT and speech will work on deficits as noted in evaluations. Case management and social work will provide services for DME and management of a safe discharge home.       RECOMMEND LEVEL OF CARE  Recommend inpatient rehabilitation: [x] Yes   [] No  If no indicate reason:  [] Functional level too high  [] Unmotivated  [] No insurance carrier approval [] Unlikely to return to community  [] No medical necessity  [] Patient or family chose other facility  [] Too medically complex  [] Inadequate discharge plan  [] Rehabilitation bed unavailable [] Functional level too low  [] patient or family refused ARU    If patient not accepted for IRF admission, recommended level of care:  [] Subacute Rehabilitation Facility  [] [address]  [] [place]   [] Home Care  [] Other      [] LTAC       Physician Assigned:  [] Dr. Casimer Maxwell         [x] Dr. Luis Manuel Raphael              [] Dr. Rebekah [place] [] Dr. Neomia Ricci  [] Dr. Gary Coho:    ____________________________________________________________________  ____________________________________________________________________  ____________________________________________________________________  ____________________________________________________________________  ____________________________________________________________________      Physician Signature:_____________________________________    Print Signature:_________________________________________    Date:   2/17/22  Time:    8359

## 2022-02-16 NOTE — PROGRESS NOTES
Chief Complaint:  Hematemesis     Subjective:    No complaints today, no abd pain    Objective:    /68   Pulse 91   Temp 98 °F (36.7 °C) (Axillary)   Resp 22   Ht 5' 8\" (1.727 m)   Wt 257 lb 11.2 oz (116.9 kg)   SpO2 90%   BMI 39.18 kg/m²     Current medications that patient is taking have been reviewed.     General appearance: Nontoxic appearing man  HEENT: AT/NC, MMM  Neck: FROM, supple  Lungs: Clear to auscultation anteriorly, WOB normal  CV: RRR, no MRGs  Abdomen: Soft, a bit more protuberant than before, non-tender; no masses or HSM, +BS  Extremities: No peripheral edema or digital cyanosis  Skin: Jaundiced  Psych: Calm and cooperative  Neuro: A&Ox3, no asterixis    Labs:  CBC with Differential:    Lab Results   Component Value Date    WBC 7.2 02/16/2022    RBC 2.36 02/16/2022    HGB 7.2 02/16/2022    HCT 22.4 02/16/2022     02/16/2022    MCV 94.9 02/16/2022    MCH 30.5 02/16/2022    MCHC 32.1 02/16/2022    RDW 20.0 02/16/2022    LYMPHOPCT 3.2 02/07/2022    MONOPCT 8.0 02/07/2022    BASOPCT 0.1 02/07/2022    MONOSABS 0.73 02/07/2022    LYMPHSABS 0.29 02/07/2022    EOSABS 0.00 02/07/2022    BASOSABS 0.01 02/07/2022     CMP:    Lab Results   Component Value Date     02/16/2022    K 3.9 02/16/2022    CL 93 02/16/2022    CO2 24 02/16/2022    BUN 19 02/16/2022    CREATININE 0.9 02/16/2022    GFRAA >60 02/16/2022    LABGLOM >60 02/16/2022    GLUCOSE 76 02/16/2022    PROT 6.2 02/16/2022    LABALBU 2.8 02/16/2022    CALCIUM 7.4 02/16/2022    BILITOT 16.7 02/16/2022    ALKPHOS 110 02/16/2022     02/16/2022     02/16/2022          Assessment/Plan:  Principal Problem:    Hematemesis  Active Problems:    Bleeding esophageal varices (HCC)    Alcohol abuse    Acute blood loss anemia    Pulmonary mass    Hyperkalemia    Morbid obesity with BMI of 40.0-44.9, adult (HCC)    Transaminitis    Shock liver    Acute CHF (congestive heart failure) ([facility] [address]  Resolved Problems:    * No resolved hospital problems. *       Severe acute liver injury. LFTs continue to improve. Bili rising still, though maybe nearing peak.   INR stable at 2.3    Hemoglobin stabilized    Continue nadolol    Recheck CXR today, probably needs more Lasix    Continue thiamine, folic acid, multivitamin    Continue lactulose and rifaximin, no signs of HE right now    PPI    DVT prophylaxis: UFH  Requires continued inpatient level of care     Harish Zamorano MD    10:00 AM  2/16/2022

## 2022-02-17 LAB
ALBUMIN SERPL-MCNC: 3.2 G/DL (ref 3.5–5.2)
ALP BLD-CCNC: 121 U/L (ref 40–129)
ALT SERPL-CCNC: 101 U/L (ref 0–40)
AMMONIA: 48 UMOL/L (ref 16–60)
ANION GAP SERPL CALCULATED.3IONS-SCNC: 12 MMOL/L (ref 7–16)
AST SERPL-CCNC: 167 U/L (ref 0–39)
BILIRUB SERPL-MCNC: 21 MG/DL (ref 0–1.2)
BUN BLDV-MCNC: 21 MG/DL (ref 6–20)
CALCIUM IONIZED: 1.12 MMOL/L (ref 1.15–1.33)
CALCIUM SERPL-MCNC: 8.4 MG/DL (ref 8.6–10.2)
CHLORIDE BLD-SCNC: 93 MMOL/L (ref 98–107)
CO2: 25 MMOL/L (ref 22–29)
CREAT SERPL-MCNC: 0.9 MG/DL (ref 0.7–1.2)
GFR AFRICAN AMERICAN: >60
GFR NON-AFRICAN AMERICAN: >60 ML/MIN/1.73
GLUCOSE BLD-MCNC: 85 MG/DL (ref 74–99)
HCT VFR BLD CALC: 25.2 % (ref 37–54)
HEMOGLOBIN: 8.3 G/DL (ref 12.5–16.5)
INR BLD: 2.2
MAGNESIUM: 2.3 MG/DL (ref 1.6–2.6)
MCH RBC QN AUTO: 31.3 PG (ref 26–35)
MCHC RBC AUTO-ENTMCNC: 32.9 % (ref 32–34.5)
MCV RBC AUTO: 95.1 FL (ref 80–99.9)
PDW BLD-RTO: 20.7 FL (ref 11.5–15)
PHOSPHORUS: 3.5 MG/DL (ref 2.5–4.5)
PLATELET # BLD: 213 E9/L (ref 130–450)
PMV BLD AUTO: 11.9 FL (ref 7–12)
POTASSIUM REFLEX MAGNESIUM: 3.8 MMOL/L (ref 3.5–5)
PROTHROMBIN TIME: 24.2 SEC (ref 9.3–12.4)
RBC # BLD: 2.65 E12/L (ref 3.8–5.8)
SODIUM BLD-SCNC: 130 MMOL/L (ref 132–146)
TOTAL PROTEIN: 7.1 G/DL (ref 6.4–8.3)
WBC # BLD: 12.2 E9/L (ref 4.5–11.5)

## 2022-02-17 PROCEDURE — 2700000000 HC OXYGEN THERAPY PER DAY

## 2022-02-17 PROCEDURE — 82140 ASSAY OF AMMONIA: CPT

## 2022-02-17 PROCEDURE — 6370000000 HC RX 637 (ALT 250 FOR IP): Performed by: SURGERY

## 2022-02-17 PROCEDURE — 1200000000 HC SEMI PRIVATE

## 2022-02-17 PROCEDURE — C9113 INJ PANTOPRAZOLE SODIUM, VIA: HCPCS | Performed by: INTERNAL MEDICINE

## 2022-02-17 PROCEDURE — 2580000003 HC RX 258: Performed by: PHYSICIAN ASSISTANT

## 2022-02-17 PROCEDURE — 85610 PROTHROMBIN TIME: CPT

## 2022-02-17 PROCEDURE — 80053 COMPREHEN METABOLIC PANEL: CPT

## 2022-02-17 PROCEDURE — 6360000002 HC RX W HCPCS: Performed by: SURGERY

## 2022-02-17 PROCEDURE — 94668 MNPJ CHEST WALL SBSQ: CPT

## 2022-02-17 PROCEDURE — 82330 ASSAY OF CALCIUM: CPT

## 2022-02-17 PROCEDURE — 6370000000 HC RX 637 (ALT 250 FOR IP): Performed by: STUDENT IN AN ORGANIZED HEALTH CARE EDUCATION/TRAINING PROGRAM

## 2022-02-17 PROCEDURE — 6370000000 HC RX 637 (ALT 250 FOR IP): Performed by: INTERNAL MEDICINE

## 2022-02-17 PROCEDURE — 83735 ASSAY OF MAGNESIUM: CPT

## 2022-02-17 PROCEDURE — 36415 COLL VENOUS BLD VENIPUNCTURE: CPT

## 2022-02-17 PROCEDURE — 84100 ASSAY OF PHOSPHORUS: CPT

## 2022-02-17 PROCEDURE — 94640 AIRWAY INHALATION TREATMENT: CPT

## 2022-02-17 PROCEDURE — 6360000002 HC RX W HCPCS: Performed by: INTERNAL MEDICINE

## 2022-02-17 PROCEDURE — 85027 COMPLETE CBC AUTOMATED: CPT

## 2022-02-17 RX ORDER — FUROSEMIDE 40 MG/1
20 TABLET ORAL DAILY
Status: DISCONTINUED | OUTPATIENT
Start: 2022-02-17 | End: 2022-02-18

## 2022-02-17 RX ORDER — PANTOPRAZOLE SODIUM 40 MG/1
40 TABLET, DELAYED RELEASE ORAL
Status: DISCONTINUED | OUTPATIENT
Start: 2022-02-17 | End: 2022-02-19 | Stop reason: SDUPTHER

## 2022-02-17 RX ADMIN — IPRATROPIUM BROMIDE AND ALBUTEROL SULFATE 1 AMPULE: .5; 2.5 SOLUTION RESPIRATORY (INHALATION) at 08:18

## 2022-02-17 RX ADMIN — CHLORHEXIDINE GLUCONATE 0.12% ORAL RINSE 15 ML: 1.2 LIQUID ORAL at 08:20

## 2022-02-17 RX ADMIN — Medication 10 ML: at 08:20

## 2022-02-17 RX ADMIN — IPRATROPIUM BROMIDE AND ALBUTEROL SULFATE 1 AMPULE: .5; 2.5 SOLUTION RESPIRATORY (INHALATION) at 12:49

## 2022-02-17 RX ADMIN — LACTULOSE 20 G: 10 SOLUTION ORAL at 21:42

## 2022-02-17 RX ADMIN — Medication 500 MG: at 08:20

## 2022-02-17 RX ADMIN — Medication 6 MG: at 21:42

## 2022-02-17 RX ADMIN — MULTIVITAMIN 15 ML: LIQUID ORAL at 08:21

## 2022-02-17 RX ADMIN — HEPARIN SODIUM 5000 UNITS: 10000 INJECTION INTRAVENOUS; SUBCUTANEOUS at 05:58

## 2022-02-17 RX ADMIN — Medication 10 ML: at 21:45

## 2022-02-17 RX ADMIN — Medication 100 MG: at 08:20

## 2022-02-17 RX ADMIN — IPRATROPIUM BROMIDE AND ALBUTEROL SULFATE 1 AMPULE: .5; 2.5 SOLUTION RESPIRATORY (INHALATION) at 16:06

## 2022-02-17 RX ADMIN — NADOLOL 20 MG: 20 TABLET ORAL at 08:21

## 2022-02-17 RX ADMIN — LACTULOSE 20 G: 10 SOLUTION ORAL at 12:31

## 2022-02-17 RX ADMIN — RIFAXIMIN 550 MG: 550 TABLET ORAL at 08:21

## 2022-02-17 RX ADMIN — FOLIC ACID 1 MG: 1 TABLET ORAL at 08:21

## 2022-02-17 RX ADMIN — Medication 500 MG: at 16:54

## 2022-02-17 RX ADMIN — HEPARIN SODIUM 5000 UNITS: 10000 INJECTION INTRAVENOUS; SUBCUTANEOUS at 12:32

## 2022-02-17 RX ADMIN — CHLORHEXIDINE GLUCONATE 0.12% ORAL RINSE 15 ML: 1.2 LIQUID ORAL at 21:42

## 2022-02-17 RX ADMIN — PANTOPRAZOLE SODIUM 40 MG: 40 TABLET, DELAYED RELEASE ORAL at 16:53

## 2022-02-17 RX ADMIN — FUROSEMIDE 20 MG: 40 TABLET ORAL at 12:32

## 2022-02-17 RX ADMIN — Medication 500 MG: at 12:32

## 2022-02-17 RX ADMIN — Medication 500 MG: at 21:41

## 2022-02-17 RX ADMIN — IPRATROPIUM BROMIDE AND ALBUTEROL SULFATE 1 AMPULE: .5; 2.5 SOLUTION RESPIRATORY (INHALATION) at 20:28

## 2022-02-17 RX ADMIN — PANTOPRAZOLE SODIUM 40 MG: 40 INJECTION, POWDER, FOR SOLUTION INTRAVENOUS at 08:20

## 2022-02-17 ASSESSMENT — PAIN SCALES - GENERAL
PAINLEVEL_OUTOF10: 0

## 2022-02-17 NOTE — PROGRESS NOTES
Progress Note  Date:2022       Room:8419/8419-B  Patient Name:Joselo Castellanos     YOB: 1975     Age:46 y.o. Subjective    Subjective:  Symptoms:  Stable. He reports weakness. Diet:  Adequate intake. Activity level: Impaired due to weakness. Pain:  He reports no pain. Review of Systems   Neurological: Positive for weakness. Objective         Vitals Last 24 Hours:  TEMPERATURE:  Temp  Av.8 °F (36.6 °C)  Min: 97.1 °F (36.2 °C)  Max: 98.7 °F (37.1 °C)  RESPIRATIONS RANGE: Resp  Av.7  Min: 16  Max: 26  PULSE OXIMETRY RANGE: SpO2  Av.1 %  Min: 85 %  Max: 99 %  PULSE RANGE: Pulse  Av.7  Min: 72  Max: 86  BLOOD PRESSURE RANGE: Systolic (89KBH), TSJ:654 , Min:91 , TZU:072   ; Diastolic (84JFE), VP, Min:50, Max:75    I/O (24Hr): Intake/Output Summary (Last 24 hours) at 2022 1024  Last data filed at 2022 0818  Gross per 24 hour   Intake 1070 ml   Output 1083 ml   Net -13 ml     Objective:  General Appearance: In no acute distress. Vital signs: (most recent): Blood pressure 113/67, pulse 77, temperature 98.6 °F (37 °C), temperature source Temporal, resp. rate 18, height 5' 8\" (1.727 m), weight 257 lb 11.5 oz (116.9 kg), SpO2 94 %. Vital signs are normal.    Output: Producing urine and producing stool. Lungs:  Normal effort and normal respiratory rate. Breath sounds clear to auscultation. Heart: Normal rate. Regular rhythm. S1 normal.    Abdomen: Abdomen is distended. Hypoactive bowel sounds. Extremities: Normal range of motion. Neurological: Patient is alert.       Labs/Imaging/Diagnostics    Labs:  CBC:  Recent Labs     02/15/22  0520 22  0420 22  0515   WBC 6.3 7.2 12.2*   RBC 2.46* 2.36* 2.65*   HGB 7.6* 7.2* 8.3*   HCT 23.3* 22.4* 25.2*   MCV 94.7 94.9 95.1   RDW 19.3* 20.0* 20.7*    160 213     CHEMISTRIES:  Recent Labs     02/15/22  0520 22  0420 22  0515    128* 130*   K 3.9 3.9 3.8    93* 93*   CO2 26 24 25   BUN 12 19 21*   CREATININE 0.6* 0.9 0.9   GLUCOSE 74 76 85   PHOS 2.8 2.7 3.5   MG 1.9 1.9 2.3     PT/INR:  Recent Labs     02/15/22  0520 02/16/22 0420 02/17/22  0515   PROTIME 25.1* 25.3* 24.2*   INR 2.3 2.3 2.2     APTT:No results for input(s): APTT in the last 72 hours. LIVER PROFILE:  Recent Labs     02/15/22  0520 02/16/22  0420 02/17/22  0515   * 159* 167*   * 106* 101*   BILIDIR  --  11.8*  --    BILITOT 15.5* 16.7* 21.0*   ALKPHOS 114 110 121       Imaging Last 24 Hours:  XR CHEST PORTABLE    Result Date: 2/16/2022  EXAMINATION: ONE XRAY VIEW OF THE CHEST PORTABLE UPRIGHT 2/16/2022 1:23 pm COMPARISON: 02/15/2022 HISTORY: ORDERING SYSTEM PROVIDED HISTORY: reassess chf TECHNOLOGIST PROVIDED HISTORY: Reason for exam:->reassess chf What reading provider will be dictating this exam?->CRC FINDINGS: Cardiomegaly, unchanged. Both lungs show mildly improved aeration and partial clearing compatible with improving edema. Asymmetric hazy opacity remains at the right mid and right lower lung zones compatible with residual pulmonary edema and/or infiltrate. No significant pleural effusion. No pneumothorax. The left IJ central venous line is stable in position. Mildly improved aeration and partial clearing of both lungs compatible with improving pulmonary edema. Right lung asymmetric opacity remains. US ABDOMEN LIMITED Specify organ? LIVER    Result Date: 2/15/2022  EXAMINATION: RIGHT UPPER QUADRANT ULTRASOUND 2/15/2022 9:22 am COMPARISON: None. TECHNIQUE: Images were obtained of all 4 abdominal quadrants to assess for ascites utilizing a a curved array transducer. Grayscale images were saved.  HISTORY: ORDERING SYSTEM PROVIDED HISTORY: assess for ascites TECHNOLOGIST PROVIDED HISTORY: Reason for exam:->assess for ascites Specify organ?->LIVER What reading provider will be dictating this exam?->CRC FINDINGS: Imaging in all 4 quadrants revealed no evidence of abdominal ascites. No abnormal masses identified. Liver appears to have a heterogeneous echotexture. Hepatic steatosis noted on previous CT of 02/07/2022. No evidence of abdominal ascites. Assessment//Plan           Hospital Problems           Last Modified POA    * (Principal) Hematemesis 2/7/2022 Yes    Bleeding esophageal varices ([facility] 2/7/2022 Yes    Alcohol abuse 2/7/2022 Yes    Acute blood loss anemia 2/7/2022 Yes    Pulmonary mass 2/7/2022 Yes    Hyperkalemia 2/7/2022 Yes    Morbid obesity with BMI of 40.0-44.9, adult ([facility] 2/7/2022 Yes    Transaminitis 2/7/2022 Yes    Shock liver 2/8/2022 Yes    Acute CHF (congestive heart failure) ([facility] 2/14/2022 Yes        Assessment:    Condition: In stable condition. (Encephalopathy). Plan:   Encourage ambulation. (Still confused  Approved to come to rehab  ?repeat EGD).        Electronically signed by Zach Chacon MD on 2/17/22 at 10:24 AM EST

## 2022-02-17 NOTE — CARE COORDINATION
Katie - ARU is accepting Pt today. Discharge Plan is ARU. SAM/JUANY to follow for discharge needs.    [employer] Rec[employer]inghailey Query, L.S.W.  [phone]

## 2022-02-17 NOTE — PROGRESS NOTES
Chief Complaint:  Hematemesis     Subjective:    No complaints today, no abd pain    Objective:    /67   Pulse 77   Temp 98.6 °F (37 °C) (Temporal)   Resp 18   Ht 5' 8\" (1.727 m)   Wt 257 lb 11.5 oz (116.9 kg)   SpO2 94%   BMI 39.19 kg/m²     Current medications that patient is taking have been reviewed.     General appearance: Nontoxic appearing man  HEENT: AT/NC, MMM  Neck: FROM, supple  Lungs: Clear to auscultation anteriorly, WOB normal  CV: RRR, no MRGs  Abdomen: Soft, a bit more protuberant than before, non-tender; no masses or HSM, +BS  Extremities: No peripheral edema or digital cyanosis  Skin: Very jaundiced  Psych: Calm and cooperative  Neuro: A&Ox3, no asterixis    Labs:  CBC with Differential:    Lab Results   Component Value Date    WBC 12.2 02/17/2022    RBC 2.65 02/17/2022    HGB 8.3 02/17/2022    HCT 25.2 02/17/2022     02/17/2022    MCV 95.1 02/17/2022    MCH 31.3 02/17/2022    MCHC 32.9 02/17/2022    RDW 20.7 02/17/2022    LYMPHOPCT 3.2 02/07/2022    MONOPCT 8.0 02/07/2022    BASOPCT 0.1 02/07/2022    MONOSABS 0.73 02/07/2022    LYMPHSABS 0.29 02/07/2022    EOSABS 0.00 02/07/2022    BASOSABS 0.01 02/07/2022     CMP:    Lab Results   Component Value Date     02/17/2022    K 3.8 02/17/2022    CL 93 02/17/2022    CO2 25 02/17/2022    BUN 21 02/17/2022    CREATININE 0.9 02/17/2022    GFRAA >60 02/17/2022    LABGLOM >60 02/17/2022    GLUCOSE 85 02/17/2022    PROT 7.1 02/17/2022    LABALBU 3.2 02/17/2022    CALCIUM 8.4 02/17/2022    BILITOT 21.0 02/17/2022    ALKPHOS 121 02/17/2022     02/17/2022     02/17/2022          Assessment/Plan:  Principal Problem:    Hematemesis  Active Problems:    Bleeding esophageal varices (HCC)    Alcohol abuse    Acute blood loss anemia    Pulmonary mass    Hyperkalemia    Morbid obesity with BMI of 40.0-44.9, adult (HCC)    Transaminitis    Shock liver    Acute CHF (congestive heart failure) ([facility]  Resolved Problems:    * No resolved hospital problems. *       Severe acute liver injury. LFTs continue to improve. Bili had been flattening out but big jump to 21 today. INR 2.2 stable. Not sure if ALI vs liver failure. Hopefully just an acute injury that he will recover from at least partially. Would not recommend going to rehab until bilirubin and INR trend is clearer. Case d/w GI. Hemoglobin stabilized    Continue nadolol    New CXR personally reviewed, clearing up, still mild pulmonary edema.   Start PO Lasix    Continue thiamine, folic acid, multivitamin    Continue lactulose and rifaximin, no signs of HE right now    PPI    DVT prophylaxis: UFH  Requires continued inpatient level of care     Eric Leyva MD    12:42 PM  2/17/2022

## 2022-02-17 NOTE — PLAN OF CARE
aspiration  2/17/2022 8486 by Annemarie Pierce, RN  Outcome: Met This Shift  2/16/2022 1953 by Karla Jane  Outcome: Met This Shift

## 2022-02-17 NOTE — PROGRESS NOTES
Called unit  Pee in regard's to Dr. Gabriele Miller note today regarding possibly holding ARU admission. Precert for ARU will not be good after today. We can continue to monitor patients labs at ARU. If he does not come today we will need updated therapy notes and will need to re-do precert, which means patient likely would not admit until next week if he is still functionally appropriate by then. Pee to check with Dr. Saadia Stauffer on ARU admission.     Shalini Potter RN, Pre-screener for Acute Rehab  P: [phone]

## 2022-02-17 NOTE — PROGRESS NOTES
Insurance authorization has been obtained for [facility]. Patient can admit to ARU today if medically cleared. Rapid COVID-19 test was completed yesterday with negative results. Patient will go to room 5510-B.     Shalini Potter RN, Pre-screener for Acute Rehab  P: [phone]

## 2022-02-17 NOTE — PROGRESS NOTES
Met with patient in his room, updated that he will be coming to [facility] ([facility] ARU today, given new room number and visiting hours. All questions answered at this time. Called patient's wife Everardo Penny and updated her as well. She will bring clothes and shoes in for patient this evening.     Mukesh Cruz RN, Pre-screener for Acute Rehab  P: [phone]

## 2022-02-18 LAB
ACANTHOCYTES: ABNORMAL
ALBUMIN SERPL-MCNC: 2.7 G/DL (ref 3.5–5.2)
ALP BLD-CCNC: 111 U/L (ref 40–129)
ALT SERPL-CCNC: 82 U/L (ref 0–40)
ANION GAP SERPL CALCULATED.3IONS-SCNC: 12 MMOL/L (ref 7–16)
ANISOCYTOSIS: ABNORMAL
AST SERPL-CCNC: 166 U/L (ref 0–39)
BASOPHILS ABSOLUTE: 0.14 E9/L (ref 0–0.2)
BASOPHILS RELATIVE PERCENT: 1.2 % (ref 0–2)
BILIRUB SERPL-MCNC: 20.1 MG/DL (ref 0–1.2)
BUN BLDV-MCNC: 24 MG/DL (ref 6–20)
CALCIUM SERPL-MCNC: 7.5 MG/DL (ref 8.6–10.2)
CHLORIDE BLD-SCNC: 96 MMOL/L (ref 98–107)
CO2: 22 MMOL/L (ref 22–29)
CREAT SERPL-MCNC: 1 MG/DL (ref 0.7–1.2)
EOSINOPHILS ABSOLUTE: 0.2 E9/L (ref 0.05–0.5)
EOSINOPHILS RELATIVE PERCENT: 1.7 % (ref 0–6)
GFR AFRICAN AMERICAN: >60
GFR NON-AFRICAN AMERICAN: >60 ML/MIN/1.73
GLUCOSE BLD-MCNC: 77 MG/DL (ref 74–99)
HCT VFR BLD CALC: 24.8 % (ref 37–54)
HEMOGLOBIN: 8.2 G/DL (ref 12.5–16.5)
HYPOCHROMIA: ABNORMAL
IMMATURE GRANULOCYTES #: 0.06 E9/L
IMMATURE GRANULOCYTES %: 0.5 % (ref 0–5)
INR BLD: 2.2
LYMPHOCYTES ABSOLUTE: 1 E9/L (ref 1.5–4)
LYMPHOCYTES RELATIVE PERCENT: 8.6 % (ref 20–42)
MCH RBC QN AUTO: 30.9 PG (ref 26–35)
MCHC RBC AUTO-ENTMCNC: 33.1 % (ref 32–34.5)
MCV RBC AUTO: 93.6 FL (ref 80–99.9)
MONOCYTES ABSOLUTE: 1.51 E9/L (ref 0.1–0.95)
MONOCYTES RELATIVE PERCENT: 13 % (ref 2–12)
NEUTROPHILS ABSOLUTE: 8.74 E9/L (ref 1.8–7.3)
NEUTROPHILS RELATIVE PERCENT: 75 % (ref 43–80)
OVALOCYTES: ABNORMAL
PDW BLD-RTO: 21.7 FL (ref 11.5–15)
PLATELET # BLD: 174 E9/L (ref 130–450)
PMV BLD AUTO: 13.3 FL (ref 7–12)
POIKILOCYTES: ABNORMAL
POLYCHROMASIA: ABNORMAL
POTASSIUM REFLEX MAGNESIUM: 3.7 MMOL/L (ref 3.5–5)
PROTHROMBIN TIME: 24.2 SEC (ref 9.3–12.4)
RBC # BLD: 2.65 E12/L (ref 3.8–5.8)
REASON FOR REJECTION: NORMAL
REJECTED TEST: NORMAL
SODIUM BLD-SCNC: 130 MMOL/L (ref 132–146)
TARGET CELLS: ABNORMAL
TOTAL PROTEIN: 6.5 G/DL (ref 6.4–8.3)
WBC # BLD: 11.7 E9/L (ref 4.5–11.5)

## 2022-02-18 PROCEDURE — 6360000002 HC RX W HCPCS: Performed by: INTERNAL MEDICINE

## 2022-02-18 PROCEDURE — 6370000000 HC RX 637 (ALT 250 FOR IP): Performed by: SURGERY

## 2022-02-18 PROCEDURE — 94640 AIRWAY INHALATION TREATMENT: CPT

## 2022-02-18 PROCEDURE — 6370000000 HC RX 637 (ALT 250 FOR IP): Performed by: INTERNAL MEDICINE

## 2022-02-18 PROCEDURE — 6370000000 HC RX 637 (ALT 250 FOR IP): Performed by: STUDENT IN AN ORGANIZED HEALTH CARE EDUCATION/TRAINING PROGRAM

## 2022-02-18 PROCEDURE — 36415 COLL VENOUS BLD VENIPUNCTURE: CPT

## 2022-02-18 PROCEDURE — 1200000000 HC SEMI PRIVATE

## 2022-02-18 PROCEDURE — 97535 SELF CARE MNGMENT TRAINING: CPT

## 2022-02-18 PROCEDURE — 85025 COMPLETE CBC W/AUTO DIFF WBC: CPT

## 2022-02-18 PROCEDURE — 2580000003 HC RX 258: Performed by: PHYSICIAN ASSISTANT

## 2022-02-18 PROCEDURE — 2700000000 HC OXYGEN THERAPY PER DAY

## 2022-02-18 PROCEDURE — 80053 COMPREHEN METABOLIC PANEL: CPT

## 2022-02-18 PROCEDURE — 85610 PROTHROMBIN TIME: CPT

## 2022-02-18 PROCEDURE — 97530 THERAPEUTIC ACTIVITIES: CPT

## 2022-02-18 RX ORDER — LACTULOSE 10 G/15ML
10 SOLUTION ORAL 2 TIMES DAILY
Status: DISCONTINUED | OUTPATIENT
Start: 2022-02-18 | End: 2022-02-22 | Stop reason: HOSPADM

## 2022-02-18 RX ORDER — LACTULOSE 10 G/15ML
15 SOLUTION ORAL 2 TIMES DAILY
Status: DISCONTINUED | OUTPATIENT
Start: 2022-02-18 | End: 2022-02-18

## 2022-02-18 RX ADMIN — RIFAXIMIN 550 MG: 550 TABLET ORAL at 20:12

## 2022-02-18 RX ADMIN — RIFAXIMIN 550 MG: 550 TABLET ORAL at 09:21

## 2022-02-18 RX ADMIN — Medication 100 MG: at 09:20

## 2022-02-18 RX ADMIN — IPRATROPIUM BROMIDE AND ALBUTEROL SULFATE 1 AMPULE: .5; 2.5 SOLUTION RESPIRATORY (INHALATION) at 11:44

## 2022-02-18 RX ADMIN — PANTOPRAZOLE SODIUM 40 MG: 40 TABLET, DELAYED RELEASE ORAL at 15:24

## 2022-02-18 RX ADMIN — CHLORHEXIDINE GLUCONATE 0.12% ORAL RINSE 15 ML: 1.2 LIQUID ORAL at 20:10

## 2022-02-18 RX ADMIN — MULTIVITAMIN 15 ML: LIQUID ORAL at 09:19

## 2022-02-18 RX ADMIN — Medication 500 MG: at 20:17

## 2022-02-18 RX ADMIN — IPRATROPIUM BROMIDE AND ALBUTEROL SULFATE 1 AMPULE: .5; 2.5 SOLUTION RESPIRATORY (INHALATION) at 15:49

## 2022-02-18 RX ADMIN — FOLIC ACID 1 MG: 1 TABLET ORAL at 09:20

## 2022-02-18 RX ADMIN — CHLORHEXIDINE GLUCONATE 0.12% ORAL RINSE 15 ML: 1.2 LIQUID ORAL at 09:20

## 2022-02-18 RX ADMIN — Medication 500 MG: at 13:01

## 2022-02-18 RX ADMIN — Medication 10 ML: at 20:13

## 2022-02-18 RX ADMIN — Medication 6 MG: at 20:12

## 2022-02-18 RX ADMIN — NADOLOL 20 MG: 20 TABLET ORAL at 09:20

## 2022-02-18 RX ADMIN — LACTULOSE 20 G: 10 SOLUTION ORAL at 09:19

## 2022-02-18 RX ADMIN — ENOXAPARIN SODIUM 40 MG: 100 INJECTION SUBCUTANEOUS at 09:21

## 2022-02-18 RX ADMIN — Medication 10 ML: at 09:21

## 2022-02-18 RX ADMIN — PANTOPRAZOLE SODIUM 40 MG: 40 TABLET, DELAYED RELEASE ORAL at 05:57

## 2022-02-18 RX ADMIN — LACTULOSE 10 G: 20 SOLUTION ORAL at 20:11

## 2022-02-18 RX ADMIN — Medication 500 MG: at 17:00

## 2022-02-18 RX ADMIN — Medication 500 MG: at 09:00

## 2022-02-18 RX ADMIN — IPRATROPIUM BROMIDE AND ALBUTEROL SULFATE 1 AMPULE: .5; 2.5 SOLUTION RESPIRATORY (INHALATION) at 20:31

## 2022-02-18 RX ADMIN — FUROSEMIDE 20 MG: 40 TABLET ORAL at 09:20

## 2022-02-18 ASSESSMENT — PAIN SCALES - GENERAL
PAINLEVEL_OUTOF10: 0
PAINLEVEL_OUTOF10: 0

## 2022-02-18 NOTE — PROGRESS NOTES
Patient is now too high functioning for ARU. Recommend home with Deirdre Olmedo at discharge.     Maranda Bartholomew RN, Pre-screener for Acute Rehab  P: [phone]

## 2022-02-18 NOTE — PROGRESS NOTES
OCCUPATIONAL THERAPY TREATMENT NOTE    [place] [address] [facility]  [address] [facility]       Date:2022                                                               Patient Name: Miki Doss  MRN: [id number]  : 1975  Room: [address] [place] [address]    Evaluating OT: Robina Russell OTR/L [id number]     Referring Provider: ALEJANDRA Frazier   Specific Provider Orders/Date: OT eval and treat (22)     Diagnosis: GI BLEED                          Bleeding esophageal varices     Surgery/Procedures:        EGD ESOPHAGOGASTRODUODENOSCOPY ESOPHAGEAL BANDING       Pertinent Medical History:  alcohol abuse;,newly diagnosed Cirrhosis       *Precautions:  Fall Risk,      Assessment of current deficits   [x]? Functional mobility          [x]? ADLs           [x]? Strength                  [x]? Cognition   [x]? Functional transfers        [x]? IADLs         [x]? Safety Awareness   [x]? Endurance   [x]? Fine Coordination           [x]? ROM           []? Vision/perception    []? Sensation     [x]? Gross Motor Coordination [x]? Balance    []? Delirium                  []?Motor Control     []?  Communication     OT PLAN OF CARE   OT POC based on physician orders, patient diagnosis and results of clinical assessment.        Frequency/Duration: 1-3 days/wk for 1-2 weeks PRN    Specific OT Treatment to include:   ADL retraining/adapted techniques and AE recommendations to increase functional independence within precautions                    Energy conservation techniques to improve tolerance for selfcare routine   Functional transfer/mobility training/DME recommendations for increased independence, safety and fall prevention         Patient/family education to increase safety and functional independence within precautions              Environmental modifications for safe mobility and completion of ADLs                           Cognitive retraining ex's to improve problem solving skills & safe participation in ADLs/IADLs  Sensory re-education techniques to improve extremity awareness, maintain skin integrity and improve hand function                             Visual/Perceptual retraining  to improve body awareness and safety during transfers and ADLs  Therapeutic activity to improve functional performance during ADLs/IADLs                                         Therapeutic exercise to improve tolerance and functional strength for ADLs   Balance retraining exercises/tasks for facilitation of postural control with dynamic challenges during ADLs . Positioning to improve functional independence  Neuromuscular re-education: facilitation of righting/equilibrium reactions, normalization muscle tone/facilitation active functional movement                      Delirium prevention/treatment        Recommended Adaptive Equipment: reporting having a shower chair at home      Home Living: Pt lives with wife and children  in a 2 floor plan with bed/bath on 2nd floor: flight with partial rail. Bathroom setup: tub/shower 2nd floor; half bath on first floor  Equipment owned: no DME     Prior Level of Function: IND with ADLs;  IND with IADLs. No device for ambulation.    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)     Pain Level:  no pain      Cognition: A&O: x 4, pleasant & cooperative, eager for rehab or home, slightly impulsive with movements, follows 2 step commands             Memory: fair+             Comprehension fair             Problem solving: fair              Judgement/safety: fair+     Functional Assessment:  AM-PAC Daily Activity Raw Score: 19/24    Initial Eval Status  Date: 2/14 Treatment Status  Date:  2/18/22 STGs = LTGs  Time frame: 7-14 days   Feeding Min A  (unsteady hands) to hold onto cup Indep  Up in chair for meals                       Valeri  while seated up in chair to increase activity tolerance         Grooming Mod A  (decreased safety regarding medical lines/tubes) SBA  Standing at sink with grooming tasks                       SBA   while standing sink level requiring AD as needed for balance and demonstrating G tolerance       UB dressing/bathing Mod A Set up  Pull over sweatshirt                       Min A         LB dressing/bathing Dep      Max A overall  using AE after instruction (seated)     Socks- Mod A   (reacher; sock aid) SBA  Bending over to sandy & tie shoes, sweat pants already in place                       Min A   using AE as needed for safe reach/ energy conservation        Toileting catheter SBA  Simulated                          Min A      Bed Mobility  Supine to sit:   Mod A+2     Sit to supine:   NT Mod Indep                       SBA  in prep of ADL tasks & transfers   Functional Transfers Sit to stand: Mod A     Stand to sit:   Mod A     SPT: Mod A SBA  From different surfaces                           S  sit<>stand/functional bathroom transfers using AD/DME as needed for balance and safety   Functional Mobility Mod A no device  (short/shuffled steps)  Min A WW SBA  No AD, household distance in hallway, slow & guarded pace due to mild unsteadiness                       S   functional/bathroom mobility using AD as needed & demonstrating G safety      Balance Sitting:     Static: Min A to SBA    Dynamic:Min A  Standing: Mod A Sitting: Indep/S  Standing: SBA  No AD, occasional sway but no LOB S dynamic sitting balance; S dynamic standing balance  during ADL tasks & transfers   Endurance/Activity Tolerance    F tolerance with light activity. Fair+  Moderate tasks, mild SOB 93-91% on RA G   tolerance with moderate activity/self care routine   Visual/  Perceptual Min cues for environmental awareness; safety    No deficits noted  Glasses donned                    Treatment: OT treatment provided this date:    Balance retraining: Performed standing balance ex's with instruction to facilitate righting reactions with postural changes during ADLS. No AD needed this date, but slow pace & guarded noted during mobility. ADL retraining: Instruction on adapted techniques/AE with pt reporting he is able to complete tasks with no AD at this time. Pt bending down to sandy & tie shoes. Sweat pants in place upon arrival, recommending seated to thread legs then standing to pull over hips with pt reporting good understanding. Functional transfer training:  Instruction on postural cues, hand placement/sequencing for safety to assist with balance and fall prevention during self care routine/bathroom transfers. Pt completed 5 sit to stand transfers from bedside chair with good results. Energy conservation: Education on breathing techniques, pacing, work simplification strategies for safety and energy conservation during self care tasks and activities of daily living. Comments: Upon arrival, patient supine in bed- agreeable to session. Pt demo Fair+ understanding of education/techniques. At end of session, patient left seated in chair to increase activity tolerance. Call light within reach, all lines and tubes intact. Pt instructed on use of call light for assistance and fall prevention. Overall, pt presents with decreased activity tolerance, dynamic balance, functional mobility limiting completion of ADLs and safe return home. Pt can benefit from continued skilled OT to increase safety, functional independence & quality of life. · Pt has made good progress towards set goals. · Continue with current plan of care       Time In: 10:00              Time Out: 10:25   Total Treatment Time: 25 minutes      Treatment Charges: Mins Units   Ther Ex  [id number]     Manual Therapy 05116     Thera Activities 03179 10 1   ADL/Home Mgt 35189 15 1   Neuro Re-ed 42788     Orthotic manage/training  62134     Non-Billable Time         Donna Mercedes.  [address] [place] [address], [address] [place] [address]

## 2022-02-18 NOTE — PLAN OF CARE
Problem: Skin Integrity:  Goal: Will show no infection signs and symptoms  Description: Will show no infection signs and symptoms  Outcome: Met This Shift  Goal: Absence of new skin breakdown  Description: Absence of new skin breakdown  Outcome: Met This Shift     Problem: Falls - Risk of:  Goal: Will remain free from falls  Description: Will remain free from falls  Outcome: Met This Shift  Goal: Absence of physical injury  Description: Absence of physical injury  Outcome: Met This Shift     Problem: Discharge Planning:  Goal: Participates in care planning  Description: Participates in care planning  Outcome: Met This Shift  Goal: Discharged to appropriate level of care  Description: Discharged to appropriate level of care  Outcome: Met This Shift     Problem: Airway Clearance - Ineffective:  Goal: Ability to maintain a clear airway will improve  Description: Ability to maintain a clear airway will improve  Outcome: Met This Shift

## 2022-02-18 NOTE — PROGRESS NOTES
Physical Therapy  Physical Therapy Treatment    Name: Kirill Patricio  : 1975  MRN: [id number]      Date of Service: 2022    Evaluating PT:  Katlyn Zuñiga, PT, DPT [id number]    Room #:  4832/6264-Q  Diagnosis:  Bleeding [facility] [I85.01]  PMHx/PSHx:  EtOH abuse  Procedure/Surgery:  22 EGD with banding and IR TIPS Insertion   Precautions:  Falls, O2, , Cognition  Equipment Needs:  TBD    SUBJECTIVE:    Pt lives with wife and children in a 2 story home. There are 8 steps and 1 rail to reach second level of home, where bedroom and bathroom are located. Pt ambulated with no device and independent PTA. OBJECTIVE:   Initial Evaluation  Date: 22 Treatment  Date: 22 Short Term/ Long Term   Goals   AM-PAC 6 Clicks 62/56 83/82    Was pt agreeable to Eval/treatment? Yes Yes    Does pt have pain?  No reported pain No reported pain     Bed Mobility  Rolling: NT  Supine to sit: ModA x2 with HOB elevated  Sit to supine: NT  Scooting: NT Rolling: NT  Supine to sit: SBA with HOB elevated  Sit to supine: SBA  Scooting: SBA Independent    Transfers Sit to stand: ModA from bed  Stand to sit: Yvrose  Stand pivot: NT Sit to stand: SBA  Stand to sit: SBA  Stand pivot: NT Independent   Ambulation   10 feet with ModA with HHA   70 feet x 2 reps with [employer] and Yvrose  400 feet with no device SBA >400 feet Independently    Stair negotiation: ascended and descended NT NT >8 steps with 1 rail Mod Independent    ROM BUE:  Defer to OT note  BLE:  WFL     Strength BUE:  Defer to OT note  BLE:  4/5  Increase by 1/3 MMT grade   Balance Sitting EOB:  Yvrose dynamic  Dynamic Standing:  Yvrose with [employer] Sitting EOB:  SBA   Dynamic Standing: SBA with no device Sitting EOB:  Independent   Dynamic Standing:  Independent     Pt is A & O x 4  Sensation:  Pt denies numbness and tingling to extremities  Edema:  Mild BLE pitting edema    Vitals:  SPO2 monitored throughout session on room air: 91-93%    Patient education  Pt educated on role of PT, safety during mobility    Patient response to education:   Pt verbalized understanding Pt demonstrated skill Pt requires further education in this area   yes yes yes     ASSESSMENT:    Comments:  Patient sitting EOB upon entry and agreeable to PT treatment. Patient returned to sitting in bed for questioning prior to mobility. Patient able to complete all mobility with no hands on assist. Ambulation in pepe with no device with mild unsteadiness throughout, but no LOB. SOB with activity that resolved with seated rest. Education provided on safety during mobility with verbalized understanding. Patient with decreased safety awareness during all mobility requiring cues and close stand by for safety. Patient left sitting in chair at end of session with all needs met. Treatment:  Patient practiced and was instructed in the following treatment:     Bed Mobility: VCs provided for sequencing and safety during mobility.  Transfer Training: Verbal and tactile cueing provided for sequencing and safety during mobility.  Gait Training: Ambulation with no AD and verbal cues for proper technique and safety. PLAN:    Patient is making good progress towards established goals. Will continue with current POC.       Time in  0958  Time out  1025    Total Treatment Time  27 minutes     CPT codes:  [] Gait training 13470 - minutes  [] Manual therapy 38825 - minutes  [x] Therapeutic activities 93814 27 minutes  [] Therapeutic exercises 18287 - minutes  [] Neuromuscular reeducation 36886 - minutes    Terrance Maldonado PT, DPT  [id number]

## 2022-02-18 NOTE — PLAN OF CARE
Problem: Skin Integrity:  Goal: Will show no infection signs and symptoms  Description: Will show no infection signs and symptoms  2/18/2022 1144 by Endy Oneil RN  Outcome: Met This Shift  2/18/2022 0208 by Adriel Plasencia RN  Outcome: Met This Shift  Goal: Absence of new skin breakdown  Description: Absence of new skin breakdown  2/18/2022 1144 by Endy Oneil RN  Outcome: Met This Shift  2/18/2022 0208 by Adriel Plasencia RN  Outcome: Met This Shift     Problem: Falls - Risk of:  Goal: Will remain free from falls  Description: Will remain free from falls  2/18/2022 1144 by Endy Oneil RN  Outcome: Met This Shift  2/18/2022 0208 by Adriel Plasencia RN  Outcome: Met This Shift  Goal: Absence of physical injury  Description: Absence of physical injury  2/18/2022 1144 by Endy Oneil RN  Outcome: Met This Shift  2/18/2022 0208 by Adriel Plasencia RN  Outcome: Met This Shift

## 2022-02-18 NOTE — PROGRESS NOTES
Chief Complaint:  Hematemesis     Subjective:    No abd pain. Kind of grumpy, indignant about irrelevant tasks. For example he demanded to know what the \"two little pills\" are and I explained to him the purpose of all of his meds. He did complain of 7 BM's yesterday so we'll reduce his lactulose. Objective:    /67   Pulse 75   Temp 97.4 °F (36.3 °C) (Temporal)   Resp 18   Ht 5' 8\" (1.727 m)   Wt 257 lb 11.5 oz (116.9 kg)   SpO2 96%   BMI 39.19 kg/m²     Current medications that patient is taking have been reviewed. General appearance: Nontoxic appearing man  HEENT: AT/NC, MMM  Neck: FROM, supple  Lungs: Clear to auscultation anteriorly, WOB normal  CV: RRR, no MRGs  Abdomen: Soft, a bit more protuberant than before, non-tender; no masses or HSM, +BS  Extremities: No peripheral edema or digital cyanosis  Skin: Very jaundiced  Psych: Irritable.   Neuro: A&Ox3, no asterixis    Labs:  CBC with Differential:    Lab Results   Component Value Date    WBC 11.7 02/18/2022    RBC 2.65 02/18/2022    HGB 8.2 02/18/2022    HCT 24.8 02/18/2022     02/18/2022    MCV 93.6 02/18/2022    MCH 30.9 02/18/2022    MCHC 33.1 02/18/2022    RDW 21.7 02/18/2022    LYMPHOPCT 8.6 02/18/2022    MONOPCT 13.0 02/18/2022    BASOPCT 1.2 02/18/2022    MONOSABS 1.51 02/18/2022    LYMPHSABS 1.00 02/18/2022    EOSABS 0.20 02/18/2022    BASOSABS 0.14 02/18/2022     CMP:    Lab Results   Component Value Date     02/18/2022    K 3.7 02/18/2022    CL 96 02/18/2022    CO2 22 02/18/2022    BUN 24 02/18/2022    CREATININE 1.0 02/18/2022    GFRAA >60 02/18/2022    LABGLOM >60 02/18/2022    GLUCOSE 77 02/18/2022    PROT 6.5 02/18/2022    LABALBU 2.7 02/18/2022    CALCIUM 7.5 02/18/2022    BILITOT 20.1 02/18/2022    ALKPHOS 111 02/18/2022     02/18/2022    ALT 82 02/18/2022          Assessment/Plan:  Principal Problem:    Hematemesis  Active Problems:    Bleeding esophageal varices (HCC)    Alcohol abuse    Acute blood loss anemia    Pulmonary mass    Hyperkalemia    Morbid obesity with BMI of 40.0-44.9, adult (HCC)    Transaminitis    Shock liver    Acute CHF (congestive heart failure) ([facility] [address]  Resolved Problems:    * No resolved hospital problems. *       MELD 30 --> 52.6% 3 month mortality. Possible liver transplant candidate if stays abstinent of alcohol, and if survives acute phase of liver injury (seems to be doing OK)    Severe acute liver injury. Bilirubin jumped to 21 yesterday, but is down to 20 today. Hopefully it has peaked and won't get any worse. INR 2.2    Hemoglobin stabilized    Continue nadolol    Hypoxemia resolved. Pt complains he has been urinating too much. Ok to stop the Lasix. No ascites on recent US Abd    Continue thiamine, folic acid, multivitamin    Reduce lactulose dose.   Continue rifaxamin    PPI    DVT prophylaxis: UFH  Requires continued inpatient level of care     Harish Zamorano MD    12:51 PM  2/18/2022

## 2022-02-18 NOTE — PROGRESS NOTES
I received a phone call from patient's wife Trish Lea in regards to why patient did not come to ARU yesterday and why he can't come now. I explained that Dr. Sierra Emery did not want patient to come to ARU due to lab work and for any further questions regarding patient's condition and plan of care I encouraged her to call the nurses station for updates and to ask for Dr. Sierra Emery to call her if she has questions. I also explained that the insurance authorization was only good yesterday and we would need to re-do the precert, but patient did very well with therapy this morning and is now too high functioning and will not be approved by insurance. Trish Lea verbalized her understanding. All questions were answered and all concerns were addressed at this time. Trish Lea states she will call the nurses station for updates.     Maranda Bartholomew RN, Pre-screener for Acute Rehab  P: [phone]

## 2022-02-18 NOTE — CARE COORDINATION
Met with patient to discuss discharge plan and pt/ot 17/19 respectively with evals. Also did call his wife and discussed ARU recommendation home with hhc. List left @ bedside for wife. Cm/sw to follow up for hhc co.will need hhc order @ discharge. Electronically signed by Syd Valentin RN on 2/18/2022 at 11:31 AM    Spoke to patient's mom and patient in room regarding pt evals and ARU note for home with hhc.Message to Dr Gretchen Arreola to call wife. Electronically signed by Syd Valentin RN on 2/18/2022 at 12:16 PM    Rechecked with patient for hhc choice- wife is coming in 5 PM. Cm/sw to follow. Electronically signed by Syd Valentin RN on 2/18/2022 at 3:26 PM

## 2022-02-19 LAB
ALBUMIN SERPL-MCNC: 2.6 G/DL (ref 3.5–5.2)
ALP BLD-CCNC: 112 U/L (ref 40–129)
ALT SERPL-CCNC: 73 U/L (ref 0–40)
ANION GAP SERPL CALCULATED.3IONS-SCNC: 11 MMOL/L (ref 7–16)
ANISOCYTOSIS: ABNORMAL
AST SERPL-CCNC: 161 U/L (ref 0–39)
BASOPHILS ABSOLUTE: 0.16 E9/L (ref 0–0.2)
BASOPHILS RELATIVE PERCENT: 1.4 % (ref 0–2)
BILIRUB SERPL-MCNC: 20.8 MG/DL (ref 0–1.2)
BUN BLDV-MCNC: 18 MG/DL (ref 6–20)
CALCIUM SERPL-MCNC: 7.5 MG/DL (ref 8.6–10.2)
CHLORIDE BLD-SCNC: 93 MMOL/L (ref 98–107)
CO2: 25 MMOL/L (ref 22–29)
CREAT SERPL-MCNC: 0.9 MG/DL (ref 0.7–1.2)
EOSINOPHILS ABSOLUTE: 0.18 E9/L (ref 0.05–0.5)
EOSINOPHILS RELATIVE PERCENT: 1.6 % (ref 0–6)
GFR AFRICAN AMERICAN: >60
GFR NON-AFRICAN AMERICAN: >60 ML/MIN/1.73
GLUCOSE BLD-MCNC: 94 MG/DL (ref 74–99)
HCT VFR BLD CALC: 25.5 % (ref 37–54)
HEMOGLOBIN: 8.4 G/DL (ref 12.5–16.5)
HYPOCHROMIA: ABNORMAL
IMMATURE GRANULOCYTES #: 0.05 E9/L
IMMATURE GRANULOCYTES %: 0.4 % (ref 0–5)
INR BLD: 1.9
LYMPHOCYTES ABSOLUTE: 1.11 E9/L (ref 1.5–4)
LYMPHOCYTES RELATIVE PERCENT: 9.9 % (ref 20–42)
MCH RBC QN AUTO: 30.9 PG (ref 26–35)
MCHC RBC AUTO-ENTMCNC: 32.9 % (ref 32–34.5)
MCV RBC AUTO: 93.8 FL (ref 80–99.9)
METER GLUCOSE: 100 MG/DL (ref 74–99)
METER GLUCOSE: 109 MG/DL (ref 74–99)
METER GLUCOSE: 96 MG/DL (ref 74–99)
MONOCYTES ABSOLUTE: 1.5 E9/L (ref 0.1–0.95)
MONOCYTES RELATIVE PERCENT: 13.4 % (ref 2–12)
NEUTROPHILS ABSOLUTE: 8.17 E9/L (ref 1.8–7.3)
NEUTROPHILS RELATIVE PERCENT: 73.3 % (ref 43–80)
OVALOCYTES: ABNORMAL
PDW BLD-RTO: 21.8 FL (ref 11.5–15)
PLATELET # BLD: 239 E9/L (ref 130–450)
PMV BLD AUTO: 12.2 FL (ref 7–12)
POIKILOCYTES: ABNORMAL
POLYCHROMASIA: ABNORMAL
POTASSIUM REFLEX MAGNESIUM: 3.9 MMOL/L (ref 3.5–5)
PROTHROMBIN TIME: 21.2 SEC (ref 9.3–12.4)
RBC # BLD: 2.72 E12/L (ref 3.8–5.8)
ROULEAUX: ABNORMAL
SCHISTOCYTES: ABNORMAL
SODIUM BLD-SCNC: 129 MMOL/L (ref 132–146)
TARGET CELLS: ABNORMAL
TEAR DROP CELLS: ABNORMAL
TOTAL PROTEIN: 6.5 G/DL (ref 6.4–8.3)
WBC # BLD: 11.2 E9/L (ref 4.5–11.5)

## 2022-02-19 PROCEDURE — 6370000000 HC RX 637 (ALT 250 FOR IP): Performed by: SURGERY

## 2022-02-19 PROCEDURE — 2700000000 HC OXYGEN THERAPY PER DAY

## 2022-02-19 PROCEDURE — 82962 GLUCOSE BLOOD TEST: CPT

## 2022-02-19 PROCEDURE — 6370000000 HC RX 637 (ALT 250 FOR IP): Performed by: INTERNAL MEDICINE

## 2022-02-19 PROCEDURE — 6370000000 HC RX 637 (ALT 250 FOR IP): Performed by: STUDENT IN AN ORGANIZED HEALTH CARE EDUCATION/TRAINING PROGRAM

## 2022-02-19 PROCEDURE — 85610 PROTHROMBIN TIME: CPT

## 2022-02-19 PROCEDURE — 36415 COLL VENOUS BLD VENIPUNCTURE: CPT

## 2022-02-19 PROCEDURE — 2580000003 HC RX 258: Performed by: STUDENT IN AN ORGANIZED HEALTH CARE EDUCATION/TRAINING PROGRAM

## 2022-02-19 PROCEDURE — 85025 COMPLETE CBC W/AUTO DIFF WBC: CPT

## 2022-02-19 PROCEDURE — 6360000002 HC RX W HCPCS: Performed by: INTERNAL MEDICINE

## 2022-02-19 PROCEDURE — 1200000000 HC SEMI PRIVATE

## 2022-02-19 PROCEDURE — 2580000003 HC RX 258: Performed by: PHYSICIAN ASSISTANT

## 2022-02-19 PROCEDURE — 94640 AIRWAY INHALATION TREATMENT: CPT

## 2022-02-19 PROCEDURE — 80053 COMPREHEN METABOLIC PANEL: CPT

## 2022-02-19 RX ORDER — PANTOPRAZOLE SODIUM 40 MG/1
40 TABLET, DELAYED RELEASE ORAL
Status: DISCONTINUED | OUTPATIENT
Start: 2022-02-20 | End: 2022-02-22 | Stop reason: HOSPADM

## 2022-02-19 RX ADMIN — Medication 10 ML: at 20:27

## 2022-02-19 RX ADMIN — IPRATROPIUM BROMIDE AND ALBUTEROL SULFATE 1 AMPULE: .5; 2.5 SOLUTION RESPIRATORY (INHALATION) at 16:54

## 2022-02-19 RX ADMIN — LACTULOSE 10 G: 20 SOLUTION ORAL at 09:01

## 2022-02-19 RX ADMIN — PANTOPRAZOLE SODIUM 40 MG: 40 TABLET, DELAYED RELEASE ORAL at 16:12

## 2022-02-19 RX ADMIN — LACTULOSE 10 G: 20 SOLUTION ORAL at 20:23

## 2022-02-19 RX ADMIN — MULTIVITAMIN 15 ML: LIQUID ORAL at 09:02

## 2022-02-19 RX ADMIN — Medication 500 MG: at 20:22

## 2022-02-19 RX ADMIN — NADOLOL 20 MG: 20 TABLET ORAL at 09:02

## 2022-02-19 RX ADMIN — IPRATROPIUM BROMIDE AND ALBUTEROL SULFATE 1 AMPULE: .5; 2.5 SOLUTION RESPIRATORY (INHALATION) at 11:32

## 2022-02-19 RX ADMIN — CHLORHEXIDINE GLUCONATE 0.12% ORAL RINSE 15 ML: 1.2 LIQUID ORAL at 20:23

## 2022-02-19 RX ADMIN — Medication 100 MG: at 09:01

## 2022-02-19 RX ADMIN — Medication 6 MG: at 20:22

## 2022-02-19 RX ADMIN — Medication 500 MG: at 16:12

## 2022-02-19 RX ADMIN — RIFAXIMIN 550 MG: 550 TABLET ORAL at 20:22

## 2022-02-19 RX ADMIN — Medication 500 MG: at 13:10

## 2022-02-19 RX ADMIN — Medication 500 MG: at 09:11

## 2022-02-19 RX ADMIN — RIFAXIMIN 550 MG: 550 TABLET ORAL at 09:02

## 2022-02-19 RX ADMIN — Medication 10 ML: at 09:01

## 2022-02-19 RX ADMIN — PANTOPRAZOLE SODIUM 40 MG: 40 TABLET, DELAYED RELEASE ORAL at 06:49

## 2022-02-19 RX ADMIN — CHLORHEXIDINE GLUCONATE 0.12% ORAL RINSE 15 ML: 1.2 LIQUID ORAL at 09:01

## 2022-02-19 RX ADMIN — FOLIC ACID 1 MG: 1 TABLET ORAL at 09:01

## 2022-02-19 RX ADMIN — IPRATROPIUM BROMIDE AND ALBUTEROL SULFATE 1 AMPULE: .5; 2.5 SOLUTION RESPIRATORY (INHALATION) at 20:54

## 2022-02-19 RX ADMIN — ENOXAPARIN SODIUM 40 MG: 100 INJECTION SUBCUTANEOUS at 09:01

## 2022-02-19 ASSESSMENT — PAIN SCALES - GENERAL
PAINLEVEL_OUTOF10: 0

## 2022-02-19 NOTE — PROGRESS NOTES
Chief Complaint:  Hematemesis     Subjective:    No abd pain. Goes back and forth re: his report of diarrhea. Sometimes complains it's too loose, sometimes not. Reports ~ 4 episodes yesterday    Objective:    /63   Pulse 73   Temp 97.9 °F (36.6 °C) (Temporal)   Resp 18   Ht 5' 8\" (1.727 m)   Wt 257 lb (116.6 kg)   SpO2 97%   BMI 39.08 kg/m²     Current medications that patient is taking have been reviewed. General appearance: Nontoxic appearing man  HEENT: AT/NC, MMM  Neck: FROM, supple  Lungs: Clear to auscultation anteriorly, WOB normal  CV: RRR, no MRGs  Abdomen: Soft, a bit protuberant but stable, non-tender; no masses or HSM, +BS  Extremities: No peripheral edema or digital cyanosis  Skin: Very jaundiced  Psych: Irritable.   Neuro: A&Ox3, no asterixis    Labs:  CBC with Differential:    Lab Results   Component Value Date    WBC 11.2 02/19/2022    RBC 2.72 02/19/2022    HGB 8.4 02/19/2022    HCT 25.5 02/19/2022     02/19/2022    MCV 93.8 02/19/2022    MCH 30.9 02/19/2022    MCHC 32.9 02/19/2022    RDW 21.8 02/19/2022    LYMPHOPCT 9.9 02/19/2022    MONOPCT 13.4 02/19/2022    BASOPCT 1.4 02/19/2022    MONOSABS 1.50 02/19/2022    LYMPHSABS 1.11 02/19/2022    EOSABS 0.18 02/19/2022    BASOSABS 0.16 02/19/2022     CMP:    Lab Results   Component Value Date     02/19/2022    K 3.9 02/19/2022    CL 93 02/19/2022    CO2 25 02/19/2022    BUN 18 02/19/2022    CREATININE 0.9 02/19/2022    GFRAA >60 02/19/2022    LABGLOM >60 02/19/2022    GLUCOSE 94 02/19/2022    PROT 6.5 02/19/2022    LABALBU 2.6 02/19/2022    CALCIUM 7.5 02/19/2022    BILITOT 20.8 02/19/2022    ALKPHOS 112 02/19/2022     02/19/2022    ALT 73 02/19/2022          Assessment/Plan:  Principal Problem:    Hematemesis  Active Problems:    Bleeding esophageal varices (HCC)    Alcohol abuse    Acute blood loss anemia    Pulmonary mass    Hyperkalemia    Morbid obesity with BMI of 40.0-44.9, adult (HCC)    Transaminitis Shock liver    Acute CHF (congestive heart failure) (HCC)  Resolved Problems:    * No resolved hospital problems. *       MELD 29 --> 19.6% 3 month mortality. Possible liver transplant candidate if stays abstinent of alcohol, and if survives acute phase of liver injury (seems to be doing OK)    Severe acute liver injury. Bilirubin stuck in the low 20's but encouraging that his INR has come down to 1.9    Hemoglobin stabilized    Continue nadolol    He's back on O2 but I'm not sure if it's necessary, no documentation of hypoxemia recently. OK to stay off Lasix    Continue thiamine, folic acid, multivitamin    Continue current lactulose dose. Hold if >3 BM's day.   Continue rifaxamin    PPI    DVT prophylaxis: UFH  Requires continued inpatient level of care     Luana Barrow MD    2:17 PM  2/19/2022

## 2022-02-19 NOTE — PLAN OF CARE
Problem: Skin Integrity:  Goal: Will show no infection signs and symptoms  Description: Will show no infection signs and symptoms  2/19/2022 0932 by Marialuisa Lanier RN  Outcome: Met This Shift  2/19/2022 0454 by Chloe Vance RN  Outcome: Met This Shift  Goal: Absence of new skin breakdown  Description: Absence of new skin breakdown  2/19/2022 0932 by Marialuisa Lanier RN  Outcome: Met This Shift  2/19/2022 0454 by Chloe Vance RN  Outcome: Met This Shift     Problem: Falls - Risk of:  Goal: Will remain free from falls  Description: Will remain free from falls  2/19/2022 0932 by Marialuisa Lanier RN  Outcome: Met This Shift  2/19/2022 0454 by Chloe Vance RN  Outcome: Met This Shift  Goal: Absence of physical injury  Description: Absence of physical injury  2/19/2022 0932 by Marialuisa Lanier RN  Outcome: Met This Shift  2/19/2022 0454 by Chloe Vance RN  Outcome: Met This Shift     Problem: Discharge Planning:  Goal: Participates in care planning  Description: Participates in care planning  Outcome: Met This Shift  Goal: Discharged to appropriate level of care  Description: Discharged to appropriate level of care  Outcome: Met This Shift     Problem: Airway Clearance - Ineffective:  Goal: Ability to maintain a clear airway will improve  Description: Ability to maintain a clear airway will improve  Outcome: Met This Shift     Problem: Anxiety/Stress:  Goal: Level of anxiety will decrease  Description: Level of anxiety will decrease  2/19/2022 0932 by Marialuisa Lanier RN  Outcome: Met This Shift  2/19/2022 0454 by Chloe Vance RN  Outcome: Met This Shift     Problem: Aspiration:  Goal: Absence of aspiration  Description: Absence of aspiration  Outcome: Met This Shift     Problem:  Bowel Function - Altered:  Goal: Bowel elimination is within specified parameters  Description: Bowel elimination is within specified parameters  Outcome: Met This Shift     Problem: Cardiac Output - Decreased:  Goal: Hemodynamic stability will improve  Description: Hemodynamic stability will improve  Outcome: Met This Shift     Problem: Fluid Volume - Imbalance:  Goal: Absence of imbalanced fluid volume signs and symptoms  Description: Absence of imbalanced fluid volume signs and symptoms  Outcome: Met This Shift     Problem: Gas Exchange - Impaired:  Goal: Levels of oxygenation will improve  Description: Levels of oxygenation will improve  Outcome: Met This Shift     Problem: Mental Status - Impaired:  Goal: Mental status will be restored to baseline  Description: Mental status will be restored to baseline  Outcome: Met This Shift     Problem: Nutrition Deficit:  Goal: Ability to achieve adequate nutritional intake will improve  Description: Ability to achieve adequate nutritional intake will improve  Outcome: Met This Shift     Problem: Pain:  Goal: Pain level will decrease  Description: Pain level will decrease  Outcome: Met This Shift  Goal: Recognizes and communicates pain  Description: Recognizes and communicates pain  Outcome: Met This Shift  Goal: Control of acute pain  Description: Control of acute pain  Outcome: Met This Shift  Goal: Control of chronic pain  Description: Control of chronic pain  Outcome: Met This Shift     Problem: Serum Glucose Level - Abnormal:  Goal: Ability to maintain appropriate glucose levels will improve to within specified parameters  Description: Ability to maintain appropriate glucose levels will improve to within specified parameters  Outcome: Met This Shift     Problem: Skin Integrity - Impaired:  Goal: Will show no infection signs and symptoms  Description: Will show no infection signs and symptoms  Outcome: Met This Shift  Goal: Absence of new skin breakdown  Description: Absence of new skin breakdown  Outcome: Met This Shift     Problem: Sleep Pattern Disturbance:  Goal: Appears well-rested  Description: Appears well-rested  Outcome: Met This Shift     Problem: Tissue Perfusion, Altered:  Goal: Circulatory function within specified parameters  Description: Circulatory function within specified parameters  Outcome: Met This Shift     Problem: Tissue Perfusion - Cardiopulmonary, Altered:  Goal: Absence of angina  Description: Absence of angina  Outcome: Met This Shift  Goal: Hemodynamic stability will improve  Description: Hemodynamic stability will improve  Outcome: Met This Shift     Problem: Pain:  Goal: Pain level will decrease  Description: Pain level will decrease  Outcome: Met This Shift  Goal: Control of acute pain  Description: Control of acute pain  Outcome: Met This Shift  Goal: Control of chronic pain  Description: Control of chronic pain  Outcome: Met This Shift     Problem: Musculor/Skeletal Functional Status  Goal: Highest potential functional level  Outcome: Met This Shift  Goal: Absence of falls  Outcome: Met This Shift

## 2022-02-20 LAB
ALBUMIN SERPL-MCNC: 2.4 G/DL (ref 3.5–5.2)
ALP BLD-CCNC: 102 U/L (ref 40–129)
ALT SERPL-CCNC: 65 U/L (ref 0–40)
ANION GAP SERPL CALCULATED.3IONS-SCNC: 11 MMOL/L (ref 7–16)
ANISOCYTOSIS: ABNORMAL
AST SERPL-CCNC: 170 U/L (ref 0–39)
BASOPHILS ABSOLUTE: 0.12 E9/L (ref 0–0.2)
BASOPHILS RELATIVE PERCENT: 1.2 % (ref 0–2)
BILIRUB SERPL-MCNC: 20.4 MG/DL (ref 0–1.2)
BUN BLDV-MCNC: 16 MG/DL (ref 6–20)
CALCIUM SERPL-MCNC: 7.3 MG/DL (ref 8.6–10.2)
CHLORIDE BLD-SCNC: 96 MMOL/L (ref 98–107)
CO2: 24 MMOL/L (ref 22–29)
CREAT SERPL-MCNC: 0.9 MG/DL (ref 0.7–1.2)
EOSINOPHILS ABSOLUTE: 0.18 E9/L (ref 0.05–0.5)
EOSINOPHILS RELATIVE PERCENT: 1.8 % (ref 0–6)
GFR AFRICAN AMERICAN: >60
GFR NON-AFRICAN AMERICAN: >60 ML/MIN/1.73
GLUCOSE BLD-MCNC: 73 MG/DL (ref 74–99)
HCT VFR BLD CALC: 25.3 % (ref 37–54)
HEMOGLOBIN: 8.4 G/DL (ref 12.5–16.5)
IMMATURE GRANULOCYTES #: 0.04 E9/L
IMMATURE GRANULOCYTES %: 0.4 % (ref 0–5)
INR BLD: 2
LYMPHOCYTES ABSOLUTE: 0.93 E9/L (ref 1.5–4)
LYMPHOCYTES RELATIVE PERCENT: 9.5 % (ref 20–42)
MCH RBC QN AUTO: 31 PG (ref 26–35)
MCHC RBC AUTO-ENTMCNC: 33.2 % (ref 32–34.5)
MCV RBC AUTO: 93.4 FL (ref 80–99.9)
METER GLUCOSE: 125 MG/DL (ref 74–99)
MONOCYTES ABSOLUTE: 1.3 E9/L (ref 0.1–0.95)
MONOCYTES RELATIVE PERCENT: 13.2 % (ref 2–12)
NEUTROPHILS ABSOLUTE: 7.25 E9/L (ref 1.8–7.3)
NEUTROPHILS RELATIVE PERCENT: 73.9 % (ref 43–80)
OVALOCYTES: ABNORMAL
PDW BLD-RTO: 22 FL (ref 11.5–15)
PLATELET # BLD: 215 E9/L (ref 130–450)
PMV BLD AUTO: 12.3 FL (ref 7–12)
POIKILOCYTES: ABNORMAL
POLYCHROMASIA: ABNORMAL
POTASSIUM REFLEX MAGNESIUM: 3.7 MMOL/L (ref 3.5–5)
PROTHROMBIN TIME: 22.1 SEC (ref 9.3–12.4)
RBC # BLD: 2.71 E12/L (ref 3.8–5.8)
SODIUM BLD-SCNC: 131 MMOL/L (ref 132–146)
TARGET CELLS: ABNORMAL
TOTAL PROTEIN: 6.2 G/DL (ref 6.4–8.3)
WBC # BLD: 9.8 E9/L (ref 4.5–11.5)

## 2022-02-20 PROCEDURE — 6370000000 HC RX 637 (ALT 250 FOR IP): Performed by: STUDENT IN AN ORGANIZED HEALTH CARE EDUCATION/TRAINING PROGRAM

## 2022-02-20 PROCEDURE — 85025 COMPLETE CBC W/AUTO DIFF WBC: CPT

## 2022-02-20 PROCEDURE — 1200000000 HC SEMI PRIVATE

## 2022-02-20 PROCEDURE — 36415 COLL VENOUS BLD VENIPUNCTURE: CPT

## 2022-02-20 PROCEDURE — 6360000002 HC RX W HCPCS: Performed by: INTERNAL MEDICINE

## 2022-02-20 PROCEDURE — 85610 PROTHROMBIN TIME: CPT

## 2022-02-20 PROCEDURE — 2700000000 HC OXYGEN THERAPY PER DAY

## 2022-02-20 PROCEDURE — 2580000003 HC RX 258: Performed by: STUDENT IN AN ORGANIZED HEALTH CARE EDUCATION/TRAINING PROGRAM

## 2022-02-20 PROCEDURE — 82962 GLUCOSE BLOOD TEST: CPT

## 2022-02-20 PROCEDURE — 6370000000 HC RX 637 (ALT 250 FOR IP): Performed by: INTERNAL MEDICINE

## 2022-02-20 PROCEDURE — 80053 COMPREHEN METABOLIC PANEL: CPT

## 2022-02-20 PROCEDURE — 94640 AIRWAY INHALATION TREATMENT: CPT

## 2022-02-20 RX ORDER — PANTOPRAZOLE SODIUM 20 MG/1
20 TABLET, DELAYED RELEASE ORAL 2 TIMES DAILY
Qty: 90 TABLET | Refills: 1 | Status: SHIPPED | OUTPATIENT
Start: 2022-02-20 | End: 2022-02-22 | Stop reason: HOSPADM

## 2022-02-20 RX ADMIN — Medication 10 ML: at 20:37

## 2022-02-20 RX ADMIN — RIFAXIMIN 550 MG: 550 TABLET ORAL at 20:36

## 2022-02-20 RX ADMIN — LACTULOSE 10 G: 20 SOLUTION ORAL at 09:12

## 2022-02-20 RX ADMIN — LACTULOSE 10 G: 20 SOLUTION ORAL at 20:35

## 2022-02-20 RX ADMIN — NADOLOL 20 MG: 20 TABLET ORAL at 09:12

## 2022-02-20 RX ADMIN — CHLORHEXIDINE GLUCONATE 0.12% ORAL RINSE 15 ML: 1.2 LIQUID ORAL at 09:12

## 2022-02-20 RX ADMIN — IPRATROPIUM BROMIDE AND ALBUTEROL SULFATE 1 AMPULE: .5; 2.5 SOLUTION RESPIRATORY (INHALATION) at 16:53

## 2022-02-20 RX ADMIN — Medication 500 MG: at 16:17

## 2022-02-20 RX ADMIN — FOLIC ACID 1 MG: 1 TABLET ORAL at 09:12

## 2022-02-20 RX ADMIN — Medication 6 MG: at 20:36

## 2022-02-20 RX ADMIN — PANTOPRAZOLE SODIUM 40 MG: 40 TABLET, DELAYED RELEASE ORAL at 05:25

## 2022-02-20 RX ADMIN — PANTOPRAZOLE SODIUM 40 MG: 40 TABLET, DELAYED RELEASE ORAL at 16:18

## 2022-02-20 RX ADMIN — IPRATROPIUM BROMIDE AND ALBUTEROL SULFATE 1 AMPULE: .5; 2.5 SOLUTION RESPIRATORY (INHALATION) at 11:43

## 2022-02-20 RX ADMIN — Medication 500 MG: at 12:02

## 2022-02-20 RX ADMIN — CHLORHEXIDINE GLUCONATE 0.12% ORAL RINSE 15 ML: 1.2 LIQUID ORAL at 20:35

## 2022-02-20 RX ADMIN — ENOXAPARIN SODIUM 40 MG: 100 INJECTION SUBCUTANEOUS at 09:11

## 2022-02-20 RX ADMIN — Medication 500 MG: at 09:11

## 2022-02-20 RX ADMIN — RIFAXIMIN 550 MG: 550 TABLET ORAL at 09:12

## 2022-02-20 RX ADMIN — Medication 500 MG: at 20:36

## 2022-02-20 RX ADMIN — Medication 10 ML: at 09:11

## 2022-02-20 RX ADMIN — MULTIVITAMIN 15 ML: LIQUID ORAL at 09:12

## 2022-02-20 RX ADMIN — Medication 100 MG: at 09:12

## 2022-02-20 ASSESSMENT — PAIN SCALES - GENERAL
PAINLEVEL_OUTOF10: 0

## 2022-02-20 NOTE — PROGRESS NOTES
I have had multiple discussions with Araceli Walls and Jesus regarding his recovery, MELD score and discharge. Seems plan is for DC home now rather than acute rehab. They are engaged in going home but need some education on discharge care and home PT and follow up. Will need GI and PCP follow up in 1-2 weeks with labs drawn prior to those appointments (CBC, BMP, Hepatic function panel and PT/INR, Ammonia). Home on PPI BID for stress GI PPx. Appreciate nutrition education. He needs to be on post-TIPS high protein diet and needs education regarding this to avoid postop hepatic encephalopathy. Will need information on alcoholism recovery options and treatment centers in the area he may qualify for. Hoping tomorrow Social work and Nutrition can sit with Jesus and Araceli Walls and relay these discharge plans.     Arin Garcia MD, MS  Minimally Invasive and Bariatric Surgery  [phone] (p)  2/20/2022  5:02 PM

## 2022-02-20 NOTE — PROGRESS NOTES
Nutrition Education    Met with pt per consult for low sodium diet education. Provided pt with and discussed low sodium diet as well as salt free seasoning handouts. Pt was receptive of information provided. · Verbally reviewed information with Patient  · Educated on low sodium diet. · Written educational materials provided. · Contact name and number provided.     Electronically signed by Sandra Mendes RD, LD on 2/20/22 at 3:21 PM EST    Contact: [phone]

## 2022-02-20 NOTE — PROGRESS NOTES
Chief Complaint:  Hematemesis     Subjective:    No abd pain. Diarrhea improving. No new complaints. Objective:    /75   Pulse 68   Temp 98.4 °F (36.9 °C) (Oral)   Resp 18   Ht 5' 8\" (1.727 m)   Wt 254 lb 6.6 oz (115.4 kg)   SpO2 93%   BMI 38.68 kg/m²     Current medications that patient is taking have been reviewed. General appearance: Nontoxic appearing man  HEENT: AT/NC, MMM  Neck: FROM, supple  Lungs: Clear to auscultation anteriorly, WOB normal  CV: RRR, no MRGs  Abdomen: Soft, a bit protuberant but stable, non-tender; no masses or HSM, +BS  Extremities: No peripheral edema or digital cyanosis  Skin: Very jaundiced  Psych: Irritable.   Neuro: A&Ox3, no asterixis    Labs:  CBC with Differential:    Lab Results   Component Value Date    WBC 9.8 02/20/2022    RBC 2.71 02/20/2022    HGB 8.4 02/20/2022    HCT 25.3 02/20/2022     02/20/2022    MCV 93.4 02/20/2022    MCH 31.0 02/20/2022    MCHC 33.2 02/20/2022    RDW 22.0 02/20/2022    LYMPHOPCT 9.5 02/20/2022    MONOPCT 13.2 02/20/2022    BASOPCT 1.2 02/20/2022    MONOSABS 1.30 02/20/2022    LYMPHSABS 0.93 02/20/2022    EOSABS 0.18 02/20/2022    BASOSABS 0.12 02/20/2022     CMP:    Lab Results   Component Value Date     02/20/2022    K 3.7 02/20/2022    CL 96 02/20/2022    CO2 24 02/20/2022    BUN 16 02/20/2022    CREATININE 0.9 02/20/2022    GFRAA >60 02/20/2022    LABGLOM >60 02/20/2022    GLUCOSE 73 02/20/2022    PROT 6.2 02/20/2022    LABALBU 2.4 02/20/2022    CALCIUM 7.3 02/20/2022    BILITOT 20.4 02/20/2022    ALKPHOS 102 02/20/2022     02/20/2022    ALT 65 02/20/2022          Assessment/Plan:  Principal Problem:    Hematemesis  Active Problems:    Bleeding esophageal varices (HCC)    Alcohol abuse    Acute blood loss anemia    Pulmonary mass    Hyperkalemia    Morbid obesity with BMI of 40.0-44.9, adult (HCC)    Transaminitis    Shock liver    Acute CHF (congestive heart failure) ([facility] [address]  Resolved Problems:    * No resolved hospital problems. *         Possible liver transplant candidate if stays abstinent of alcohol, and if survives acute phase of liver injury (seems to be doing OK)    Bili, INR all stuck pretty high. At least not worsening    Hemoglobin stabilized    Continue nadolol    Continue thiamine, folic acid, multivitamin    Continue current lactulose dose. Hold if >3 BM's day.   Continue rifaxamin    PPI    DVT prophylaxis: UFH  Requires continued inpatient level of care     Obed Heard MD    1:06 PM  2/20/2022

## 2022-02-20 NOTE — PLAN OF CARE
Problem: Skin Integrity:  Goal: Will show no infection signs and symptoms  Description: Will show no infection signs and symptoms  Outcome: Met This Shift  Goal: Absence of new skin breakdown  Description: Absence of new skin breakdown  2/20/2022 0947 by Chema Schulz RN  Outcome: Met This Shift  2/20/2022 0056 by Keely Burt RN  Outcome: Met This Shift     Problem: Falls - Risk of:  Goal: Will remain free from falls  Description: Will remain free from falls  2/20/2022 0947 by Chema Schulz RN  Outcome: Met This Shift  2/20/2022 0056 by Keely Burt RN  Outcome: Met This Shift  Goal: Absence of physical injury  Description: Absence of physical injury  Outcome: Met This Shift     Problem: Discharge Planning:  Goal: Participates in care planning  Description: Participates in care planning  Outcome: Met This Shift  Goal: Discharged to appropriate level of care  Description: Discharged to appropriate level of care  Outcome: Met This Shift     Problem: Airway Clearance - Ineffective:  Goal: Ability to maintain a clear airway will improve  Description: Ability to maintain a clear airway will improve  Outcome: Met This Shift     Problem: Anxiety/Stress:  Goal: Level of anxiety will decrease  Description: Level of anxiety will decrease  2/20/2022 0947 by Chema Schulz RN  Outcome: Met This Shift  2/20/2022 0056 by Keely Burt RN  Outcome: Not Met This Shift     Problem: Aspiration:  Goal: Absence of aspiration  Description: Absence of aspiration  Outcome: Met This Shift     Problem:  Bowel Function - Altered:  Goal: Bowel elimination is within specified parameters  Description: Bowel elimination is within specified parameters  Outcome: Met This Shift     Problem: Cardiac Output - Decreased:  Goal: Hemodynamic stability will improve  Description: Hemodynamic stability will improve  Outcome: Met This Shift     Problem: Fluid Volume - Imbalance:  Goal: Absence of imbalanced fluid volume signs and symptoms  Description: Absence of imbalanced fluid volume signs and symptoms  Outcome: Met This Shift     Problem: Gas Exchange - Impaired:  Goal: Levels of oxygenation will improve  Description: Levels of oxygenation will improve  Outcome: Met This Shift     Problem: Mental Status - Impaired:  Goal: Mental status will be restored to baseline  Description: Mental status will be restored to baseline  Outcome: Met This Shift     Problem: Nutrition Deficit:  Goal: Ability to achieve adequate nutritional intake will improve  Description: Ability to achieve adequate nutritional intake will improve  Outcome: Met This Shift     Problem: Pain:  Goal: Pain level will decrease  Description: Pain level will decrease  Outcome: Met This Shift  Goal: Recognizes and communicates pain  Description: Recognizes and communicates pain  Outcome: Met This Shift  Goal: Control of acute pain  Description: Control of acute pain  Outcome: Met This Shift  Goal: Control of chronic pain  Description: Control of chronic pain  Outcome: Met This Shift     Problem: Serum Glucose Level - Abnormal:  Goal: Ability to maintain appropriate glucose levels will improve to within specified parameters  Description: Ability to maintain appropriate glucose levels will improve to within specified parameters  Outcome: Met This Shift     Problem: Skin Integrity - Impaired:  Goal: Will show no infection signs and symptoms  Description: Will show no infection signs and symptoms  Outcome: Met This Shift  Goal: Absence of new skin breakdown  Description: Absence of new skin breakdown  Outcome: Met This Shift     Problem: Sleep Pattern Disturbance:  Goal: Appears well-rested  Description: Appears well-rested  Outcome: Met This Shift     Problem: Tissue Perfusion, Altered:  Goal: Circulatory function within specified parameters  Description: Circulatory function within specified parameters  Outcome: Met This Shift     Problem: Tissue Perfusion - Cardiopulmonary, Altered:  Goal: Absence of angina  Description: Absence of angina  Outcome: Met This Shift  Goal: Hemodynamic stability will improve  Description: Hemodynamic stability will improve  Outcome: Met This Shift     Problem: Pain:  Goal: Pain level will decrease  Description: Pain level will decrease  Outcome: Met This Shift  Goal: Control of acute pain  Description: Control of acute pain  Outcome: Met This Shift  Goal: Control of chronic pain  Description: Control of chronic pain  Outcome: Met This Shift     Problem: Musculor/Skeletal Functional Status  Goal: Highest potential functional level  Outcome: Met This Shift  Goal: Absence of falls  Outcome: Met This Shift     Problem: Fluid Volume:  Goal: Hemodynamic stability will improve  Description: Hemodynamic stability will improve  Outcome: Met This Shift  Goal: Ability to maintain a balanced intake and output will improve  Description: Ability to maintain a balanced intake and output will improve  Outcome: Met This Shift     Problem: Health Behavior:  Goal: Identification of resources available to assist in meeting health care needs will improve  Description: Identification of resources available to assist in meeting health care needs will improve  Outcome: Met This Shift     Problem: Respiratory:  Goal: Respiratory status will improve  Description: Respiratory status will improve  2/20/2022 0947 by Danay Joseph RN  Outcome: Met This Shift  2/20/2022 0413 by Michelle Cabral RN  Outcome: Met This Shift

## 2022-02-21 LAB
ALBUMIN SERPL-MCNC: 2.4 G/DL (ref 3.5–5.2)
ALP BLD-CCNC: 108 U/L (ref 40–129)
ALT SERPL-CCNC: 61 U/L (ref 0–40)
ANION GAP SERPL CALCULATED.3IONS-SCNC: 10 MMOL/L (ref 7–16)
ANISOCYTOSIS: ABNORMAL
AST SERPL-CCNC: 174 U/L (ref 0–39)
BASOPHILS ABSOLUTE: 0 E9/L (ref 0–0.2)
BASOPHILS RELATIVE PERCENT: 1.1 % (ref 0–2)
BILIRUB SERPL-MCNC: 22.1 MG/DL (ref 0–1.2)
BUN BLDV-MCNC: 15 MG/DL (ref 6–20)
CALCIUM SERPL-MCNC: 7.5 MG/DL (ref 8.6–10.2)
CHLORIDE BLD-SCNC: 97 MMOL/L (ref 98–107)
CO2: 26 MMOL/L (ref 22–29)
CREAT SERPL-MCNC: 1 MG/DL (ref 0.7–1.2)
EOSINOPHILS ABSOLUTE: 0.19 E9/L (ref 0.05–0.5)
EOSINOPHILS RELATIVE PERCENT: 1.8 % (ref 0–6)
GFR AFRICAN AMERICAN: >60
GFR NON-AFRICAN AMERICAN: >60 ML/MIN/1.73
GLUCOSE BLD-MCNC: 82 MG/DL (ref 74–99)
HCT VFR BLD CALC: 26.2 % (ref 37–54)
HEMOGLOBIN: 8.5 G/DL (ref 12.5–16.5)
HYPOCHROMIA: ABNORMAL
INR BLD: 2.3
LYMPHOCYTES ABSOLUTE: 0.42 E9/L (ref 1.5–4)
LYMPHOCYTES RELATIVE PERCENT: 3.5 % (ref 20–42)
MCH RBC QN AUTO: 31.3 PG (ref 26–35)
MCHC RBC AUTO-ENTMCNC: 32.4 % (ref 32–34.5)
MCV RBC AUTO: 96.3 FL (ref 80–99.9)
MONOCYTES ABSOLUTE: 0.62 E9/L (ref 0.1–0.95)
MONOCYTES RELATIVE PERCENT: 6.1 % (ref 2–12)
NEUTROPHILS ABSOLUTE: 9.26 E9/L (ref 1.8–7.3)
NEUTROPHILS RELATIVE PERCENT: 88.6 % (ref 43–80)
OVALOCYTES: ABNORMAL
PDW BLD-RTO: 22.2 FL (ref 11.5–15)
PLATELET # BLD: 223 E9/L (ref 130–450)
PMV BLD AUTO: 12.6 FL (ref 7–12)
POIKILOCYTES: ABNORMAL
POLYCHROMASIA: ABNORMAL
POTASSIUM REFLEX MAGNESIUM: 4.3 MMOL/L (ref 3.5–5)
PROTHROMBIN TIME: 25.2 SEC (ref 9.3–12.4)
RBC # BLD: 2.72 E12/L (ref 3.8–5.8)
SODIUM BLD-SCNC: 133 MMOL/L (ref 132–146)
TARGET CELLS: ABNORMAL
TEAR DROP CELLS: ABNORMAL
TOTAL PROTEIN: 6.3 G/DL (ref 6.4–8.3)
WBC # BLD: 10.4 E9/L (ref 4.5–11.5)

## 2022-02-21 PROCEDURE — 6370000000 HC RX 637 (ALT 250 FOR IP): Performed by: STUDENT IN AN ORGANIZED HEALTH CARE EDUCATION/TRAINING PROGRAM

## 2022-02-21 PROCEDURE — 36415 COLL VENOUS BLD VENIPUNCTURE: CPT

## 2022-02-21 PROCEDURE — 97129 THER IVNTJ 1ST 15 MIN: CPT

## 2022-02-21 PROCEDURE — 6360000002 HC RX W HCPCS: Performed by: INTERNAL MEDICINE

## 2022-02-21 PROCEDURE — 2580000003 HC RX 258: Performed by: STUDENT IN AN ORGANIZED HEALTH CARE EDUCATION/TRAINING PROGRAM

## 2022-02-21 PROCEDURE — 6370000000 HC RX 637 (ALT 250 FOR IP): Performed by: INTERNAL MEDICINE

## 2022-02-21 PROCEDURE — 85610 PROTHROMBIN TIME: CPT

## 2022-02-21 PROCEDURE — 94640 AIRWAY INHALATION TREATMENT: CPT

## 2022-02-21 PROCEDURE — 80053 COMPREHEN METABOLIC PANEL: CPT

## 2022-02-21 PROCEDURE — 99232 SBSQ HOSP IP/OBS MODERATE 35: CPT | Performed by: STUDENT IN AN ORGANIZED HEALTH CARE EDUCATION/TRAINING PROGRAM

## 2022-02-21 PROCEDURE — 85025 COMPLETE CBC W/AUTO DIFF WBC: CPT

## 2022-02-21 PROCEDURE — 1200000000 HC SEMI PRIVATE

## 2022-02-21 RX ADMIN — Medication 100 MG: at 08:36

## 2022-02-21 RX ADMIN — NADOLOL 20 MG: 20 TABLET ORAL at 08:37

## 2022-02-21 RX ADMIN — Medication 10 ML: at 08:36

## 2022-02-21 RX ADMIN — ENOXAPARIN SODIUM 40 MG: 100 INJECTION SUBCUTANEOUS at 08:36

## 2022-02-21 RX ADMIN — FOLIC ACID 1 MG: 1 TABLET ORAL at 08:36

## 2022-02-21 RX ADMIN — LACTULOSE 10 G: 20 SOLUTION ORAL at 21:39

## 2022-02-21 RX ADMIN — CHLORHEXIDINE GLUCONATE 0.12% ORAL RINSE 15 ML: 1.2 LIQUID ORAL at 21:39

## 2022-02-21 RX ADMIN — IPRATROPIUM BROMIDE AND ALBUTEROL SULFATE 1 AMPULE: .5; 2.5 SOLUTION RESPIRATORY (INHALATION) at 10:41

## 2022-02-21 RX ADMIN — Medication 6 MG: at 21:40

## 2022-02-21 RX ADMIN — Medication 10 ML: at 21:41

## 2022-02-21 RX ADMIN — Medication 500 MG: at 21:39

## 2022-02-21 RX ADMIN — Medication 500 MG: at 16:00

## 2022-02-21 RX ADMIN — RIFAXIMIN 550 MG: 550 TABLET ORAL at 08:36

## 2022-02-21 RX ADMIN — PANTOPRAZOLE SODIUM 40 MG: 40 TABLET, DELAYED RELEASE ORAL at 05:22

## 2022-02-21 RX ADMIN — Medication 500 MG: at 08:36

## 2022-02-21 RX ADMIN — IPRATROPIUM BROMIDE AND ALBUTEROL SULFATE 1 AMPULE: .5; 2.5 SOLUTION RESPIRATORY (INHALATION) at 16:32

## 2022-02-21 RX ADMIN — LACTULOSE 10 G: 20 SOLUTION ORAL at 08:37

## 2022-02-21 RX ADMIN — RIFAXIMIN 550 MG: 550 TABLET ORAL at 21:40

## 2022-02-21 RX ADMIN — Medication 500 MG: at 12:20

## 2022-02-21 RX ADMIN — MULTIVITAMIN 15 ML: LIQUID ORAL at 08:37

## 2022-02-21 RX ADMIN — CHLORHEXIDINE GLUCONATE 0.12% ORAL RINSE 15 ML: 1.2 LIQUID ORAL at 08:37

## 2022-02-21 RX ADMIN — PANTOPRAZOLE SODIUM 40 MG: 40 TABLET, DELAYED RELEASE ORAL at 15:24

## 2022-02-21 ASSESSMENT — PAIN SCALES - GENERAL
PAINLEVEL_OUTOF10: 0

## 2022-02-21 NOTE — PROGRESS NOTES
SPEECH LANGUAGE PATHOLOGY  DAILY PROGRESS NOTE        PATIENT NAME:  Sloane Vizcarra      :  1975          TODAY'S DATE:  2022 ROOM:  [address] [place] [address]      Pt was seen as a follow up for cognitive therapy. He was alert and oriented x4. Good sustained attention to all tasks presented/observed.       MEMORY   STM tasks completed independenty. Pt was able to recall personal information independently. Recall of recent events was good. Pt used fair detail. Immediate recall of 3-word lists produced with good outcome independently. Delayed recall of 3-word lists produced with good outcome independently. THOUGHT ORGANIZATION   Problem solving tasks completed with min cuing. Pt able to sequence steps of familiar activities  with fair+ outcome and unfamiliar activities fair outcome with min cues. Reasoning skills for concrete items was good and abstract items was fair+. Thought processing during structured tasks was fair+/good      Juliette Ching M.A., CCC-SLP  SP. [id number]        CPT code(s) 52920  therapeutic interventions that focus on cognitive function , initial  15 min    Total minutes :  15 minutes

## 2022-02-21 NOTE — PROGRESS NOTES
Chief Complaint:  Hematemesis     Subjective:    No abd pain. Diarrhea improving, reports 3 nice soft BM's yesterday. No new complaints. Objective:    /65   Pulse 72   Temp 98.6 °F (37 °C) (Temporal)   Resp 18   Ht 5' 8\" (1.727 m)   Wt 254 lb 6.6 oz (115.4 kg)   SpO2 96%   BMI 38.68 kg/m²     Current medications that patient is taking have been reviewed.     General appearance: Nontoxic appearing man  HEENT: AT/NC, MMM  Neck: FROM, supple  Lungs: Clear to auscultation anteriorly, WOB normal  CV: RRR, no MRGs  Abdomen: Soft, a bit protuberant but stable, non-tender; no masses or HSM, +BS  Extremities: No peripheral edema or digital cyanosis  Skin: Very jaundiced  Psych: Calm, cooperative  Neuro: Alert and interactive, no asterixis    Labs:  CBC with Differential:    Lab Results   Component Value Date    WBC 10.4 02/21/2022    RBC 2.72 02/21/2022    HGB 8.5 02/21/2022    HCT 26.2 02/21/2022     02/21/2022    MCV 96.3 02/21/2022    MCH 31.3 02/21/2022    MCHC 32.4 02/21/2022    RDW 22.2 02/21/2022    LYMPHOPCT 3.5 02/21/2022    MONOPCT 6.1 02/21/2022    BASOPCT 1.1 02/21/2022    MONOSABS 0.62 02/21/2022    LYMPHSABS 0.42 02/21/2022    EOSABS 0.19 02/21/2022    BASOSABS 0.00 02/21/2022     CMP:    Lab Results   Component Value Date     02/21/2022    K 4.3 02/21/2022    CL 97 02/21/2022    CO2 26 02/21/2022    BUN 15 02/21/2022    CREATININE 1.0 02/21/2022    GFRAA >60 02/21/2022    LABGLOM >60 02/21/2022    GLUCOSE 82 02/21/2022    PROT 6.3 02/21/2022    LABALBU 2.4 02/21/2022    CALCIUM 7.5 02/21/2022    BILITOT 22.1 02/21/2022    ALKPHOS 108 02/21/2022     02/21/2022    ALT 61 02/21/2022          Assessment/Plan:  Principal Problem:    Hematemesis  Active Problems:    Bleeding esophageal varices (HCC)    Alcohol abuse    Acute blood loss anemia    Pulmonary mass    Hyperkalemia    Morbid obesity with BMI of 40.0-44.9, adult (HCC)    Transaminitis    Shock liver    Acute CHF (congestive heart failure) ([facility] [address]  Resolved Problems:    * No resolved hospital problems. *         Possible liver transplant candidate if stays abstinent of alcohol, and if survives acute phase of liver injury (seems to be doing OK)    Bili, INR all stuck pretty high.   Slightly worse today but doesn't seem to be going up or down quickly    Case d/w GI, OK for d/c but really encouraging patient to pursue etOH rehab    Continue nadolol    Continue thiamine, folic acid, multivitamin    Continue current lactulose dose    PPI    DVT prophylaxis: UFH  Medically stable for d/c  Dispo TBD - home vs inpatient rehab    Jarett Patino MD    2:13 PM  2/21/2022

## 2022-02-21 NOTE — CARE COORDINATION
Spoke with Pt about Addiction Treatment and gave list of Facilities. Reviewed calling [facility]' for help or to talk to someone. Pt does not want to go inpatient. Pt refused Peer Recovery referral.  Pt said to call Wife for [place] [address] choice. Dr. Brewster Read spoke with Pt and Wife about Protocol for transplant. Pt is now agreeable to Addiction treatment. Spoke with Wife and Pt about Peer Support Recovery. Pt agreed to Referral.  Also gave a list of [place] [address] providers. Referral made to [facility] by secure . Discharge Plan is to return home with [place] [address] or Inpatient rehab for alcohol abuse. SW/CM to follow for discharge needs. Bilirubin increased to 22 from 20.    Francesco Campuzano, L.S.W.  [phone]

## 2022-02-21 NOTE — PLAN OF CARE
Problem: Skin Integrity:  Goal: Will show no infection signs and symptoms  Description: Will show no infection signs and symptoms  Outcome: Met This Shift  Goal: Absence of new skin breakdown  Description: Absence of new skin breakdown  2/21/2022 1024 by Shavonne Alvarez RN  Outcome: Met This Shift  2/21/2022 0030 by Maxine Dempsey RN  Outcome: Met This Shift     Problem: Falls - Risk of:  Goal: Will remain free from falls  Description: Will remain free from falls  2/21/2022 1024 by Shavonne Alvarez RN  Outcome: Met This Shift  2/21/2022 0030 by Maxine Dempsey RN  Outcome: Met This Shift  Goal: Absence of physical injury  Description: Absence of physical injury  Outcome: Met This Shift

## 2022-02-22 VITALS
HEIGHT: 68 IN | HEART RATE: 77 BPM | OXYGEN SATURATION: 95 % | TEMPERATURE: 97.3 F | BODY MASS INDEX: 38.56 KG/M2 | SYSTOLIC BLOOD PRESSURE: 126 MMHG | WEIGHT: 254.41 LBS | DIASTOLIC BLOOD PRESSURE: 74 MMHG | RESPIRATION RATE: 17 BRPM

## 2022-02-22 DIAGNOSIS — K70.10 ACUTE ALCOHOLIC HEPATITIS: Primary | ICD-10-CM

## 2022-02-22 LAB
ALBUMIN SERPL-MCNC: 2.3 G/DL (ref 3.5–5.2)
ALP BLD-CCNC: 102 U/L (ref 40–129)
ALT SERPL-CCNC: 55 U/L (ref 0–40)
ANION GAP SERPL CALCULATED.3IONS-SCNC: 9 MMOL/L (ref 7–16)
ANISOCYTOSIS: ABNORMAL
AST SERPL-CCNC: 167 U/L (ref 0–39)
BASOPHILS ABSOLUTE: 0.12 E9/L (ref 0–0.2)
BASOPHILS RELATIVE PERCENT: 1.2 % (ref 0–2)
BILIRUB SERPL-MCNC: 21.3 MG/DL (ref 0–1.2)
BUN BLDV-MCNC: 17 MG/DL (ref 6–20)
CALCIUM SERPL-MCNC: 7.4 MG/DL (ref 8.6–10.2)
CHLORIDE BLD-SCNC: 95 MMOL/L (ref 98–107)
CO2: 28 MMOL/L (ref 22–29)
CREAT SERPL-MCNC: 1.2 MG/DL (ref 0.7–1.2)
EOSINOPHILS ABSOLUTE: 0.21 E9/L (ref 0.05–0.5)
EOSINOPHILS RELATIVE PERCENT: 2.2 % (ref 0–6)
GFR AFRICAN AMERICAN: >60
GFR NON-AFRICAN AMERICAN: >60 ML/MIN/1.73
GLUCOSE BLD-MCNC: 70 MG/DL (ref 74–99)
HCT VFR BLD CALC: 26.1 % (ref 37–54)
HEMOGLOBIN: 8.8 G/DL (ref 12.5–16.5)
IMMATURE GRANULOCYTES #: 0.05 E9/L
IMMATURE GRANULOCYTES %: 0.5 % (ref 0–5)
INR BLD: 2.3
LYMPHOCYTES ABSOLUTE: 1.12 E9/L (ref 1.5–4)
LYMPHOCYTES RELATIVE PERCENT: 11.5 % (ref 20–42)
MCH RBC QN AUTO: 31.8 PG (ref 26–35)
MCHC RBC AUTO-ENTMCNC: 33.7 % (ref 32–34.5)
MCV RBC AUTO: 94.2 FL (ref 80–99.9)
MONOCYTES ABSOLUTE: 1.19 E9/L (ref 0.1–0.95)
MONOCYTES RELATIVE PERCENT: 12.3 % (ref 2–12)
NEUTROPHILS ABSOLUTE: 7.02 E9/L (ref 1.8–7.3)
NEUTROPHILS RELATIVE PERCENT: 72.3 % (ref 43–80)
OVALOCYTES: ABNORMAL
PDW BLD-RTO: 22 FL (ref 11.5–15)
PLATELET # BLD: 195 E9/L (ref 130–450)
PMV BLD AUTO: 12.3 FL (ref 7–12)
POIKILOCYTES: ABNORMAL
POLYCHROMASIA: ABNORMAL
POTASSIUM REFLEX MAGNESIUM: 3.7 MMOL/L (ref 3.5–5)
PROTHROMBIN TIME: 25.4 SEC (ref 9.3–12.4)
RBC # BLD: 2.77 E12/L (ref 3.8–5.8)
SODIUM BLD-SCNC: 132 MMOL/L (ref 132–146)
SPHEROCYTES: ABNORMAL
TARGET CELLS: ABNORMAL
TOTAL PROTEIN: 5.9 G/DL (ref 6.4–8.3)
WBC # BLD: 9.7 E9/L (ref 4.5–11.5)

## 2022-02-22 PROCEDURE — 6370000000 HC RX 637 (ALT 250 FOR IP): Performed by: INTERNAL MEDICINE

## 2022-02-22 PROCEDURE — 97530 THERAPEUTIC ACTIVITIES: CPT

## 2022-02-22 PROCEDURE — 85025 COMPLETE CBC W/AUTO DIFF WBC: CPT

## 2022-02-22 PROCEDURE — 2580000003 HC RX 258: Performed by: STUDENT IN AN ORGANIZED HEALTH CARE EDUCATION/TRAINING PROGRAM

## 2022-02-22 PROCEDURE — 6360000002 HC RX W HCPCS: Performed by: INTERNAL MEDICINE

## 2022-02-22 PROCEDURE — 97535 SELF CARE MNGMENT TRAINING: CPT

## 2022-02-22 PROCEDURE — 36415 COLL VENOUS BLD VENIPUNCTURE: CPT

## 2022-02-22 PROCEDURE — 6370000000 HC RX 637 (ALT 250 FOR IP): Performed by: STUDENT IN AN ORGANIZED HEALTH CARE EDUCATION/TRAINING PROGRAM

## 2022-02-22 PROCEDURE — 94640 AIRWAY INHALATION TREATMENT: CPT

## 2022-02-22 PROCEDURE — 80053 COMPREHEN METABOLIC PANEL: CPT

## 2022-02-22 PROCEDURE — 85610 PROTHROMBIN TIME: CPT

## 2022-02-22 RX ORDER — PANTOPRAZOLE SODIUM 40 MG/1
40 TABLET, DELAYED RELEASE ORAL
Qty: 60 TABLET | Refills: 3 | Status: SHIPPED | OUTPATIENT
Start: 2022-02-22

## 2022-02-22 RX ORDER — MULTIVIT-MIN/IRON FUM/FOLIC AC 7.5 MG-4
1 TABLET ORAL DAILY
Qty: 30 TABLET | Refills: 3 | Status: SHIPPED | OUTPATIENT
Start: 2022-02-22 | End: 2022-07-14

## 2022-02-22 RX ORDER — THIAMINE MONONITRATE (VIT B1) 100 MG
100 TABLET ORAL DAILY
Qty: 30 TABLET | Refills: 3 | Status: SHIPPED | OUTPATIENT
Start: 2022-02-23 | End: 2022-07-14 | Stop reason: SDUPTHER

## 2022-02-22 RX ORDER — NADOLOL 20 MG/1
20 TABLET ORAL DAILY
Qty: 30 TABLET | Refills: 3 | Status: SHIPPED | OUTPATIENT
Start: 2022-02-23 | End: 2022-07-14 | Stop reason: ALTCHOICE

## 2022-02-22 RX ORDER — LACTULOSE 10 G/15ML
10-20 SOLUTION ORAL 3 TIMES DAILY
Qty: 1892 ML | Refills: 1 | Status: SHIPPED | OUTPATIENT
Start: 2022-02-22 | End: 2022-07-14

## 2022-02-22 RX ORDER — FOLIC ACID 1 MG/1
1 TABLET ORAL DAILY
Qty: 30 TABLET | Refills: 3 | Status: SHIPPED | OUTPATIENT
Start: 2022-02-23 | End: 2022-07-14

## 2022-02-22 RX ADMIN — IPRATROPIUM BROMIDE AND ALBUTEROL SULFATE 1 AMPULE: .5; 2.5 SOLUTION RESPIRATORY (INHALATION) at 12:16

## 2022-02-22 RX ADMIN — Medication 100 MG: at 09:21

## 2022-02-22 RX ADMIN — Medication 10 ML: at 09:30

## 2022-02-22 RX ADMIN — Medication 500 MG: at 09:30

## 2022-02-22 RX ADMIN — FOLIC ACID 1 MG: 1 TABLET ORAL at 09:21

## 2022-02-22 RX ADMIN — NADOLOL 20 MG: 20 TABLET ORAL at 09:21

## 2022-02-22 RX ADMIN — LACTULOSE 10 G: 20 SOLUTION ORAL at 09:21

## 2022-02-22 RX ADMIN — RIFAXIMIN 550 MG: 550 TABLET ORAL at 09:21

## 2022-02-22 RX ADMIN — ENOXAPARIN SODIUM 40 MG: 100 INJECTION SUBCUTANEOUS at 09:20

## 2022-02-22 RX ADMIN — PANTOPRAZOLE SODIUM 40 MG: 40 TABLET, DELAYED RELEASE ORAL at 06:15

## 2022-02-22 RX ADMIN — CHLORHEXIDINE GLUCONATE 0.12% ORAL RINSE 15 ML: 1.2 LIQUID ORAL at 09:21

## 2022-02-22 ASSESSMENT — PAIN SCALES - GENERAL: PAINLEVEL_OUTOF10: 0

## 2022-02-22 NOTE — DISCHARGE SUMMARY
Physician Discharge Summary     Patient ID:  Estela Gilliam  [id number]  78 y.o.  1975    Admit date: 2/7/2022    Discharge date and time:  2/22/2022     Admission Diagnoses:   No chief complaint on file. Hematemesis     Discharge Diagnoses:   Principal Problem:    Hematemesis  Active Problems:    Bleeding esophageal varices (HCC)    Alcohol abuse    Acute blood loss anemia    Pulmonary mass    Hyperkalemia    Morbid obesity with BMI of 40.0-44.9, adult (HCC)    Transaminitis    Shock liver    Acute CHF (congestive heart failure) (HCC)  Resolved Problems:    * No resolved hospital problems. *       Consults: gen surg, GI, CTS    Procedures: ICU procedures, TIPS    Hospital Course:   Patient is a heavy drinker. He presented with massive hematemesis to [facility]. He underwent EGD which showed actively bleeding varices which could not be controlled endoscopically. F esophageal balloon device was placed and he was transferred to our ICU. He was noted to have small ascites. He was treated with GI, somatostatin, ceftriaxone. He had a TIPS done urgently. He was noted to have an elevated ammonia level and treated with lactulose and rifaximin. He remained on the ventilator for quite a while. Eventually the bleeding stopped and the balloon was deflated and removed. He developed shock liver which may have been superimposed on alcoholic hepatitis. Transaminases peaked in the several thousand range and then down trended. His bilirubin up trended to about 20 has remained stuck there. His INR up trended to around 2-2.3 and is stuck there. His meld is around 28-30. In the ICU he was treated for potential alcohol withdrawal although obviously the whole picture was pretty muddy. Ultimately he did okay and was extubated. His liver is profoundly dysfunctional.  He may end up needing a liver transplant if things do not improve.   He was not felt to be a candidate for methylprednisolone therapy due to the life-threatening gastrointestinal hemorrhage. Additionally the bulk of his acute liver injury was attributed to shock liver. He was advised to pursue alcohol rehab. He will do so as an outpatient. There are a few dental issues. [facility] there was some question of aorto esophageal fistula but that turned out not to be the case. He was seen by cardiothoracic surgery for this. They thought there might be a type of aortic dissection but the ED imaging was not clear and they simply advised outpatient follow-up. Additionally there is a rounded well-circumscribed thoracic/pulmonary mass which will also need outpatient follow-up. These incidental findings were discussed with the patient's family as they were noted while he was still in the ventilator.      Discharge Exam:  Vitals:    02/21/22 1015 02/21/22 1041 02/21/22 2000 02/22/22 0845   BP: 120/65  (!) 114/55 126/74   Pulse: 72  80 77   Resp: 18  18 17   Temp: 98.6 °F (37 °C)  98 °F (36.7 °C) 97.3 °F (36.3 °C)   TempSrc: Temporal  Infrared Temporal   SpO2: 94% 96% 97% 95%   Weight:       Height:            General appearance: Nontoxic appearing man  HEENT: AT/NC, MMM  Neck: FROM, supple  Lungs: Clear to auscultation anteriorly, WOB normal  CV: RRR, no MRGs  Abdomen: Soft, a bit protuberant but stable, non-tender; no masses or HSM, +BS  Extremities: No peripheral edema or digital cyanosis  Skin: Very jaundiced  Psych: Calm, cooperative  Neuro: Alert and interactive, no asterixis    Condition:  Stable    Disposition: home    Patient Instructions:   Current Discharge Medication List      START taking these medications    Details   nadolol (CORGARD) 20 MG tablet Take 1 tablet by mouth daily  Qty: 30 tablet, Refills: 3      folic acid (FOLVITE) 1 MG tablet Take 1 tablet by mouth daily  Qty: 30 tablet, Refills: 3      lactulose (CHRONULAC) 10 GM/15ML solution Take 15-30 mLs by mouth in the morning, at noon, and at bedtime Start at 10 grams three times daily. Adjust dosage as needed to maintain approximately 3 soft BM's per day. Qty: 1892 mL, Refills: 1      rifaximin (XIFAXAN) 550 MG tablet Take 1 tablet by mouth 2 times daily  Qty: 60 tablet, Refills: 3      Multiple Vitamins-Minerals (MULTIVITAMIN WITH MINERALS) tablet Take 1 tablet by mouth daily  Qty: 30 tablet, Refills: 3      pantoprazole (PROTONIX) 40 MG tablet Take 1 tablet by mouth 2 times daily (before meals) Pharmacy: Disregard prior 20 mg dosage  Qty: 60 tablet, Refills: 3      thiamine mononitrate (THIAMINE) 100 MG tablet Take 1 tablet by mouth daily  Qty: 30 tablet, Refills: 3           Activity: activity as tolerated  Diet: Cirrhosis diet per Dr. Renata Parmar with PCP in 1 week.     Note that over 30 minutes was spent in preparing discharge papers, discussing discharge with patient, medication review, etc.    Signed:  Yaniv Duron MD    2/22/2022  1:40 PM

## 2022-02-22 NOTE — CARE COORDINATION
Spoke with Pt about discharge needs. Pt has decided to go home with [facility] and get jaclyn before going to rehab. Wife has been calling [facility] Providers. Spoke with Wife - Tevin Pradhan by phone about [facility] choices. Tevin Lorne stated that PCP wants Pt home for a little while to get physically stronger before going to rehab. [facility] choices are [facility], [facility]. Referral made to [facility] by secure VM. Awaiting acceptance.    Davide Payton, L.S.W.  [phone]

## 2022-02-22 NOTE — PROGRESS NOTES
OCCUPATIONAL THERAPY TREATMENT NOTE    [facility] [address] [facility]  [address] [place] [address]       Date:2022                                                               Patient Name: Mu Teixeira  MRN: [id number]  : 1975  Room: [address] [place] [address]    Evaluating OT: Rigo Acuña OTR/L [id number]     Referring Provider: ALEJANDRA English   Specific Provider Orders/Date: OT eval and treat (22)     Diagnosis: GI BLEED                          Bleeding esophageal varices     Surgery/Procedures:        EGD ESOPHAGOGASTRODUODENOSCOPY ESOPHAGEAL BANDING       Pertinent Medical History:  alcohol abuse;,newly diagnosed Cirrhosis       *Precautions:  Fall Risk,    Assessment of current deficits   [x] Functional mobility          [x]ADLs           [x] Strength                  [x]Cognition   [x] Functional transfers        [x] IADLs         [x] Safety Awareness   [x]Endurance   [x] Fine Coordination           [x] ROM           [] Vision/perception    []Sensation     [x]Gross Motor Coordination [x] Balance    [] Delirium                  []Motor Control     [] Communication     OT PLAN OF CARE   OT POC based on physician orders, patient diagnosis and results of clinical assessment.         Frequency/Duration: 1-3 days/wk for 1-2 weeks PRN    Specific OT Treatment to include:   ADL retraining/adapted techniques and AE recommendations to increase functional independence within precautions                    Energy conservation techniques to improve tolerance for selfcare routine   Functional transfer/mobility training/DME recommendations for increased independence, safety and fall prevention         Patient/family education to increase safety and functional independence within precautions              Environmental modifications for safe mobility and completion of ADLs                           Cognitive retraining ex's to improve problem solving skills & safe participation in ADLs/IADLs  Sensory re-education techniques to improve extremity awareness, maintain skin integrity and improve hand function                             Visual/Perceptual retraining  to improve body awareness and safety during transfers and ADLs  Therapeutic activity to improve functional performance during ADLs/IADLs                                         Therapeutic exercise to improve tolerance and functional strength for ADLs   Balance retraining exercises/tasks for facilitation of postural control with dynamic challenges during ADLs . Positioning to improve functional independence  Neuromuscular re-education: facilitation of righting/equilibrium reactions, normalization muscle tone/facilitation active functional movement                      Delirium prevention/treatment     Recommended Adaptive Equipment: reporting having a shower chair at home      Home Living: Pt lives with wife and children  in a 2 floor plan with bed/bath on 2nd floor: flight with partial rail. Bathroom setup: tub/shower 2nd floor; half bath on first floor  Equipment owned: no DME     Prior Level of Function: IND with ADLs;  IND with IADLs. No device for ambulation.    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)     Pain Level:  no pain      Cognition: A&O: x 4, pleasant & cooperative, very eager to get home with his family              Memory: fair+             Comprehension fair             Problem solving: fair              Judgement/safety: fair+     Functional Assessment:  AM-PAC Daily Activity Raw Score: 20/24    Initial Eval Status  Date: 2/14 Treatment Status  Date:  2/22/22 STGs = LTGs  Time frame: 7-14 days   Feeding Min A  (unsteady hands) to hold onto cup Indep   seated at EOB for breakfast                       Valeri  while seated up in chair to increase activity tolerance         Grooming Mod A  (decreased safety regarding medical lines/tubes) SBA  Standing at sink with grooming tasks slow pace during mobility. ADL retraining: Instruction on adapted techniques to ease LB bathing/dressing, seated for washing legs & threading legs into pants then standing to pull over hips. Functional transfer training:  Instruction on postural cues, hand placement/sequencing for safety to assist with balance and fall prevention during self care routine/bathroom transfers. Improved stability with standing tasks    Energy conservation: Education on breathing techniques, pacing, work simplification strategies for safety and energy conservation during self care tasks and IADLs. Good understanding overall. Comments: Upon arrival, patient supine in bed- agreeable to session. Pt demo Fair/Good- understanding of education/techniques. At end of session, patient left seated at EOB to increase activity tolerance. Call light within reach, all lines and tubes intact. Pt instructed on use of call light for assistance and fall prevention. Overall, pt presents with decreased activity tolerance, dynamic balance, functional mobility limiting completion of ADLs and safe return home. Pt can benefit from continued skilled OT to increase safety, functional independence & quality of life. I have read the above note and agree with the documentation. Goals revised above due to good pt progress. Continue with upgraded plan of care. Sanjana Mckeon, OTR/L [id number]        Time In: 8:22            Time Out: 9:00   Total Treatment Time: 38 minutes      Treatment Charges: Mins Units   Ther Ex  [id number]     Manual Therapy Ed Burrell [id number] 1   ADL/Home Mgt [id number] 28 2   Neuro Re-ed [id number]     Orthotic manage/training  [id number]     Non-Billable Time         [place]  [address] [facility]", [address] [place] [address]

## 2022-02-22 NOTE — PLAN OF CARE
Problem: Skin Integrity:  Goal: Will show no infection signs and symptoms  Description: Will show no infection signs and symptoms  2/22/2022 1413 by Justice Oden RN  Outcome: Completed  2/22/2022 1043 by Justice Oden RN  Outcome: Met This Shift  Goal: Absence of new skin breakdown  Description: Absence of new skin breakdown  2/22/2022 1413 by Justice Oden RN  Outcome: Completed  2/22/2022 1043 by Justice Oden RN  Outcome: Met This Shift     Problem: Falls - Risk of:  Goal: Will remain free from falls  Description: Will remain free from falls  2/22/2022 1413 by Justice Oden RN  Outcome: Completed  2/22/2022 1043 by Justice Oden RN  Outcome: Met This Shift  Goal: Absence of physical injury  Description: Absence of physical injury  2/22/2022 1413 by Justice Oden RN  Outcome: Completed  2/22/2022 1043 by Justice Oden RN  Outcome: Met This Shift     Problem: Discharge Planning:  Goal: Participates in care planning  Description: Participates in care planning  Outcome: Completed  Goal: Discharged to appropriate level of care  Description: Discharged to appropriate level of care  Outcome: Completed     Problem: Airway Clearance - Ineffective:  Goal: Ability to maintain a clear airway will improve  Description: Ability to maintain a clear airway will improve  Outcome: Completed     Problem: Anxiety/Stress:  Goal: Level of anxiety will decrease  Description: Level of anxiety will decrease  Outcome: Completed     Problem: Aspiration:  Goal: Absence of aspiration  Description: Absence of aspiration  Outcome: Completed     Problem: Aspiration:  Goal: Absence of aspiration  Description: Absence of aspiration  Outcome: Completed     Problem:  Bowel Function - Altered:  Goal: Bowel elimination is within specified parameters  Description: Bowel elimination is within specified parameters  Outcome: Completed     Problem: Cardiac Output - Decreased:  Goal: Hemodynamic stability will improve  Description: Hemodynamic stability will improve  Outcome: Completed     Problem: Fluid Volume - Imbalance:  Goal: Absence of imbalanced fluid volume signs and symptoms  Description: Absence of imbalanced fluid volume signs and symptoms  Outcome: Completed     Problem: Gas Exchange - Impaired:  Goal: Levels of oxygenation will improve  Description: Levels of oxygenation will improve  Outcome: Completed     Problem: Mental Status - Impaired:  Goal: Mental status will be restored to baseline  Description: Mental status will be restored to baseline  Outcome: Completed     Problem: Nutrition Deficit:  Goal: Ability to achieve adequate nutritional intake will improve  Description: Ability to achieve adequate nutritional intake will improve  Outcome: Completed     Problem: Pain:  Goal: Pain level will decrease  Description: Pain level will decrease  Outcome: Completed  Goal: Recognizes and communicates pain  Description: Recognizes and communicates pain  Outcome: Completed  Goal: Control of acute pain  Description: Control of acute pain  Outcome: Completed  Goal: Control of chronic pain  Description: Control of chronic pain  Outcome: Completed     Problem: Serum Glucose Level - Abnormal:  Goal: Ability to maintain appropriate glucose levels will improve to within specified parameters  Description: Ability to maintain appropriate glucose levels will improve to within specified parameters  Outcome: Completed     Problem: Skin Integrity - Impaired:  Goal: Will show no infection signs and symptoms  Description: Will show no infection signs and symptoms  Outcome: Completed  Goal: Absence of new skin breakdown  Description: Absence of new skin breakdown  Outcome: Completed     Problem: Sleep Pattern Disturbance:  Goal: Appears well-rested  Description: Appears well-rested  Outcome: Completed     Problem: Tissue Perfusion, Altered:  Goal: Circulatory function within specified parameters  Description: Circulatory function

## 2022-02-22 NOTE — PLAN OF CARE
Problem: Skin Integrity:  Goal: Will show no infection signs and symptoms  Description: Will show no infection signs and symptoms  2/21/2022 2348 by Dasia Nelson RN  Outcome: Met This Shift  2/21/2022 1024 by Nate Bello RN  Outcome: Met This Shift  Goal: Absence of new skin breakdown  Description: Absence of new skin breakdown  2/21/2022 2348 by Dasia Nelson RN  Outcome: Met This Shift  2/21/2022 1024 by Nate Bello RN  Outcome: Met This Shift     Problem: Falls - Risk of:  Goal: Will remain free from falls  Description: Will remain free from falls  2/21/2022 2348 by Dasia Nelson RN  Outcome: Met This Shift  2/21/2022 1024 by Nate Bello RN  Outcome: Met This Shift  Goal: Absence of physical injury  Description: Absence of physical injury  2/21/2022 2348 by Dasia Nelson RN  Outcome: Met This Shift  2/21/2022 1024 by Nate Bello RN  Outcome: Met This Shift     Problem: Discharge Planning:  Goal: Participates in care planning  Description: Participates in care planning  2/21/2022 2348 by Dasia Nelson RN  Outcome: Met This Shift  2/21/2022 1024 by Nate Bello RN  Outcome: Met This Shift  Goal: Discharged to appropriate level of care  Description: Discharged to appropriate level of care  2/21/2022 2348 by Dasia Nelson RN  Outcome: Met This Shift  2/21/2022 1024 by Nate Bello RN  Outcome: Met This Shift     Problem: Airway Clearance - Ineffective:  Goal: Ability to maintain a clear airway will improve  Description: Ability to maintain a clear airway will improve  2/21/2022 2348 by Dasia Nelson RN  Outcome: Met This Shift  2/21/2022 1024 by Nate Bello RN  Outcome: Met This Shift     Problem: Anxiety/Stress:  Goal: Level of anxiety will decrease  Description: Level of anxiety will decrease  2/21/2022 2348 by Dasia Nelson RN  Outcome: Met This Shift  2/21/2022 1024 by Nate Bello RN  Outcome: Met This Shift     Problem: Aspiration:  Goal: Absence of aspiration  Description: Absence of aspiration  2/21/2022 2348 by Fatoumata Medina RN  Outcome: Met This Shift  2/21/2022 1024 by Meghna Crook RN  Outcome: Met This Shift     Problem:  Bowel Function - Altered:  Goal: Bowel elimination is within specified parameters  Description: Bowel elimination is within specified parameters  2/21/2022 2348 by Fatoumata Medina RN  Outcome: Met This Shift  2/21/2022 1024 by Meghna Crook RN  Outcome: Met This Shift     Problem: Cardiac Output - Decreased:  Goal: Hemodynamic stability will improve  Description: Hemodynamic stability will improve  2/21/2022 2348 by Fatoumata Medina RN  Outcome: Met This Shift  2/21/2022 1024 by Meghna Crook RN  Outcome: Met This Shift     Problem: Fluid Volume - Imbalance:  Goal: Absence of imbalanced fluid volume signs and symptoms  Description: Absence of imbalanced fluid volume signs and symptoms  2/21/2022 2348 by Fatoumata Medina RN  Outcome: Met This Shift  2/21/2022 1024 by Meghna Crook RN  Outcome: Met This Shift     Problem: Gas Exchange - Impaired:  Goal: Levels of oxygenation will improve  Description: Levels of oxygenation will improve  2/21/2022 2348 by Fatoumata Medina RN  Outcome: Met This Shift  2/21/2022 1024 by Meghna Crook RN  Outcome: Met This Shift     Problem: Mental Status - Impaired:  Goal: Mental status will be restored to baseline  Description: Mental status will be restored to baseline  2/21/2022 2348 by Fatoumata Medina RN  Outcome: Met This Shift  2/21/2022 1024 by Meghna Crook RN  Outcome: Met This Shift     Problem: Nutrition Deficit:  Goal: Ability to achieve adequate nutritional intake will improve  Description: Ability to achieve adequate nutritional intake will improve  2/21/2022 2348 by Fatoumata Medina RN  Outcome: Met This Shift  2/21/2022 1024 by Meghna Crook RN  Outcome: Met This Shift     Problem: Pain:  Goal: Pain level will decrease  Description: Pain level will decrease  2/21/2022 2348 by Sumaya Yeboah RN  Outcome: Met This Shift  2/21/2022 1024 by Endy Oneil RN  Outcome: Met This Shift  Goal: Recognizes and communicates pain  Description: Recognizes and communicates pain  2/21/2022 2348 by Sumaya Yeboah RN  Outcome: Met This Shift  2/21/2022 1024 by Endy Oneil RN  Outcome: Met This Shift  Goal: Control of acute pain  Description: Control of acute pain  2/21/2022 2348 by Sumaya Yeboah RN  Outcome: Met This Shift  2/21/2022 1024 by Endy Oneil RN  Outcome: Met This Shift  Goal: Control of chronic pain  Description: Control of chronic pain  2/21/2022 2348 by Sumaya Yeboah RN  Outcome: Met This Shift  2/21/2022 1024 by Endy Oneil RN  Outcome: Met This Shift     Problem: Serum Glucose Level - Abnormal:  Goal: Ability to maintain appropriate glucose levels will improve to within specified parameters  Description: Ability to maintain appropriate glucose levels will improve to within specified parameters  2/21/2022 2348 by Sumaya Yeboah RN  Outcome: Met This Shift  2/21/2022 1024 by Endy Oneil RN  Outcome: Met This Shift     Problem: Skin Integrity - Impaired:  Goal: Will show no infection signs and symptoms  Description: Will show no infection signs and symptoms  2/21/2022 2348 by Sumaya Yeboah RN  Outcome: Met This Shift  2/21/2022 1024 by Endy Oneil RN  Outcome: Met This Shift  Goal: Absence of new skin breakdown  Description: Absence of new skin breakdown  2/21/2022 2348 by Sumaya Yeboah RN  Outcome: Met This Shift  2/21/2022 1024 by Endy Oneil RN  Outcome: Met This Shift     Problem: Sleep Pattern Disturbance:  Goal: Appears well-rested  Description: Appears well-rested  2/21/2022 2348 by Sumaya Yeboah RN  Outcome: Met This Shift  2/21/2022 1024 by Endy Oneil RN  Outcome: Met This Shift     Problem: Tissue Perfusion, Altered:  Goal: Circulatory function within specified parameters  Description: Circulatory function within specified parameters  2/21/2022 2348 by Marco A Maldonado RN  Outcome: Met This Shift  2/21/2022 1024 by Pawan Rodriguez RN  Outcome: Met This Shift     Problem: Tissue Perfusion - Cardiopulmonary, Altered:  Goal: Absence of angina  Description: Absence of angina  2/21/2022 2348 by Marco A Maldonado RN  Outcome: Met This Shift  2/21/2022 1024 by Pawan Rodriguez RN  Outcome: Met This Shift  Goal: Hemodynamic stability will improve  Description: Hemodynamic stability will improve  2/21/2022 2348 by Marco A Maldonado RN  Outcome: Met This Shift  2/21/2022 1024 by Pawan Rodriguez RN  Outcome: Met This Shift     Problem: Pain:  Goal: Pain level will decrease  Description: Pain level will decrease  2/21/2022 2348 by Marco A Maldonado RN  Outcome: Met This Shift  2/21/2022 1024 by Pawan Rodriguez RN  Outcome: Met This Shift  Goal: Control of acute pain  Description: Control of acute pain  2/21/2022 2348 by Marco A Maldonado RN  Outcome: Met This Shift  2/21/2022 1024 by Pawan Rodriguez RN  Outcome: Met This Shift  Goal: Control of chronic pain  Description: Control of chronic pain  2/21/2022 2348 by Marco A Maldonado RN  Outcome: Met This Shift  2/21/2022 1024 by Pawan Rodriguez RN  Outcome: Met This Shift     Problem: Musculor/Skeletal Functional Status  Goal: Highest potential functional level  2/21/2022 2348 by Marco A Maldonado RN  Outcome: Met This Shift  2/21/2022 1024 by Pawan Rodriguez RN  Outcome: Met This Shift  Goal: Absence of falls  2/21/2022 2348 by Marco A Maldonado RN  Outcome: Met This Shift  2/21/2022 1024 by Pawan Rodriguez RN  Outcome: Met This Shift     Problem: Fluid Volume:  Goal: Hemodynamic stability will improve  Description: Hemodynamic stability will improve  2/21/2022 2348 by Marco A Maldonado RN  Outcome: Met This Shift  2/21/2022 1024 by Pawan Rodriguez RN  Outcome: Met This Shift  Goal: Ability to maintain a balanced intake and output will improve  Description: Ability to maintain a balanced intake and output will improve  2/21/2022 2348 by Erica Middleton RN  Outcome: Met This Shift  2/21/2022 1024 by Jo Finley RN  Outcome: Met This Shift     Problem: Health Behavior:  Goal: Identification of resources available to assist in meeting health care needs will improve  Description: Identification of resources available to assist in meeting health care needs will improve  2/21/2022 2348 by Erica Middleton RN  Outcome: Met This Shift  2/21/2022 1024 by Jo Finley RN  Outcome: Met This Shift     Problem: Respiratory:  Goal: Respiratory status will improve  Description: Respiratory status will improve  2/21/2022 2348 by Erica Middleton RN  Outcome: Met This Shift  2/21/2022 1024 by Jo Finley RN  Outcome: Met This Shift

## 2022-02-22 NOTE — CARE COORDINATION
[address] Liaison has accepted Pt and will open on Saturday. Informed Pt, Mother, Wife of Pt's acceptance by [facility] with services starting on Saturday. Pt's Mother will provide transport home. Discharge plan is home with [facility].     LEO Raygoza.  [phone]

## 2022-02-22 NOTE — PROGRESS NOTES
Hepatology Progress Note    SUBJECTIVE:    Patient remains slowly recovering. He is regaining strength and working well with PT. He has adequate appetite. He is looking into programs for EtOH rehab. OBJECTIVE    Physical    VITALS:  BP (!) 114/55   Pulse 80   Temp 98 °F (36.7 °C) (Infrared)   Resp 18   Ht 5' 8\" (1.727 m)   Wt 254 lb 6.6 oz (115.4 kg)   SpO2 97%   BMI 38.68 kg/m²   Physical Exam:  General: Chronically ill-appearing, NAD  HEENT: PERRLA, EOMI, MMM, no rhinorrhea. Scleral icterus. Cards: RRR, no LE edema  Resp: Breathing comfortably on room air, good air movement, no use of accessory muscles, no audible wheezing  Abdomen: soft, NT. Distended but with no appreciable fluid. Extremities: Moves all extremities, no effusions or bruising. Skin: No rashes. Jaundice  Neuro: A&O x 3, CN grossly intact, non-focal exam. No asterixis. ASSESSMENT AND PLAN    46y/M w/ history of EtOH abuse presents w/ acute esophageal variceal bleed s/p EGD w/ banding which was unable to stop the active bleeding now s/p TIPS placement.     Admission DF: 22.7  MELD on admission: 18  MELD today: 30     PLAN:  1. 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 Poor prognostic indicator though likely still recovering from significant bleeding event.   -Patient is interested in pursuing inpatient rehab for alcohol use. -Will continue to monitor.      2. Esophageal Variceal Hemorrhage:  -Hgb stable. -Continue PPI BID.    -5 day course of Ceftriaxone completed  -TIPS Duplex showed patency. -Nadolol noted to be started. This will be okay in the acute setting and will likely be able to be d/c in the near future in the setting of patent TIPS. -This can be continued until follow-up Endoscopy which will be performed on outpatient basis.       3.  Hepatic Encephalopathy:  -Continue lactulose with titration to 3-4 BMs daily.  -Continue rifaximin BID.     4. Ascites:  -RUQ u/s with no evidence of ascites. Patient is stable for d/c from my standpoint. I will arrange for LFTs to be monitored q 2 weeks and will arrange for close outpatient follow-up.    Montgomery Gist you for including us in the care of this patient.  Please do not hesitate to contact us with any additional questions or concerns.     Filomena Coy MD  Gastroenterology/Hepatology  [facility]

## 2022-02-22 NOTE — PLAN OF CARE
Problem: Skin Integrity:  Goal: Will show no infection signs and symptoms  Description: Will show no infection signs and symptoms  2/22/2022 1043 by Ida Sawant RN  Outcome: Met This Shift  2/21/2022 2348 by Jennifer Alpers, RN  Outcome: Met This Shift  Goal: Absence of new skin breakdown  Description: Absence of new skin breakdown  2/22/2022 1043 by Ida Sawant RN  Outcome: Met This Shift  2/21/2022 2348 by Jennifer Alpers, RN  Outcome: Met This Shift     Problem: Falls - Risk of:  Goal: Will remain free from falls  Description: Will remain free from falls  2/22/2022 1043 by Ida Sawant RN  Outcome: Met This Shift  2/21/2022 2348 by Jennifer Alpers, RN  Outcome: Met This Shift  Goal: Absence of physical injury  Description: Absence of physical injury  2/22/2022 1043 by Ida Sawant RN  Outcome: Met This Shift  2/21/2022 2348 by Jennifer Alpers, RN  Outcome: Met This Shift     Problem: Discharge Planning:  Goal: Participates in care planning  Description: Participates in care planning  2/21/2022 2348 by Jennifer Alpers, RN  Outcome: Met This Shift  Goal: Discharged to appropriate level of care  Description: Discharged to appropriate level of care  2/21/2022 2348 by Jennifer Alpers, RN  Outcome: Met This Shift

## 2022-02-23 ENCOUNTER — TELEPHONE (OUTPATIENT)
Dept: CARDIOTHORACIC SURGERY | Age: 47
End: 2022-02-23

## 2022-02-25 ENCOUNTER — TELEPHONE (OUTPATIENT)
Dept: SURGERY | Age: 47
End: 2022-02-25

## 2022-02-25 DIAGNOSIS — K72.00 ACUTE LIVER FAILURE WITHOUT HEPATIC COMA: Primary | ICD-10-CM

## 2022-02-25 DIAGNOSIS — Z95.828 S/P TIPS (TRANSJUGULAR INTRAHEPATIC PORTOSYSTEMIC SHUNT): ICD-10-CM

## 2022-03-02 ENCOUNTER — OFFICE VISIT (OUTPATIENT)
Dept: GASTROENTEROLOGY | Age: 47
End: 2022-03-02
Payer: COMMERCIAL

## 2022-03-02 VITALS
HEART RATE: 52 BPM | WEIGHT: 253.6 LBS | RESPIRATION RATE: 18 BRPM | DIASTOLIC BLOOD PRESSURE: 68 MMHG | TEMPERATURE: 97.2 F | OXYGEN SATURATION: 92 % | SYSTOLIC BLOOD PRESSURE: 109 MMHG | HEIGHT: 68 IN | BODY MASS INDEX: 38.43 KG/M2

## 2022-03-02 DIAGNOSIS — K70.10 ACUTE ALCOHOLIC HEPATITIS: ICD-10-CM

## 2022-03-02 DIAGNOSIS — I85.11 SECONDARY ESOPHAGEAL VARICES WITH BLEEDING (HCC): ICD-10-CM

## 2022-03-02 DIAGNOSIS — Z95.828 S/P TIPS (TRANSJUGULAR INTRAHEPATIC PORTOSYSTEMIC SHUNT): ICD-10-CM

## 2022-03-02 DIAGNOSIS — K70.10 ACUTE ALCOHOLIC HEPATITIS: Primary | ICD-10-CM

## 2022-03-02 LAB
ALBUMIN SERPL-MCNC: 2.3 G/DL (ref 3.5–5.2)
ALP BLD-CCNC: 134 U/L (ref 40–129)
ALT SERPL-CCNC: 39 U/L (ref 0–40)
ANION GAP SERPL CALCULATED.3IONS-SCNC: 20 MMOL/L (ref 7–16)
AST SERPL-CCNC: 170 U/L (ref 0–39)
BILIRUB SERPL-MCNC: 28.6 MG/DL (ref 0–1.2)
BUN BLDV-MCNC: 80 MG/DL (ref 6–20)
CALCIUM SERPL-MCNC: 8.4 MG/DL (ref 8.6–10.2)
CHLORIDE BLD-SCNC: 92 MMOL/L (ref 98–107)
CO2: 18 MMOL/L (ref 22–29)
CREAT SERPL-MCNC: 8.1 MG/DL (ref 0.7–1.2)
GFR AFRICAN AMERICAN: 9
GFR NON-AFRICAN AMERICAN: 7 ML/MIN/1.73
GLUCOSE BLD-MCNC: 118 MG/DL (ref 74–99)
INR BLD: 1.9
POTASSIUM SERPL-SCNC: 4.4 MMOL/L (ref 3.5–5)
PROTHROMBIN TIME: 20.8 SEC (ref 9.3–12.4)
SODIUM BLD-SCNC: 130 MMOL/L (ref 132–146)
TOTAL PROTEIN: 6.6 G/DL (ref 6.4–8.3)

## 2022-03-02 PROCEDURE — 1111F DSCHRG MED/CURRENT MED MERGE: CPT | Performed by: STUDENT IN AN ORGANIZED HEALTH CARE EDUCATION/TRAINING PROGRAM

## 2022-03-02 PROCEDURE — G8427 DOCREV CUR MEDS BY ELIG CLIN: HCPCS | Performed by: STUDENT IN AN ORGANIZED HEALTH CARE EDUCATION/TRAINING PROGRAM

## 2022-03-02 PROCEDURE — G8417 CALC BMI ABV UP PARAM F/U: HCPCS | Performed by: STUDENT IN AN ORGANIZED HEALTH CARE EDUCATION/TRAINING PROGRAM

## 2022-03-02 PROCEDURE — 4004F PT TOBACCO SCREEN RCVD TLK: CPT | Performed by: STUDENT IN AN ORGANIZED HEALTH CARE EDUCATION/TRAINING PROGRAM

## 2022-03-02 PROCEDURE — 99213 OFFICE O/P EST LOW 20 MIN: CPT | Performed by: STUDENT IN AN ORGANIZED HEALTH CARE EDUCATION/TRAINING PROGRAM

## 2022-03-02 PROCEDURE — G8484 FLU IMMUNIZE NO ADMIN: HCPCS | Performed by: STUDENT IN AN ORGANIZED HEALTH CARE EDUCATION/TRAINING PROGRAM

## 2022-03-02 RX ORDER — HYDROXYZINE HYDROCHLORIDE 25 MG/1
25 TABLET, FILM COATED ORAL EVERY 8 HOURS PRN
Qty: 90 TABLET | Refills: 5 | Status: SHIPPED | OUTPATIENT
Start: 2022-03-02 | End: 2022-03-12

## 2022-03-02 NOTE — PROGRESS NOTES
Hepatology Outpatient Progress Note      SUBJECTIVE:      Mr. Angelica Garcia is a 46y/M who presents to clinic in follow-up after a recent hospitalization for acute alcoholic hepatitis. The patient originally presented to [facility] on 2/7/22 w/ large volume hematemesis s/p EGD w/ banding without control of bleed s/p placement of [place] tube w/ transfer of patient to [facility] for emergent TIPS placement which occurred on the same day (2/7). He was stabilized in the ICU w/ no further evidence of bleeding. TIPS U/S was performed on 2/9 and showed patency. Etiology: EtOH  MELD on 2/22: 31  HE: Controlled on lactulose and rifaximin. EV: Large-volume bleed on presentation now s/p TIPS. On nadolol currently. Ascites: s/p TIPS. No history. HCC: No focal lesions on CTA 2/7. A1AT: Normal  Ceruloplasmin: Normal  Ferritin/Iron: Normal/Low  HBsAg: NR  HCV Ab: NR  ASMA: Negative      The patient presents today remaining overall deconditioned and w/ persistent malaise. He denies blood in his stool or ascites. He has been taking his lactulose w/ 4-5 bowel movements daily. He denies episodes of encephalopathy. He does have pruritis and remains jaundiced. He denies ascites but has slight LE edema. OBJECTIVE    Physical    VITALS:  /68 (Site: Right Lower Arm, Position: Sitting, Cuff Size: Medium Adult)   Pulse 52   Temp 97.2 °F (36.2 °C) (Infrared)   Resp 18   Ht 5' 8\" (1.727 m)   Wt 253 lb 9.6 oz (115 kg)   SpO2 92%   BMI 38.56 kg/m²   Physical Exam:  General: Chronically ill-appearing, NAD  HEENT: PERRLA, EOMI, MMM, no rhinorrhea. Scleral icterus  Cards: RRR. Mild LE edema. Resp: Breathing comfortably on room air, good air movement, no use of accessory muscles, no audible wheezing  Abdomen: soft, NT, ND. No appreciable ascites. Extremities: Moves all extremities, no effusions or bruising. Skin: No rashes. Jaundice. Neuro: A&O x 3, CN grossly intact, non-focal exam. No asterixis present.     ASSESSMENT AND PLAN      46y/M w/ recent admission for acute alcoholic hepatitis c/b large volume variceal bleed s/p failed banding attempt and TIPS placement who presents in follow-up. MELD 2/22: 31    PLAN:  1. AAH:  -CBC/CMP/INR to be drawn q 2 weeks until steady improvement at which point frequency can be reduced.   -Will need to collect HAV Ab, HBsAb, HBcAb, and AMA. -The patient remains abstinent. He reports that his last drink was 2/6/22.  -He has an appointment at [facility] next week for a second opinion. Some centers are selectively performing liver transplants on acute alcoholic hepatitis patients before meeting the usual 6 month sobriety mandate and he will have this discussion with them at that appointment.  -He will continue Physical Therapy and a high-protein diet. 2. Variceal Bleed:  -s/p TIPS placement.  -No evidence of persistent bleeding  -I will obtain CBC q 2 weeks  -I will plan on repeat EGD in 4 weeks or sooner based on lab trend.  -He currently remains on nadolol which should not need to be in place in the setting of a TIPS. -As long as he is tolerating, I will keep him on this medication until repeat EGD. If no varices present, will plan on d/c in the setting of TIPS. 3. HE:  -He will continue lactulose titrated to 3-4 BMs daily.  -He will continue rifaximin. I will see the patient back in clinic in 2 weeks for short interval follow-up. Thank you for including us in the care of this patient. Please do not hesitate to contact us with any additional questions or concerns.     Romaine Bose MD  Gastroenterology/Hepatology  [facility]

## 2022-03-03 ENCOUNTER — TELEPHONE (OUTPATIENT)
Dept: GASTROENTEROLOGY | Age: 47
End: 2022-03-03

## 2022-03-03 LAB
ANISOCYTOSIS: ABNORMAL
BASOPHILS ABSOLUTE: 0.09 E9/L (ref 0–0.2)
BASOPHILS RELATIVE PERCENT: 0.9 % (ref 0–2)
BURR CELLS: ABNORMAL
EOSINOPHILS ABSOLUTE: 0.3 E9/L (ref 0.05–0.5)
EOSINOPHILS RELATIVE PERCENT: 3.1 % (ref 0–6)
HCT VFR BLD CALC: 29.5 % (ref 37–54)
HEMOGLOBIN: 10 G/DL (ref 12.5–16.5)
HYPOCHROMIA: ABNORMAL
IMMATURE GRANULOCYTES #: 0.03 E9/L
IMMATURE GRANULOCYTES %: 0.3 % (ref 0–5)
LYMPHOCYTES ABSOLUTE: 0.96 E9/L (ref 1.5–4)
LYMPHOCYTES RELATIVE PERCENT: 9.8 % (ref 20–42)
MCH RBC QN AUTO: 31.2 PG (ref 26–35)
MCHC RBC AUTO-ENTMCNC: 33.9 % (ref 32–34.5)
MCV RBC AUTO: 91.9 FL (ref 80–99.9)
MONOCYTES ABSOLUTE: 1.21 E9/L (ref 0.1–0.95)
MONOCYTES RELATIVE PERCENT: 12.3 % (ref 2–12)
NEUTROPHILS ABSOLUTE: 7.22 E9/L (ref 1.8–7.3)
NEUTROPHILS RELATIVE PERCENT: 73.6 % (ref 43–80)
OVALOCYTES: ABNORMAL
PDW BLD-RTO: 20.1 FL (ref 11.5–15)
PLATELET # BLD: 159 E9/L (ref 130–450)
PMV BLD AUTO: 12.6 FL (ref 7–12)
POIKILOCYTES: ABNORMAL
POLYCHROMASIA: ABNORMAL
RBC # BLD: 3.21 E12/L (ref 3.8–5.8)
SCHISTOCYTES: ABNORMAL
TARGET CELLS: ABNORMAL
TEAR DROP CELLS: ABNORMAL
WBC # BLD: 9.8 E9/L (ref 4.5–11.5)

## 2022-03-03 NOTE — TELEPHONE ENCOUNTER
I received a critical lab result last night of the patient having a creatinine of 8. His MELD is currently 40. He does not have potassium level irregularities and he is making urine. I called him and notified him that he needs to be seen in the ED and admitted. I am not going to be available as I will be out of town starting tomorrow. The patient was going to be getting evaluated at [facility] for potential transplant evaluation. I advised the patient to make a decision to either go the ED in [facility] and facilitate a transfer to [facility] or travel to [facility]. He is going to discuss further his wife. I told him that he should be seen in an ED by the mid-afternoon. He stated understanding. I will be checking back in on him in a few hours.

## 2022-03-15 ENCOUNTER — TELEPHONE (OUTPATIENT)
Dept: GASTROENTEROLOGY | Age: 47
End: 2022-03-15

## 2022-03-16 ENCOUNTER — PREP FOR PROCEDURE (OUTPATIENT)
Dept: INTERNAL MEDICINE CLINIC | Age: 47
End: 2022-03-16

## 2022-06-23 ENCOUNTER — HOSPITAL ENCOUNTER (OUTPATIENT)
Age: 47
Discharge: HOME | End: 2022-06-23
Payer: COMMERCIAL

## 2022-06-23 LAB
ALBUMIN SERPL-MCNC: 4.3 G/DL (ref 3.5–5.2)
ALP BLD-CCNC: 50 U/L (ref 40–129)
ALT SERPL-CCNC: 8 U/L (ref 0–40)
ANION GAP SERPL CALCULATED.3IONS-SCNC: 11 MMOL/L (ref 7–16)
ANISOCYTOSIS: ABNORMAL
AST SERPL-CCNC: 12 U/L (ref 0–39)
ATYPICAL LYMPHOCYTE RELATIVE PERCENT: 1.7 % (ref 0–4)
BASOPHILS ABSOLUTE: 0.03 E9/L (ref 0–0.2)
BASOPHILS RELATIVE PERCENT: 2.6 % (ref 0–2)
BILIRUB SERPL-MCNC: 0.2 MG/DL (ref 0–1.2)
BUN BLDV-MCNC: 30 MG/DL (ref 6–20)
CALCIUM SERPL-MCNC: 9.4 MG/DL (ref 8.6–10.2)
CHLORIDE BLD-SCNC: 107 MMOL/L (ref 98–107)
CO2: 20 MMOL/L (ref 22–29)
CREAT SERPL-MCNC: 1.9 MG/DL (ref 0.7–1.2)
EOSINOPHILS ABSOLUTE: 0.07 E9/L (ref 0.05–0.5)
EOSINOPHILS RELATIVE PERCENT: 5.3 % (ref 0–6)
GAMMA GLUTAMYL TRANSFERASE: 10 U/L (ref 10–71)
GFR AFRICAN AMERICAN: 47
GFR NON-AFRICAN AMERICAN: 39 ML/MIN/1.73
GLUCOSE BLD-MCNC: 99 MG/DL (ref 74–99)
HCT VFR BLD CALC: 32.3 % (ref 37–54)
HEMOGLOBIN: 10.3 G/DL (ref 12.5–16.5)
INR BLD: 0.9
LYMPHOCYTES ABSOLUTE: 0.53 E9/L (ref 1.5–4)
LYMPHOCYTES RELATIVE PERCENT: 39.5 % (ref 20–42)
MCH RBC QN AUTO: 29 PG (ref 26–35)
MCHC RBC AUTO-ENTMCNC: 31.9 % (ref 32–34.5)
MCV RBC AUTO: 91 FL (ref 80–99.9)
METAMYELOCYTES RELATIVE PERCENT: 0.9 % (ref 0–1)
MONOCYTES ABSOLUTE: 0.14 E9/L (ref 0.1–0.95)
MONOCYTES RELATIVE PERCENT: 11.4 % (ref 2–12)
MYELOCYTE PERCENT: 0.9 % (ref 0–0)
NEUTROPHILS ABSOLUTE: 0.52 E9/L (ref 1.8–7.3)
NEUTROPHILS RELATIVE PERCENT: 37.7 % (ref 43–80)
NUCLEATED RED BLOOD CELLS: 0 /100 WBC
OVALOCYTES: ABNORMAL
PDW BLD-RTO: 13 FL (ref 11.5–15)
PLATELET # BLD: 206 E9/L (ref 130–450)
PMV BLD AUTO: 9.3 FL (ref 7–12)
POIKILOCYTES: ABNORMAL
POTASSIUM SERPL-SCNC: 5 MMOL/L (ref 3.5–5)
PROTHROMBIN TIME: 10.2 SEC (ref 9.3–12.4)
RBC # BLD: 3.55 E12/L (ref 3.8–5.8)
SODIUM BLD-SCNC: 138 MMOL/L (ref 132–146)
TOTAL PROTEIN: 7.1 G/DL (ref 6.4–8.3)
WBC # BLD: 1.3 E9/L (ref 4.5–11.5)

## 2022-06-23 PROCEDURE — 80053 COMPREHEN METABOLIC PANEL: CPT

## 2022-06-23 PROCEDURE — 80321 ALCOHOLS BIOMARKERS 1OR 2: CPT

## 2022-06-23 PROCEDURE — 36415 COLL VENOUS BLD VENIPUNCTURE: CPT

## 2022-06-23 PROCEDURE — 80307 DRUG TEST PRSMV CHEM ANLYZR: CPT

## 2022-06-23 PROCEDURE — 82977 ASSAY OF GGT: CPT

## 2022-06-23 PROCEDURE — 85610 PROTHROMBIN TIME: CPT

## 2022-06-23 PROCEDURE — 80197 ASSAY OF TACROLIMUS: CPT

## 2022-06-23 PROCEDURE — 85025 COMPLETE CBC W/AUTO DIFF WBC: CPT

## 2022-06-26 LAB — TACROLIMUS BLOOD: 7.8 NG/ML

## 2022-06-28 LAB
Lab: NORMAL
Lab: NORMAL
REPORT: NORMAL
REPORT: NORMAL
THIS TEST SENT TO: NORMAL
THIS TEST SENT TO: NORMAL

## 2022-07-06 ENCOUNTER — HOSPITAL ENCOUNTER (OUTPATIENT)
Age: 47
Discharge: HOME | End: 2022-07-06
Payer: COMMERCIAL

## 2022-07-06 LAB
ALBUMIN SERPL-MCNC: 4.1 G/DL (ref 3.5–5.2)
ALP BLD-CCNC: 58 U/L (ref 40–129)
ALT SERPL-CCNC: 14 U/L (ref 0–40)
ANION GAP SERPL CALCULATED.3IONS-SCNC: 13 MMOL/L (ref 7–16)
AST SERPL-CCNC: 16 U/L (ref 0–39)
BASOPHILS ABSOLUTE: 0.03 E9/L (ref 0–0.2)
BASOPHILS RELATIVE PERCENT: 1.8 % (ref 0–2)
BILIRUB SERPL-MCNC: <0.2 MG/DL (ref 0–1.2)
BUN BLDV-MCNC: 24 MG/DL (ref 6–20)
CALCIUM SERPL-MCNC: 9.3 MG/DL (ref 8.6–10.2)
CHLORIDE BLD-SCNC: 106 MMOL/L (ref 98–107)
CO2: 20 MMOL/L (ref 22–29)
CREAT SERPL-MCNC: 1.8 MG/DL (ref 0.7–1.2)
EOSINOPHILS ABSOLUTE: 0.16 E9/L (ref 0.05–0.5)
EOSINOPHILS RELATIVE PERCENT: 9.7 % (ref 0–6)
GFR AFRICAN AMERICAN: 50
GFR NON-AFRICAN AMERICAN: 41 ML/MIN/1.73
GLUCOSE BLD-MCNC: 101 MG/DL (ref 74–99)
HCT VFR BLD CALC: 29.7 % (ref 37–54)
HEMOGLOBIN: 9.7 G/DL (ref 12.5–16.5)
INR BLD: 1
LYMPHOCYTES ABSOLUTE: 0.5 E9/L (ref 1.5–4)
LYMPHOCYTES RELATIVE PERCENT: 31 % (ref 20–42)
MCH RBC QN AUTO: 29 PG (ref 26–35)
MCHC RBC AUTO-ENTMCNC: 32.7 % (ref 32–34.5)
MCV RBC AUTO: 88.7 FL (ref 80–99.9)
METAMYELOCYTES RELATIVE PERCENT: 1.8 % (ref 0–1)
MONOCYTES ABSOLUTE: 0.19 E9/L (ref 0.1–0.95)
MONOCYTES RELATIVE PERCENT: 11.5 % (ref 2–12)
NEUTROPHILS ABSOLUTE: 0.74 E9/L (ref 1.8–7.3)
NEUTROPHILS RELATIVE PERCENT: 44.2 % (ref 43–80)
NUCLEATED RED BLOOD CELLS: 0 /100 WBC
PDW BLD-RTO: 13.2 FL (ref 11.5–15)
PLATELET # BLD: 159 E9/L (ref 130–450)
PMV BLD AUTO: 10.1 FL (ref 7–12)
POTASSIUM SERPL-SCNC: 4.6 MMOL/L (ref 3.5–5)
PROTHROMBIN TIME: 10.7 SEC (ref 9.3–12.4)
RBC # BLD: 3.35 E12/L (ref 3.8–5.8)
RBC # BLD: NORMAL 10*6/UL
SODIUM BLD-SCNC: 139 MMOL/L (ref 132–146)
TOTAL PROTEIN: 6.7 G/DL (ref 6.4–8.3)
WBC # BLD: 1.6 E9/L (ref 4.5–11.5)

## 2022-07-06 PROCEDURE — 87799 DETECT AGENT NOS DNA QUANT: CPT

## 2022-07-06 PROCEDURE — 85610 PROTHROMBIN TIME: CPT

## 2022-07-06 PROCEDURE — 80197 ASSAY OF TACROLIMUS: CPT

## 2022-07-06 PROCEDURE — 80053 COMPREHEN METABOLIC PANEL: CPT

## 2022-07-06 PROCEDURE — 36415 COLL VENOUS BLD VENIPUNCTURE: CPT

## 2022-07-06 PROCEDURE — 82105 ALPHA-FETOPROTEIN SERUM: CPT

## 2022-07-06 PROCEDURE — 85025 COMPLETE CBC W/AUTO DIFF WBC: CPT

## 2022-07-06 PROCEDURE — 87496 CYTOMEG DNA AMP PROBE: CPT

## 2022-07-08 LAB — TACROLIMUS BLOOD: 8.2 NG/ML

## 2022-07-09 LAB — AFP-TUMOR MARKER: 4 NG/ML (ref 0–9)

## 2022-07-11 LAB
CYTOMEGALOVIRUS PCR DETECTION: DETECTED
CYTOMEGALOVIRUS SOURCE: ABNORMAL

## 2022-07-12 PROBLEM — U07.1 DISEASE DUE TO SEVERE ACUTE RESPIRATORY SYNDROME CORONAVIRUS 2 (SARS-COV-2): Status: ACTIVE | Noted: 2022-07-12

## 2022-07-12 PROBLEM — R03.0 ELEVATED BLOOD PRESSURE READING WITHOUT DIAGNOSIS OF HYPERTENSION: Status: ACTIVE | Noted: 2022-07-12

## 2022-07-12 PROBLEM — K76.82 HEPATIC ENCEPHALOPATHY: Status: ACTIVE | Noted: 2022-07-12

## 2022-07-12 PROBLEM — F41.9 ANXIETY: Status: ACTIVE | Noted: 2022-07-12

## 2022-07-12 PROBLEM — R04.0 EPISTAXIS: Status: ACTIVE | Noted: 2022-07-12

## 2022-07-12 LAB
EBV, QUANT. COPY/ML: <390 CPY/ML
EBV, QUANT. INTERP: NOT DETECTED
EBV, QUANT. LOG: <2.6 LOG
EBV, QUANT. SOURCE: NORMAL

## 2022-07-12 NOTE — PROGRESS NOTES
Patient has not knowingly had unprotected exposire to anyone positive for COVID-10 within the last 14 days DENIES    AND does not have the following signs or symptoms:    A. One of the followin. Fever greater than 100.0 F NEGATIVE       2. Cough NEGATIVE       3. New onset shortness of breath NEGATIVE       4. New onset difficulty breathing NEGATIVE    AND/OR    B. Two or more of the following criteria:        1. Muscle aches NEGATIVE       2. Headache NEGATIVE       3. Sore Throat NEGATIVE       4. New onset loss of smell or taste NEGATIVE       5. New onset diarrhea NEGATIVE       6. Chills NEGATIVE       7. Runny nose NEGATIVE       8.  Sneezing NEGATIVE  Dayna Mathis LPN

## 2022-07-13 PROBLEM — K72.00 SHOCK LIVER: Status: RESOLVED | Noted: 2022-02-08 | Resolved: 2022-07-13

## 2022-07-13 PROBLEM — U07.1 DISEASE DUE TO SEVERE ACUTE RESPIRATORY SYNDROME CORONAVIRUS 2 (SARS-COV-2): Status: RESOLVED | Noted: 2022-07-12 | Resolved: 2022-07-13

## 2022-07-13 PROBLEM — D62 ACUTE BLOOD LOSS ANEMIA: Status: RESOLVED | Noted: 2022-02-07 | Resolved: 2022-07-13

## 2022-07-13 PROBLEM — R04.0 EPISTAXIS: Status: RESOLVED | Noted: 2022-07-12 | Resolved: 2022-07-13

## 2022-07-13 PROBLEM — K70.10 ACUTE ALCOHOLIC HEPATITIS: Status: RESOLVED | Noted: 2022-03-02 | Resolved: 2022-07-13

## 2022-07-13 NOTE — PROGRESS NOTES
[facility] Internal Medicine      SUBJECTIVE:  Micah Edmonds (:  1975) is a 42 y.o. male here for evaluation of the following chief complaint(s):  New Patient  Patient here to establish care. Has history of alcohol induced cirrhosis.  + History of esophageal varices with bleeding which required a TIPS procedure on 2022. History also of hepatic encephalopathy for which she is on lactulose and rifaximin treatment. Had liver transplant at [facility] in [facility] on 3/22/2022. Has second surgery for bleeding from a nicked artery. Plan will be to be seen at [facility] for the first year followed with pedro by [facility]. Prior to transplant was on hemodialysis after creatinine elevated - was in ICU prior to this. HD continued for about 1-2 months after transplant. Saw renal specialist and was taken off of HD. Amlodipine was started on 6/15/2022. Slightly elevated blood pressure. The patient has followed locally with Dr. Umm Smith with the last visit being on 2022. He will now follow with [facility] with eventual transfer of care to [facility]. Pepcid - PRN. Has not used in a week. Ran out. Prednisone for first 6 months. Then discontinue after that time. Tacrolimus varies by blood work. Will be in [facility] for follow-up next week. Prograf dosing based on blood work. Was on cellcept but this was d/c'd secondary to low WBC. Has been getting blood work at [facility]. E's.  2022 - CMV was +. Congestion at the time, now improving. Fredrick Cooley NP - [place]. Ronel Stout  [phone]    History of alcohol abuse - Has started AA meetings per recommendation of [place]. Ronel Stout. Has looked at [employer]. Has been unable to facilitate this due to work schedule. Will ask LADAN Noel for assistance. Tobacco - daily. Seeing dermatologist next week for routine care from the tacrolimus. COVID in January prior to surgery.  Has been vaccinated. Review of Systems   Constitutional:  Negative for activity change, appetite change, chills and fever. HENT:  Negative for congestion, ear pain, hearing loss, rhinorrhea, sore throat, tinnitus and trouble swallowing. Eyes:  Negative for visual disturbance. Respiratory:  Positive for cough (slight but improving). Negative for chest tightness, shortness of breath and wheezing. Cardiovascular:  Negative for chest pain, palpitations and leg swelling. Gastrointestinal:  Negative for abdominal pain, blood in stool, constipation, diarrhea, nausea and vomiting. Endocrine: Negative for cold intolerance and heat intolerance. Genitourinary:  Negative for dysuria. Musculoskeletal:  Negative for arthralgias. Current Outpatient Medications on File Prior to Visit   Medication Sig Dispense Refill    tacrolimus (PROGRAF) 5 MG capsule Take 10 mg by mouth 2 times daily      predniSONE (DELTASONE) 5 MG tablet Take 5 mg by mouth daily      magnesium oxide (MAG-OX) 400 MG tablet Take 400 mg by mouth daily      Cholecalciferol (VITAMIN D3) 1.25 MG (30767 UT) CAPS Take 5,000 Units by mouth daily      amLODIPine (NORVASC) 5 MG tablet       ASPIRIN LOW DOSE 81 MG EC tablet TAKE 1 TABLET BY MOUTH EVERY DAY      famotidine (PEPCID) 20 MG tablet Take 20 mg by mouth as needed      thiamine 100 MG tablet Take 100 mg by mouth daily      pantoprazole (PROTONIX) 40 MG tablet Take 1 tablet by mouth 2 times daily (before meals) Pharmacy: Disregard prior 20 mg dosage (Patient taking differently: Take 40 mg by mouth daily Pharmacy: Disregard prior 20 mg dosage) 60 tablet 3     No current facility-administered medications on file prior to visit.        OBJECTIVE:    VS:   Vitals:    07/14/22 0906   BP: 127/89   Site: Left Upper Arm   Position: Sitting   Cuff Size: Medium Adult   Pulse: 76   Resp: 18   Temp: 97.5 °F (36.4 °C)   TempSrc: Temporal   SpO2: 99%   Weight: 189 lb (85.7 kg)   Height: 5' 8\" (1.727 m) Physical Exam   HEENT:  PERRL, EOMI, Mouth without erythema or exudate. TMs clear B. Lungs:  CTA B  Neck:   No carotid bruits appreciated B. No LAD appreciated. No thyroid masses noted. CVS:  +s1/s2 without m/g/r appreciated. Abd:  + BS, mild tenderness to palpation over left upper quadrant, ND, No renal or aortic bruits, No hepatosplenomegaly appreciated. Extr:  2+ DP/PT pulses B, no pitting edema  Neurological exam reveals alert, oriented, normal speech, no focal findings or movement disorder noted,  cranial nerves II through XII intact, DTR's normal and symmetric, normal muscle tone, no tremors, strength 5/5, normal gait  No pronator drift. No clonus. RTC:  Return in about 3 months (around 10/14/2022). Vilma Regan MD   7/14/2022 10:19 AM    Addendum:  7/19/2022  Discussed case with Tessie Khan NP who will be seeing Rut Shelby tomorrow. She is aware of the low level + CMV and will follow-up with blood work at the visit tomorrow. Care plan would be to ultimately resume Cellcept if WBC count improves. Care will be continued at \[facility]\"" until an appointment can be secured at [facility].       Vilma Regan MD  7/19/2022

## 2022-07-14 ENCOUNTER — OFFICE VISIT (OUTPATIENT)
Dept: INTERNAL MEDICINE | Age: 47
End: 2022-07-14
Payer: COMMERCIAL

## 2022-07-14 ENCOUNTER — TELEPHONE (OUTPATIENT)
Dept: INTERNAL MEDICINE | Age: 47
End: 2022-07-14

## 2022-07-14 VITALS
SYSTOLIC BLOOD PRESSURE: 127 MMHG | OXYGEN SATURATION: 99 % | WEIGHT: 189 LBS | HEART RATE: 76 BPM | RESPIRATION RATE: 18 BRPM | HEIGHT: 68 IN | BODY MASS INDEX: 28.64 KG/M2 | DIASTOLIC BLOOD PRESSURE: 89 MMHG | TEMPERATURE: 97.5 F

## 2022-07-14 DIAGNOSIS — Z13.0 SCREENING FOR ENDOCRINE, METABOLIC AND IMMUNITY DISORDER: Primary | ICD-10-CM

## 2022-07-14 DIAGNOSIS — Z13.228 SCREENING FOR ENDOCRINE, METABOLIC AND IMMUNITY DISORDER: Primary | ICD-10-CM

## 2022-07-14 DIAGNOSIS — F10.10 ALCOHOL ABUSE: ICD-10-CM

## 2022-07-14 DIAGNOSIS — Z13.29 SCREENING FOR ENDOCRINE, METABOLIC AND IMMUNITY DISORDER: Primary | ICD-10-CM

## 2022-07-14 DIAGNOSIS — Z11.4 SCREENING FOR HUMAN IMMUNODEFICIENCY VIRUS: ICD-10-CM

## 2022-07-14 PROCEDURE — 99204 OFFICE O/P NEW MOD 45 MIN: CPT | Performed by: INTERNAL MEDICINE

## 2022-07-14 PROCEDURE — 99203 OFFICE O/P NEW LOW 30 MIN: CPT | Performed by: INTERNAL MEDICINE

## 2022-07-14 PROCEDURE — 4004F PT TOBACCO SCREEN RCVD TLK: CPT | Performed by: INTERNAL MEDICINE

## 2022-07-14 PROCEDURE — G8427 DOCREV CUR MEDS BY ELIG CLIN: HCPCS | Performed by: INTERNAL MEDICINE

## 2022-07-14 PROCEDURE — G8417 CALC BMI ABV UP PARAM F/U: HCPCS | Performed by: INTERNAL MEDICINE

## 2022-07-14 RX ORDER — CHOLECALCIFEROL (VITAMIN D3) 1250 MCG
5000 CAPSULE ORAL DAILY
COMMUNITY
Start: 2022-05-04

## 2022-07-14 RX ORDER — FAMOTIDINE 20 MG/1
20 TABLET, FILM COATED ORAL PRN
COMMUNITY
Start: 2022-07-12

## 2022-07-14 RX ORDER — AMLODIPINE BESYLATE 5 MG/1
TABLET ORAL
COMMUNITY
Start: 2022-07-08

## 2022-07-14 RX ORDER — TACROLIMUS 5 MG/1
10 CAPSULE ORAL 2 TIMES DAILY
COMMUNITY
Start: 2022-07-01

## 2022-07-14 RX ORDER — LANOLIN ALCOHOL/MO/W.PET/CERES
100 CREAM (GRAM) TOPICAL DAILY
COMMUNITY
Start: 2022-05-02

## 2022-07-14 RX ORDER — ASPIRIN 81 MG/1
TABLET, COATED ORAL
COMMUNITY
Start: 2022-04-29

## 2022-07-14 RX ORDER — PREDNISONE 1 MG/1
5 TABLET ORAL DAILY
COMMUNITY
Start: 2022-07-08

## 2022-07-14 RX ORDER — MAGNESIUM OXIDE 400 MG/1
400 TABLET ORAL DAILY
COMMUNITY
Start: 2022-06-10

## 2022-07-14 SDOH — ECONOMIC STABILITY: HOUSING INSECURITY: IN THE LAST 12 MONTHS, HOW MANY PLACES HAVE YOU LIVED?: 1

## 2022-07-14 SDOH — ECONOMIC STABILITY: HOUSING INSECURITY
IN THE LAST 12 MONTHS, WAS THERE A TIME WHEN YOU DID NOT HAVE A STEADY PLACE TO SLEEP OR SLEPT IN A SHELTER (INCLUDING NOW)?: NO

## 2022-07-14 SDOH — ECONOMIC STABILITY: TRANSPORTATION INSECURITY
IN THE PAST 12 MONTHS, HAS LACK OF TRANSPORTATION KEPT YOU FROM MEETINGS, WORK, OR FROM GETTING THINGS NEEDED FOR DAILY LIVING?: NO

## 2022-07-14 SDOH — ECONOMIC STABILITY: FOOD INSECURITY: WITHIN THE PAST 12 MONTHS, YOU WORRIED THAT YOUR FOOD WOULD RUN OUT BEFORE YOU GOT MONEY TO BUY MORE.: NEVER TRUE

## 2022-07-14 SDOH — ECONOMIC STABILITY: INCOME INSECURITY: IN THE LAST 12 MONTHS, WAS THERE A TIME WHEN YOU WERE NOT ABLE TO PAY THE MORTGAGE OR RENT ON TIME?: NO

## 2022-07-14 SDOH — ECONOMIC STABILITY: FOOD INSECURITY: WITHIN THE PAST 12 MONTHS, THE FOOD YOU BOUGHT JUST DIDN'T LAST AND YOU DIDN'T HAVE MONEY TO GET MORE.: NEVER TRUE

## 2022-07-14 SDOH — ECONOMIC STABILITY: TRANSPORTATION INSECURITY
IN THE PAST 12 MONTHS, HAS THE LACK OF TRANSPORTATION KEPT YOU FROM MEDICAL APPOINTMENTS OR FROM GETTING MEDICATIONS?: NO

## 2022-07-14 ASSESSMENT — PATIENT HEALTH QUESTIONNAIRE - PHQ9
SUM OF ALL RESPONSES TO PHQ QUESTIONS 1-9: 1
SUM OF ALL RESPONSES TO PHQ QUESTIONS 1-9: 1
2. FEELING DOWN, DEPRESSED OR HOPELESS: 0
SUM OF ALL RESPONSES TO PHQ QUESTIONS 1-9: 1
1. LITTLE INTEREST OR PLEASURE IN DOING THINGS: 1
SUM OF ALL RESPONSES TO PHQ9 QUESTIONS 1 & 2: 1
SUM OF ALL RESPONSES TO PHQ QUESTIONS 1-9: 1

## 2022-07-14 ASSESSMENT — LIFESTYLE VARIABLES: HOW OFTEN DO YOU HAVE A DRINK CONTAINING ALCOHOL: NEVER

## 2022-07-14 ASSESSMENT — SOCIAL DETERMINANTS OF HEALTH (SDOH)
HOW HARD IS IT FOR YOU TO PAY FOR THE VERY BASICS LIKE FOOD, HOUSING, MEDICAL CARE, AND HEATING?: NOT VERY HARD
HOW HARD IS IT FOR YOU TO PAY FOR THE VERY BASICS LIKE FOOD, HOUSING, MEDICAL CARE, AND HEATING?: NOT VERY HARD

## 2022-07-14 NOTE — PROGRESS NOTES
AVS printed with follow up appointment and discharge instructions and given to the patient. [facility] for [facility] obtained and faxed to obtain records.

## 2022-07-14 NOTE — TELEPHONE ENCOUNTER
SW made contact to pt related to referral. Pt reports he has a hx of alcohol use and he has not drank since February. Pt has received a liver transplant and displayed insight related to the importance of not drinking. Pt reports he is attending [place] [id number] meetings online currently and finds them helpful. Pt reports no urges for alcohol but would like to be linked with a provider to assist with coping skills and additional support. SW provided pt with phone number to [employer] to assist with his continued path of sobriety. SW provided contact information as well if there was further questions.

## 2022-07-17 ASSESSMENT — ENCOUNTER SYMPTOMS
ABDOMINAL PAIN: 0
VOMITING: 0
SORE THROAT: 0
NAUSEA: 0
BLOOD IN STOOL: 0
CHEST TIGHTNESS: 0
DIARRHEA: 0
WHEEZING: 0
TROUBLE SWALLOWING: 0
CONSTIPATION: 0
SHORTNESS OF BREATH: 0
COUGH: 1
RHINORRHEA: 0

## 2022-07-19 PROBLEM — I85.01 BLEEDING ESOPHAGEAL VARICES (HCC): Status: RESOLVED | Noted: 2022-02-07 | Resolved: 2022-07-19

## 2022-07-19 PROBLEM — E87.5 HYPERKALEMIA: Status: RESOLVED | Noted: 2022-02-07 | Resolved: 2022-07-19

## 2022-07-19 PROBLEM — K92.2 GI BLEED: Status: RESOLVED | Noted: 2022-02-07 | Resolved: 2022-07-19

## 2022-07-19 PROBLEM — K76.82 HEPATIC ENCEPHALOPATHY (HCC): Status: RESOLVED | Noted: 2022-07-12 | Resolved: 2022-07-19

## 2022-07-19 PROBLEM — K92.0 HEMATEMESIS: Status: RESOLVED | Noted: 2022-02-07 | Resolved: 2022-07-19

## 2022-07-19 PROBLEM — I50.9 ACUTE CHF (CONGESTIVE HEART FAILURE) (HCC): Status: RESOLVED | Noted: 2022-02-14 | Resolved: 2022-07-19

## 2022-07-19 PROBLEM — R74.01 TRANSAMINITIS: Status: RESOLVED | Noted: 2022-02-07 | Resolved: 2022-07-19

## 2022-08-23 ENCOUNTER — TELEPHONE (OUTPATIENT)
Dept: INTERNAL MEDICINE | Age: 47
End: 2022-08-23
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
## 2022-08-24 ENCOUNTER — OFFICE VISIT (OUTPATIENT)
Dept: INTERNAL MEDICINE | Age: 47
End: 2022-08-24

## 2022-08-24 VITALS
HEART RATE: 73 BPM | DIASTOLIC BLOOD PRESSURE: 86 MMHG | SYSTOLIC BLOOD PRESSURE: 128 MMHG | TEMPERATURE: 98 F | OXYGEN SATURATION: 95 % | WEIGHT: 191 LBS | BODY MASS INDEX: 28.95 KG/M2 | HEIGHT: 68 IN | RESPIRATION RATE: 18 BRPM

## 2022-08-24 DIAGNOSIS — Z94.4 S/P LIVER TRANSPLANT (HCC): ICD-10-CM
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
## 2022-08-24 PROBLEM — F10.11 HISTORY OF ALCOHOL ABUSE: Status: ACTIVE | Noted: 2022-02-07

## 2022-08-24 PROCEDURE — 99213 OFFICE O/P EST LOW 20 MIN: CPT | Performed by: INTERNAL MEDICINE

## 2022-08-24 PROCEDURE — 4004F PT TOBACCO SCREEN RCVD TLK: CPT | Performed by: INTERNAL MEDICINE

## 2022-08-24 PROCEDURE — G8427 DOCREV CUR MEDS BY ELIG CLIN: HCPCS | Performed by: INTERNAL MEDICINE

## 2022-08-24 PROCEDURE — G8417 CALC BMI ABV UP PARAM F/U: HCPCS | Performed by: INTERNAL MEDICINE
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
## 2022-08-24 NOTE — PROGRESS NOTES
Care gaps reviewed with patient.  The following information was ascertained:  Cervical Cancer Screening: NA  Breast Cancer Screening: NA  Colon Cancer Screening: age 41  Annual Wellness Visit: NA  Lung Cancer Screening: NA  DEXA: NA  Diabetic Foot Exam: NA  Eye Exam:   Dental Exam:   Labs: HIV, A1C, Hep B  TDaP: due, declined  Pneumonia: due, declined  Shingrix: age 50  Covid: due, declined  Influenza: NA  Hep A: due, declined  Hep B: due, declined  Dank Jones LPN

## 2022-10-22 LAB
CHOLESTEROL, TOTAL: 172 MG/DL
CHOLESTEROL/HDL RATIO: 3.9
HDLC SERPL-MCNC: 44 MG/DL (ref 35–70)
LDL CHOLESTEROL CALCULATED: 105 MG/DL (ref 0–160)
NONHDLC SERPL-MCNC: 128 MG/DL
TRIGL SERPL-MCNC: 129 MG/DL
VLDLC SERPL CALC-MCNC: NORMAL MG/DL

## 2023-01-11 LAB
ALBUMIN SERPL-MCNC: 4.5 G/DL
ALP BLD-CCNC: 78 U/L
ALT SERPL-CCNC: 12 U/L
AST SERPL-CCNC: 17 U/L
BASOPHILS ABSOLUTE: ABNORMAL
BASOPHILS RELATIVE PERCENT: ABNORMAL
BILIRUB SERPL-MCNC: 0.3 MG/DL (ref 0.1–1.4)
BUN BLDV-MCNC: 31 MG/DL
CALCIUM SERPL-MCNC: ABNORMAL MG/DL
CHLORIDE BLD-SCNC: ABNORMAL MMOL/L
CO2: ABNORMAL
CREAT SERPL-MCNC: 1.75 MG/DL
EOSINOPHILS ABSOLUTE: ABNORMAL
EOSINOPHILS RELATIVE PERCENT: ABNORMAL
GLUCOSE FASTING: ABNORMAL
HCT VFR BLD CALC: 36.8 % (ref 41–53)
HEMOGLOBIN: 12.4 G/DL (ref 13.5–17.5)
INR BLD: 0.9
LYMPHOCYTES ABSOLUTE: ABNORMAL
LYMPHOCYTES RELATIVE PERCENT: ABNORMAL
MCH RBC QN AUTO: ABNORMAL PG
MCHC RBC AUTO-ENTMCNC: ABNORMAL G/DL
MCV RBC AUTO: ABNORMAL FL
MONOCYTES ABSOLUTE: ABNORMAL
MONOCYTES RELATIVE PERCENT: ABNORMAL
NEUTROPHILS ABSOLUTE: ABNORMAL
NEUTROPHILS RELATIVE PERCENT: ABNORMAL
PDW BLD-RTO: ABNORMAL %
PLATELET # BLD: 170 K/ΜL
PMV BLD AUTO: ABNORMAL FL
POTASSIUM SERPL-SCNC: ABNORMAL MMOL/L
PROTIME: 10.2 SECONDS
RBC # BLD: 4.32 10^6/ΜL
SODIUM BLD-SCNC: ABNORMAL MMOL/L
TOTAL PROTEIN: 7.3 G/DL (ref 6.4–8.2)
WBC # BLD: 3.6 10^3/ML

## 2023-02-20 ENCOUNTER — OFFICE VISIT (OUTPATIENT)
Dept: INTERNAL MEDICINE | Age: 48
End: 2023-02-20
Payer: COMMERCIAL

## 2023-02-20 VITALS
HEART RATE: 85 BPM | WEIGHT: 200 LBS | RESPIRATION RATE: 18 BRPM | SYSTOLIC BLOOD PRESSURE: 135 MMHG | TEMPERATURE: 97.6 F | HEIGHT: 68 IN | BODY MASS INDEX: 30.31 KG/M2 | OXYGEN SATURATION: 98 % | DIASTOLIC BLOOD PRESSURE: 93 MMHG

## 2023-02-20 DIAGNOSIS — Z12.11 ENCOUNTER FOR SCREENING COLONOSCOPY: ICD-10-CM

## 2023-02-20 DIAGNOSIS — J98.8 CONGESTION OF UPPER AIRWAY: Primary | ICD-10-CM

## 2023-02-20 LAB
INFLUENZA A ANTIBODY: NORMAL
INFLUENZA B ANTIBODY: NORMAL
Lab: ABNORMAL
PERFORMING INSTRUMENT: ABNORMAL
QC PASS/FAIL: ABNORMAL
SARS-COV-2, POC: DETECTED

## 2023-02-20 PROCEDURE — 4004F PT TOBACCO SCREEN RCVD TLK: CPT | Performed by: INTERNAL MEDICINE

## 2023-02-20 PROCEDURE — 99212 OFFICE O/P EST SF 10 MIN: CPT | Performed by: INTERNAL MEDICINE

## 2023-02-20 PROCEDURE — G8417 CALC BMI ABV UP PARAM F/U: HCPCS | Performed by: INTERNAL MEDICINE

## 2023-02-20 PROCEDURE — 99213 OFFICE O/P EST LOW 20 MIN: CPT | Performed by: INTERNAL MEDICINE

## 2023-02-20 PROCEDURE — G8484 FLU IMMUNIZE NO ADMIN: HCPCS | Performed by: INTERNAL MEDICINE

## 2023-02-20 PROCEDURE — 87880 STREP A ASSAY W/OPTIC: CPT | Performed by: INTERNAL MEDICINE

## 2023-02-20 PROCEDURE — G8427 DOCREV CUR MEDS BY ELIG CLIN: HCPCS | Performed by: INTERNAL MEDICINE

## 2023-02-20 PROCEDURE — 87426 SARSCOV CORONAVIRUS AG IA: CPT | Performed by: INTERNAL MEDICINE

## 2023-02-20 PROCEDURE — 87804 INFLUENZA ASSAY W/OPTIC: CPT | Performed by: INTERNAL MEDICINE

## 2023-02-20 RX ORDER — FAMOTIDINE 20 MG/1
20 TABLET, FILM COATED ORAL 2 TIMES DAILY PRN
Qty: 60 TABLET | Refills: 1 | Status: SHIPPED | OUTPATIENT
Start: 2023-02-20

## 2023-02-20 ASSESSMENT — PATIENT HEALTH QUESTIONNAIRE - PHQ9
SUM OF ALL RESPONSES TO PHQ9 QUESTIONS 1 & 2: 0
SUM OF ALL RESPONSES TO PHQ QUESTIONS 1-9: 0
1. LITTLE INTEREST OR PLEASURE IN DOING THINGS: 0
2. FEELING DOWN, DEPRESSED OR HOPELESS: 0
SUM OF ALL RESPONSES TO PHQ QUESTIONS 1-9: 0

## 2023-02-20 NOTE — PROGRESS NOTES
[facility] Internal Medicine      SUBJECTIVE:  Caleb Zaldivar (:  1975) is a 41 y.o. male here for evaluation of the following chief complaint(s):  Follow-up (States congestion and stuffy nose)  Wednesday night fever/chills - this lasted for 1 day. (Thursday/Friday). Measured at 100.3.  + achiness which has improved and resolved. Wednesday AM - congestion. Slight HA. Mild sore throat. +rhinorrhea. Yellow drainage now. No sinus pressure or pain. Normal appeitite. Took mucinex with some relief. Also took some Tylenol on Friday. Took COVID test - Positive. Positive here today. If symptom onset was Wednesday and that is considered day 0, would be in window for treatment, however symptoms are improving and patient with relatively mild symptoms. If Wednesday day 1, patient is out of window for treatment. Interaction exists with Paxlovid and Tacrolimus upon further investigation. Recommendation would be to stop tacrolimus or to not use Paxlovid with this medication. Given clinical improvement and potential interaction with tacrolimus, will opt to NOT treat with Paxlovid at this time. I have suggested symptomatic therapy with mucinex and if needed, OTC loratadine. Chris voiced understanding. Has not taken pepcid or PPI, but requests refill of Pepcid if he should need. Refill of Pepcid sent to pharmacy for PRN dosing    [facility] requesting referral for colonoscopy. Referral sent to general surgery to complete this screening. Review of Systems as above.      Current Outpatient Medications on File Prior to Visit   Medication Sig Dispense Refill    tacrolimus (PROGRAF) 5 MG capsule Take 10 mg by mouth 2 times daily      magnesium oxide (MAG-OX) 400 MG tablet Take 400 mg by mouth daily      Cholecalciferol (VITAMIN D3) 1.25 MG (49277 UT) CAPS Take 5,000 Units by mouth daily      amLODIPine (NORVASC) 5 MG tablet       famotidine (PEPCID) 20 MG tablet Take 20 mg by mouth as needed      thiamine 100 MG tablet Take 100 mg by mouth daily       No current facility-administered medications on file prior to visit. OBJECTIVE:    VS:   Vitals:    02/20/23 1337 02/20/23 1345   BP: (!) 128/90 (!) 135/93   Site: Right Upper Arm Left Upper Arm   Position: Sitting Sitting   Cuff Size: Medium Adult Medium Adult   Pulse: 85    Resp: 18    Temp: 97.6 °F (36.4 °C)    TempSrc: Temporal    SpO2: 98%    Weight: 200 lb (90.7 kg)    Height: 5' 8\" (1.727 m)      Physical Exam   HEENT:  PERRL; EOMI, Mouth without erythema or exudate. Erythema of nasal mucosa with some swelling of nasal turbinates B. No tenderness to percussion of ethmoid/maxillary or frontal sinuses. Lungs:  CTA B, no wheezing. CVS:  +s1/s2 without m/g/r appreciated.     Abd:  + BS, NTND, No renal or aortic bruits   Extr:  2+ DP/PT pulses B, no pitting edema    RTC:  3 months    Everardo Powell MD   2/20/2023 2:23 PM

## 2023-03-01 LAB
ALBUMIN SERPL-MCNC: 4.3 G/DL
ALP BLD-CCNC: 73 U/L
ALT SERPL-CCNC: 10 U/L
AST SERPL-CCNC: 15 U/L
BASOPHILS ABSOLUTE: 20 /ΜL
BASOPHILS RELATIVE PERCENT: 0.6 %
BILIRUB SERPL-MCNC: 0.3 MG/DL (ref 0.1–1.4)
BUN BLDV-MCNC: 22 MG/DL
CALCIUM SERPL-MCNC: 9.2 MG/DL
CHLORIDE BLD-SCNC: 108 MMOL/L
CO2: 25 MMOL/L
CREAT SERPL-MCNC: 1.79 MG/DL
EOSINOPHILS ABSOLUTE: 99 /ΜL
EOSINOPHILS RELATIVE PERCENT: 2.9 %
GLUCOSE FASTING: 93 MG/DL
HCT VFR BLD CALC: 37.4 % (ref 41–53)
HEMOGLOBIN: 12.6 G/DL (ref 13.5–17.5)
LYMPHOCYTES ABSOLUTE: 1472 /ΜL
LYMPHOCYTES RELATIVE PERCENT: 43.3 %
MAGNESIUM: 1.5 MG/DL
MCH RBC QN AUTO: 28.7 PG
MCHC RBC AUTO-ENTMCNC: 33.7 G/DL
MCV RBC AUTO: 85.2 FL
MONOCYTES ABSOLUTE: 435 /ΜL
MONOCYTES RELATIVE PERCENT: 12.8 %
NEUTROPHILS ABSOLUTE: 1374 /ΜL
NEUTROPHILS RELATIVE PERCENT: 40.4 %
PDW BLD-RTO: 12.8 %
PLATELET # BLD: 206 K/ΜL
PMV BLD AUTO: 10.4 FL
POTASSIUM SERPL-SCNC: 5 MMOL/L
RBC # BLD: 4.39 10^6/ΜL
SODIUM BLD-SCNC: 140 MMOL/L
TOTAL PROTEIN: 9.2 G/DL (ref 6.4–8.2)
WBC # BLD: 3.4 10^3/ML

## 2023-03-16 ENCOUNTER — OFFICE VISIT (OUTPATIENT)
Dept: SURGERY | Age: 48
End: 2023-03-16
Payer: COMMERCIAL

## 2023-03-16 VITALS
BODY MASS INDEX: 32.28 KG/M2 | OXYGEN SATURATION: 97 % | DIASTOLIC BLOOD PRESSURE: 95 MMHG | SYSTOLIC BLOOD PRESSURE: 133 MMHG | HEART RATE: 72 BPM | WEIGHT: 213 LBS | HEIGHT: 68 IN | TEMPERATURE: 98 F

## 2023-03-16 DIAGNOSIS — Z12.12 SCREENING FOR COLORECTAL CANCER: Primary | ICD-10-CM

## 2023-03-16 DIAGNOSIS — I10 PRIMARY HYPERTENSION: ICD-10-CM

## 2023-03-16 DIAGNOSIS — Z12.11 SCREENING FOR COLORECTAL CANCER: Primary | ICD-10-CM

## 2023-03-16 DIAGNOSIS — Z94.4 HISTORY OF LIVER TRANSPLANT (HCC): Chronic | ICD-10-CM

## 2023-03-16 PROBLEM — R03.0 ELEVATED BLOOD PRESSURE READING WITHOUT DIAGNOSIS OF HYPERTENSION: Status: RESOLVED | Noted: 2022-07-12 | Resolved: 2023-03-16

## 2023-03-16 PROBLEM — E66.01 MORBID OBESITY WITH BMI OF 40.0-44.9, ADULT (HCC): Status: RESOLVED | Noted: 2022-02-07 | Resolved: 2023-03-16

## 2023-03-16 PROBLEM — R91.8 PULMONARY MASS: Status: RESOLVED | Noted: 2022-02-07 | Resolved: 2023-03-16

## 2023-03-16 PROBLEM — F10.11 HISTORY OF ALCOHOL ABUSE: Chronic | Status: ACTIVE | Noted: 2022-02-07

## 2023-03-16 PROCEDURE — 4004F PT TOBACCO SCREEN RCVD TLK: CPT | Performed by: SURGERY

## 2023-03-16 PROCEDURE — 3080F DIAST BP >= 90 MM HG: CPT | Performed by: SURGERY

## 2023-03-16 PROCEDURE — 99203 OFFICE O/P NEW LOW 30 MIN: CPT | Performed by: SURGERY

## 2023-03-16 PROCEDURE — G8427 DOCREV CUR MEDS BY ELIG CLIN: HCPCS | Performed by: SURGERY

## 2023-03-16 PROCEDURE — 99212 OFFICE O/P EST SF 10 MIN: CPT | Performed by: SURGERY

## 2023-03-16 PROCEDURE — G8484 FLU IMMUNIZE NO ADMIN: HCPCS | Performed by: SURGERY

## 2023-03-16 PROCEDURE — 3075F SYST BP GE 130 - 139MM HG: CPT | Performed by: SURGERY

## 2023-03-16 PROCEDURE — G8417 CALC BMI ABV UP PARAM F/U: HCPCS | Performed by: SURGERY

## 2023-03-16 RX ORDER — MYCOPHENOLATE MOFETIL 500 MG/1
TABLET ORAL
COMMUNITY
Start: 2023-03-07

## 2023-03-16 RX ORDER — SODIUM CHLORIDE 0.9 % (FLUSH) 0.9 %
10 SYRINGE (ML) INJECTION PRN
OUTPATIENT
Start: 2023-03-16

## 2023-03-16 RX ORDER — SODIUM CHLORIDE 9 MG/ML
25 INJECTION, SOLUTION INTRAVENOUS PRN
OUTPATIENT
Start: 2023-03-16

## 2023-03-16 RX ORDER — SODIUM CHLORIDE 0.9 % (FLUSH) 0.9 %
10 SYRINGE (ML) INJECTION EVERY 12 HOURS SCHEDULED
OUTPATIENT
Start: 2023-03-16

## 2023-03-16 RX ORDER — BISACODYL 5 MG
TABLET, DELAYED RELEASE (ENTERIC COATED) ORAL
Qty: 8 TABLET | Refills: 0 | Status: SHIPPED | OUTPATIENT
Start: 2023-03-16

## 2023-03-16 RX ORDER — POLYETHYLENE GLYCOL 3350 17 G/17G
POWDER, FOR SOLUTION ORAL
Qty: 238 G | Refills: 0 | Status: SHIPPED | OUTPATIENT
Start: 2023-03-16

## 2023-03-16 RX ORDER — MAGNESIUM CITRATE
SOLUTION, ORAL ORAL
Qty: 1500 ML | Refills: 0 | Status: CANCELLED | OUTPATIENT
Start: 2023-03-16

## 2023-03-16 RX ORDER — SODIUM CHLORIDE, SODIUM LACTATE, POTASSIUM CHLORIDE, CALCIUM CHLORIDE 600; 310; 30; 20 MG/100ML; MG/100ML; MG/100ML; MG/100ML
INJECTION, SOLUTION INTRAVENOUS CONTINUOUS
OUTPATIENT
Start: 2023-03-16

## 2023-03-16 NOTE — PROGRESS NOTES
History and Physical    Patient's Name/Date of Birth: Gricel Alonzo /1975, (41 y.o.), male    Date: March 16, 2023     Assessment/Plan:  Colorectal Cancer Screening - I recommended low risk screening colonoscopy with possible biopsy or polypectomy and explained the risk, benefits, expected outcome, and alternatives to the procedure. Risks included but are not limited to bleeding, infection, respiratory distress, hypotension, and perforation of the colon. The patient understands and is in agreement. Primary Hypertension - patient is on medications for this and his BP was mildly elevated today at 133/95  History of Liver Transplant  History of alcohol abuse  Anxiety    Chief Complaint   Patient presents with    Colonoscopy     Patient here today for colonoscopy consult. HPI:   Patient referred for colonoscopy. Patient had no prior colonoscopies. Patient had  no constipation or diarrhea. The patient denied any abdominal pain. The patient had no bleeding associated with bowel movements. The patient denied nausea, vomiting, heartburn, or indigestion. The patient did not have a family history of colon polyps. The patient did not have a family history of colon cancer. Patient has a history of alcoholic liver disease and is approximate 1 year status post liver transplant. Patient seems to be doing well from this standpoint. No past medical history on file.     Past Surgical History:   Procedure Laterality Date    IR TIPS INSERTION  02/07/2022    IR TIPS INSERTION 2/7/2022 Yusef Greenwood MD [facility] SPECIAL PROCEDURES    LIVER TRANSPLANT  03/22/2022    [place], [place]    UPPER GASTROINTESTINAL ENDOSCOPY N/A 02/07/2022    EGD ESOPHAGOGASTRODUODENOSCOPY ESOPHAGEAL BANDING, , INSERTION OF SENGSTAKEN-BLAKEMORE TUBE performed by Yumiko Patrick MD at [facility] OR       Current Outpatient Medications   Medication Sig Dispense Refill    mycophenolate (CELLCEPT) 500 MG tablet       famotidine (PEPCID) 20 MG tablet Take 1 tablet by mouth 2 times daily as needed (acid reflux) 60 tablet 1    tacrolimus (PROGRAF) 5 MG capsule Take 10 mg by mouth 2 times daily      magnesium oxide (MAG-OX) 400 MG tablet Take 400 mg by mouth daily      Cholecalciferol (VITAMIN D3) 1.25 MG (05217 UT) CAPS Take 5,000 Units by mouth daily      amLODIPine (NORVASC) 5 MG tablet       thiamine 100 MG tablet Take 100 mg by mouth daily       No current facility-administered medications for this visit. No Known Allergies    Review of Systems  Non-contributory    Physical Exam:  Vitals:    03/16/23 1534   BP: (!) 133/95   Pulse: 72   Temp: 98 °F (36.7 °C)   SpO2: 97%   Weight: 213 lb (96.6 kg)   Height: 5' 8\" (1.727 m)     Body mass index is 32.39 kg/m². Physical Exam  Vitals reviewed. Constitutional:       General: He is not in acute distress. Appearance: He is well-developed. He is not diaphoretic. HENT:      Head: Normocephalic and atraumatic. Cardiovascular:      Rate and Rhythm: Normal rate and regular rhythm. Heart sounds: Normal heart sounds. No murmur heard. No friction rub. No gallop. Pulmonary:      Effort: Pulmonary effort is normal. No respiratory distress. Breath sounds: Normal breath sounds. No stridor. No wheezing or rales. Abdominal:      General: Bowel sounds are normal. There is no distension. Palpations: Abdomen is soft. There is no mass. Tenderness: There is no abdominal tenderness. There is no guarding or rebound. Hernia: There is no hernia in the ventral area, left inguinal area or right inguinal area. Comments: Chevron incision in upper abdomen with superior longitudinal incision superiorly up to the xyphoid. No incisional hernia   Musculoskeletal:         General: No tenderness. Normal range of motion. Skin:     General: Skin is warm and dry. Neurological:      Mental Status: He is alert and oriented to person, place, and time.    Psychiatric:         Behavior: Behavior normal.         Judgment: Judgment normal.     Electronically signed by Joi Armendariz MD on 3/16/23 at 4:15 PM EDT

## 2023-03-16 NOTE — PATIENT INSTRUCTIONS
Dr. Cornell Ayala recommended colonoscopy with possible biopsy or polypectomy and he explained the risk, benefits, expected outcome, and alternatives to the procedure. Risks included but are not limited to bleeding, infection, respiratory distress, hypotension, and perforation of the colon. You understood and were in agreement. You will need to have someone bring you to the hospital and take you home because you will not be able to drive or work the rest of that day. Also, you need to have someone stay with you the rest of the day to make sure you do not develop any complications. [address] [facility] TABLET PREP  COLON PREP FOR COLONOSCOPY OR COLON SURGERY    It is very important that you follow all of the instructions listed on this sheet carefully (they may be slightly different than the directions on the product that you purchase at the pharmacy) to ensure that your colon is adequately cleaned out or your risk of complications could be increased. 2 Days or More Before Endoscopy:  Obtain Miralax powder 238 gm (8.3 oz) bottle, Dulcolax tablets, and three 20 oz bottles of Gatorade from the pharmacy. Do not eat corn, tomatoes, peas or watermelon 5 days before procedure. Two days before the colonoscopy, pour all three 20 oz bottles of Gatorade into a large pitcher and add all 238 gm of Miralax to the pitcher then mix very well and re-mix every time pouring an 8 oz glass from the pitcher. Put the pitcher in the refrigerator to get cold  If you are on INSULIN or OTHER DIABETIC MEDICATIONS, then check with your primary care physician as to how to adjust your medication while on clear liquid diet and when nothing by mouth. 1 Day Before the Endoscopy:  No solid food - only clear liquids (soup, jello, or juice that you can see through with no solid food) for breakfast, lunch and supper.   DO NOT drink or eat anything that is red as it will turn the inside of the colon red and look like blood. 8:00 am Breakfast - all clear liquids  10:00 am Drink at least 8 oz of clear liquids. 12 Noon Lunch - all clear liquids and take 4 Dulcolax tablets followed immediately by at least 8 oz of clear liquids. 2:00 pm Drink 8 oz of the Gatorade/Miralax mix every 30 minutes x six glasses (should have 12 oz left)  6:00 pm Dinner - all clear liquids and take 4 Dulcolax tablets followed immediately by at least 8 oz of clear liquids. 8:00 pm Drink at least 8 oz of clear liquids. 10:00 pm Drink at least 8 oz of clear liquids. Can continue to take liquids until 12 midnight then nothing to eat or drink except as instructed below    Day of Endoscopy:  5 hours prior to scheduled time for colonoscopy, drink the last 12 oz of Gatorade with Miralax followed immediately by at least 8 oz of clear liquids. Then nothing to drink after that. If any blood pressure medications or heart medications are due in the morning, you should take them with a sip of water. Patient Information and Instructions for Colonoscopy         Definition of Colonoscopy   A colonoscopy is the visual exam of the rectum and colon (large intestine). The exam is done with a tool called a colonoscope. The colonoscope is a flexible tube with a tiny camera on the end. This instrument allows the doctor to view the inside of your rectum and colon. Sigmoidoscopy is a shorter scope that views only the last one third of the colon. Reasons for Colonoscopy   It is used to examine, diagnose, and treat problems in your large intestine. The procedure is most often done for the following reasons:    To determine the cause of abdominal pain, rectal bleeding, or a change in bowel habits   To detect and treat colon cancer or colon polyps   To obtain tissue samples for testing   To stop intestinal bleeding   Monitor response to treatment if you have inflammatory bowel disease     Possible Complications   Complications are rare, but no procedure is completely free of risk. If you are planning to have a colonoscopy, your doctor will review a list of possible complications, which may include:   Bleeding   Reaction to the sedation causing drop in your blood pressure or problems breathing  Perforation or puncture of the bowel     Factors that may increase the risk of complications include:   Pre-existing heart or kidney condition   Treatment with certain medicines, including aspirin and other drugs with anticoagulant or blood-thinning properties   Prior abdominal surgery or radiation treatments   Active colitis , diverticulitis , or other acute bowel disease   Previous treatment with radiation therapy     Be sure to discuss these risks with your doctor before the procedure. What to Expect   Prior to Procedure   Your doctor will likely do the following:   Physical exam   Health history   Review of medicines   Test your stool for hidden blood (called \"occult blood\")     Your colon must be completely clean before the procedure. Any stool left in the intestine will block the view. This preparation may start several days before the procedure. Follow your doctor's instructions. Leading up to your procedure:   Talk to your doctor about your medicines. You may be asked to stop taking some medicines up to one week before the procedure, like:   Anti-inflammatory drugs (e.g., aspirin )   Blood thinners like clopidogrel (Plavix) or warfarin (Coumadin)   Iron supplements or vitamins containing iron   The day or days before your procedure, go on a clear liquid diet (clear broth, clear juice, clear jello) with no red coloring  Do not eat or drink anything after midnight. Wear comfortable clothing. If you have diabetes, ask your doctor if you need to adjust your diabetes medicine on the day prior to your procedure and the day of your procedure. Arrange for a ride home after the procedure.      Anesthesia   You will receive intravenous sedation medicine for the procedure so you will not feel anything during the procedure. Description of the Procedure   You will lie on your left side with knees bent and drawn up toward your chest. The colonoscope will be slowly inserted through the rectum and into the bowel. The colonoscope will inject air into the colon. A small attached video camera will allow the doctor to view the colon's lining on a screen. The doctor will continue guiding the tool through the bowel and assess the lining. A tissue sample or polyps may be removed during the procedure. How Long Will It Take? Usually it takes about 30 to 45 minutes     Will It Hurt? Most people do not feel anything during the procedure and will not remember the procedure. After the procedure, gas pains and cramping are common. These pains should go away with the passing of gas. Post-procedure Care   If any tissue was removed: It will be sent to a lab to be examined. It may take 1-2 weeks for results. The doctor will usually give an initial report after the scope is removed. Other tests may be recommended. A small amount of bleeding may occur during the first few days after the procedure. When you return home after the procedure, be sure to follow your doctor's instructions, which may include:   Resume medicines as instructed by your doctor. Resume normal diet, unless directed otherwise by your doctor. The sedative will make you drowsy. Avoid driving, operating machinery, or making important decisions for the rest of the day. Rest for the remainder of the day. After arriving home, contact your doctor if any of the following occurs:   Bleeding from your rectum, notify your doctor if you pass a teaspoonful of blood or more.    Black, tarry stools   Severe abdominal pain   Hard, swollen abdomen   Signs of infection, including fever or chills   Inability to pass gas or stool   Coughing, shortness of breath, chest pain, severe nausea or vomiting     In case of an emergency, CALL 911 .

## 2023-03-16 NOTE — Clinical Note
See my office note from today on your patient. Thanks!!   Electronically signed by Jerson Alarcon MD on 3/16/2023 at 5:40 PM

## 2023-03-23 ENCOUNTER — TELEPHONE (OUTPATIENT)
Dept: SURGERY | Age: 48
End: 2023-03-23

## 2023-03-23 NOTE — TELEPHONE ENCOUNTER
Patient is scheduled for colonoscopy with   on 23 (per pt request) at 8:30 am with an arrival time of 7:30 am. Pt accepted date and time and verbalized understanding. Procedure letter mailed to patient as well. Prior Authorization Form:      DEMOGRAPHICS:                     Patient Name:  Esther Pretty  Patient :  1975            Insurance:  Payor: [employer] / Plan: [place]  [id number] / Product Type: *No Product type* /   Insurance ID Number:    Payer/Plan Subscr  Sex Relation Sub. Ins. ID Effective Group Num   1.  [address] 1971 Female Spouse [id number]                                   [id number]         DIAGNOSIS & PROCEDURE:                       Procedure/Operation: COLONOSCOPY           CPT Code: 33443    Diagnosis:  SCREENING FOR COLON CANCER    ICD10 Code: Z12.11    Location:  [address] [place] [address] [place]    Surgeon:  DR. Gloria Babcock    SCHEDULING INFORMATION:                          Date: 23    Time: 8:30 AM              Anesthesia:  MAC/TIVA                                                       Status:  Outpatient        Special Comments:  N/A       Electronically signed by Mariah De Jesus on 3/23/2023 at 3:22 PM

## 2023-04-18 LAB
ALBUMIN SERPL-MCNC: 4.4 G/DL
ALP BLD-CCNC: 73 U/L
ALT SERPL-CCNC: 11 U/L
AST SERPL-CCNC: 19 U/L
BASOPHILS ABSOLUTE: 0 /ΜL
BASOPHILS RELATIVE PERCENT: 0 %
BILIRUB SERPL-MCNC: 0.4 MG/DL (ref 0.1–1.4)
BUN BLDV-MCNC: 30 MG/DL
CALCIUM SERPL-MCNC: 9 MG/DL
CHLORIDE BLD-SCNC: 106 MMOL/L
CO2: 25 MMOL/L
CREAT SERPL-MCNC: 1.65 MG/DL
EOSINOPHILS ABSOLUTE: 195 /ΜL
EOSINOPHILS RELATIVE PERCENT: 5 %
GLUCOSE FASTING: 86 MG/DL
HCT VFR BLD CALC: 37.1 % (ref 41–53)
HEMOGLOBIN: 12.4 G/DL (ref 13.5–17.5)
LYMPHOCYTES ABSOLUTE: 1053 /ΜL
LYMPHOCYTES RELATIVE PERCENT: 27 %
MAGNESIUM: 1.9 MG/DL
MCH RBC QN AUTO: 28.7 PG
MCHC RBC AUTO-ENTMCNC: 33.4 G/DL
MCV RBC AUTO: 85.9 FL
MONOCYTES ABSOLUTE: 312 /ΜL
MONOCYTES RELATIVE PERCENT: 8 %
NEUTROPHILS ABSOLUTE: 1443 /ΜL
NEUTROPHILS RELATIVE PERCENT: 19 %
PDW BLD-RTO: 13.4 %
PLATELET # BLD: 159 K/ΜL
PMV BLD AUTO: 10.6 FL
POTASSIUM SERPL-SCNC: 5.1 MMOL/L
RBC # BLD: 4.32 10^6/ΜL
SODIUM BLD-SCNC: 139 MMOL/L
TOTAL PROTEIN: 7.1 G/DL (ref 6.4–8.2)
WBC # BLD: 3.9 10^3/ML

## 2023-04-24 RX ORDER — FAMOTIDINE 20 MG/1
20 TABLET, FILM COATED ORAL 2 TIMES DAILY PRN
Qty: 180 TABLET | Refills: 1 | Status: SHIPPED | OUTPATIENT
Start: 2023-04-24

## 2023-05-15 ENCOUNTER — TELEPHONE (OUTPATIENT)
Dept: INTERNAL MEDICINE | Age: 48
End: 2023-05-15

## 2023-05-15 NOTE — TELEPHONE ENCOUNTER
Detailed message left on patient's voicemail asking him to return my call to schedule appointment with Dr. Ze Cabral.   Anh Pearl LPN

## 2023-06-01 ENCOUNTER — ANESTHESIA (OUTPATIENT)
Dept: ENDOSCOPY | Age: 48
End: 2023-06-01
Payer: COMMERCIAL

## 2023-06-01 ENCOUNTER — ANESTHESIA EVENT (OUTPATIENT)
Dept: ENDOSCOPY | Age: 48
End: 2023-06-01
Payer: COMMERCIAL

## 2023-06-01 ENCOUNTER — HOSPITAL ENCOUNTER (OUTPATIENT)
Age: 48
Setting detail: OUTPATIENT SURGERY
Discharge: HOME | End: 2023-06-01
Attending: SURGERY | Admitting: SURGERY
Payer: COMMERCIAL

## 2023-06-01 VITALS
HEIGHT: 68 IN | BODY MASS INDEX: 30.77 KG/M2 | TEMPERATURE: 97.6 F | OXYGEN SATURATION: 97 % | RESPIRATION RATE: 16 BRPM | DIASTOLIC BLOOD PRESSURE: 93 MMHG | HEART RATE: 68 BPM | SYSTOLIC BLOOD PRESSURE: 123 MMHG | WEIGHT: 203 LBS

## 2023-06-01 PROBLEM — Z12.12 SCREENING FOR COLORECTAL CANCER: Status: ACTIVE | Noted: 2023-06-01

## 2023-06-01 PROBLEM — K57.30 DIVERTICULOSIS OF COLON WITHOUT DIVERTICULITIS: Status: ACTIVE | Noted: 2023-06-01

## 2023-06-01 PROBLEM — Z12.11 SCREENING FOR COLORECTAL CANCER: Status: ACTIVE | Noted: 2023-06-01

## 2023-06-01 PROCEDURE — 2580000003 HC RX 258: Performed by: INTERNAL MEDICINE

## 2023-06-01 PROCEDURE — 3700000001 HC ADD 15 MINUTES (ANESTHESIA): Performed by: SURGERY

## 2023-06-01 PROCEDURE — 2580000003 HC RX 258: Performed by: SURGERY

## 2023-06-01 PROCEDURE — 7100000010 HC PHASE II RECOVERY - FIRST 15 MIN: Performed by: SURGERY

## 2023-06-01 PROCEDURE — 3700000000 HC ANESTHESIA ATTENDED CARE: Performed by: SURGERY

## 2023-06-01 PROCEDURE — 2709999900 HC NON-CHARGEABLE SUPPLY: Performed by: SURGERY

## 2023-06-01 PROCEDURE — 7100000011 HC PHASE II RECOVERY - ADDTL 15 MIN: Performed by: SURGERY

## 2023-06-01 PROCEDURE — G0121 COLON CA SCRN NOT HI RSK IND: HCPCS | Performed by: SURGERY

## 2023-06-01 PROCEDURE — 6360000002 HC RX W HCPCS: Performed by: INTERNAL MEDICINE

## 2023-06-01 PROCEDURE — 3609027000 HC COLONOSCOPY: Performed by: SURGERY

## 2023-06-01 RX ORDER — SODIUM CHLORIDE 9 MG/ML
25 INJECTION, SOLUTION INTRAVENOUS PRN
Status: DISCONTINUED | OUTPATIENT
Start: 2023-06-01 | End: 2023-06-01 | Stop reason: HOSPADM

## 2023-06-01 RX ORDER — PROPOFOL 10 MG/ML
INJECTION, EMULSION INTRAVENOUS CONTINUOUS PRN
Status: DISCONTINUED | OUTPATIENT
Start: 2023-06-01 | End: 2023-06-01 | Stop reason: SDUPTHER

## 2023-06-01 RX ORDER — PROPOFOL 10 MG/ML
INJECTION, EMULSION INTRAVENOUS PRN
Status: DISCONTINUED | OUTPATIENT
Start: 2023-06-01 | End: 2023-06-01 | Stop reason: SDUPTHER

## 2023-06-01 RX ORDER — SODIUM CHLORIDE, SODIUM LACTATE, POTASSIUM CHLORIDE, CALCIUM CHLORIDE 600; 310; 30; 20 MG/100ML; MG/100ML; MG/100ML; MG/100ML
INJECTION, SOLUTION INTRAVENOUS CONTINUOUS PRN
Status: DISCONTINUED | OUTPATIENT
Start: 2023-06-01 | End: 2023-06-01 | Stop reason: SDUPTHER

## 2023-06-01 RX ORDER — SODIUM CHLORIDE 0.9 % (FLUSH) 0.9 %
10 SYRINGE (ML) INJECTION PRN
Status: DISCONTINUED | OUTPATIENT
Start: 2023-06-01 | End: 2023-06-01 | Stop reason: HOSPADM

## 2023-06-01 RX ORDER — SODIUM CHLORIDE, SODIUM LACTATE, POTASSIUM CHLORIDE, CALCIUM CHLORIDE 600; 310; 30; 20 MG/100ML; MG/100ML; MG/100ML; MG/100ML
INJECTION, SOLUTION INTRAVENOUS CONTINUOUS
Status: DISCONTINUED | OUTPATIENT
Start: 2023-06-01 | End: 2023-06-01 | Stop reason: HOSPADM

## 2023-06-01 RX ORDER — SODIUM CHLORIDE 0.9 % (FLUSH) 0.9 %
10 SYRINGE (ML) INJECTION EVERY 12 HOURS SCHEDULED
Status: DISCONTINUED | OUTPATIENT
Start: 2023-06-01 | End: 2023-06-01 | Stop reason: HOSPADM

## 2023-06-01 RX ADMIN — PROPOFOL 150 MCG/KG/MIN: 10 INJECTION, EMULSION INTRAVENOUS at 08:16

## 2023-06-01 RX ADMIN — PROPOFOL 75 MG: 10 INJECTION, EMULSION INTRAVENOUS at 08:16

## 2023-06-01 RX ADMIN — SODIUM CHLORIDE, POTASSIUM CHLORIDE, SODIUM LACTATE AND CALCIUM CHLORIDE: 600; 310; 30; 20 INJECTION, SOLUTION INTRAVENOUS at 07:53

## 2023-06-01 RX ADMIN — SODIUM CHLORIDE, POTASSIUM CHLORIDE, SODIUM LACTATE AND CALCIUM CHLORIDE: 600; 310; 30; 20 INJECTION, SOLUTION INTRAVENOUS at 08:09

## 2023-06-01 ASSESSMENT — PAIN SCALES - GENERAL: PAINLEVEL_OUTOF10: 0

## 2023-06-01 ASSESSMENT — PAIN - FUNCTIONAL ASSESSMENT: PAIN_FUNCTIONAL_ASSESSMENT: NONE - DENIES PAIN

## 2023-06-01 NOTE — DISCHARGE INSTRUCTIONS
Findings of colonoscopy:  Colorectal Cancer Screening - there were no colon polyps or tumors seen. Dr. Wilber Erickson recommended  repeat colonoscopy in 10 years  Uncomplicated Diverticulosis - maintain regular bowel habits and avoid constipation, hard stools, and excessive straining with stools (see below for further information)    Diverticulosis     Definition   A pouch that forms in the wall of the large intestine is called a diverticulum. When it becomes infected or inflamed it is called diverticulitis. Diverticulitis       Infected pouches along the colon. [address] [place] [address].     Causes   It is not clear why the pouches form. Doctors believe a constant pressure is built up when food moves too slowly through the bowel. This pressure increases then pushes along the side walls creating pouches. Digested food or stool can become trapped in one of the pouches. This leads to inflammation and infection. The following may contribute to diverticulitis:   Low-fiber diet- Fiber softens stools and makes them pass through the bowel more easily   Increased pressure in the bowel from straining to pass a hard stool   Defects in the colon wall   Chronic constipation     Risk Factors   Factor that increases your chance of getting diverticulitis include:   Eating a low-fiber diet   Age: 48 or older   Previous episodes of diverticulitis   High meat or protein diet   Chronic constipation     Symptoms   Symptoms can come on suddenly. They vary depending on the degree of the infection. Symptoms include:   Abdominal pain   Tenderness, usually in the left lower abdomen   Swollen and hard abdomen   Fever   Chills   Poor appetite   Nausea   Vomiting   Diarrhea   Constipation   Both diarrhea and constipation   Cramping   Rectal bleeding     Diagnosis   The doctor will ask about your symptoms and medical history. A physical and rectal exam will be done. Finding the disease early is important.  The pouch can break,

## 2023-06-01 NOTE — ANESTHESIA POSTPROCEDURE EVALUATION
Department of Anesthesiology  Postprocedure Note    Patient: Mayr Kulkarni  MRN: [id number]  YOB: 1975  Date of evaluation: 6/1/2023      Procedure Summary     Date: 06/01/23 Room / Location: [address] [place] [address] / [facility]    Anesthesia Start: 9306 Anesthesia Stop: 0848    Procedure: COLORECTAL CANCER SCREENING, NOT HIGH RISK Diagnosis:       Special screening for malignant neoplasms, colon      (/)    Surgeons: Elaine Myers MD Responsible Provider: Yuliya Aguilar MD    Anesthesia Type: MAC ASA Status: 2          Anesthesia Type: No value filed. Glory Phase I: Glory Score: 10    Glory Phase II:        Anesthesia Post Evaluation    Patient location during evaluation: PACU  Patient participation: complete - patient participated  Level of consciousness: awake and alert  Airway patency: patent  Nausea & Vomiting: no nausea and no vomiting  Complications: no  Cardiovascular status: hemodynamically stable  Respiratory status: acceptable  Hydration status: euvolemic  There was medical reason for not using a multimodal analgesia pain management approach.

## 2023-06-01 NOTE — OP NOTE
PROCEDURE NOTE    DATE OF PROCEDURE: 6/1/2023    SURGEON: Lulu Connell M.D.    ASSISTANT: None    PREOPERATIVE DIAGNOSIS: Low risk colorectal cancer screening    POSTOPERATIVE DIAGNOSIS: Same with small to large sized sigmoid diverticulosis from 20 to 40 cm from anus without diverticulitis    OPERATION: Total colonoscopy     ANESTHESIA: Local monitored anesthesia. ESTIMATED BLOOD LOSS: less than 50     COMPLICATIONS: None. SPECIMENS:  Was Not Obtained    HISTORY: The patient is a 41y.o. year old male with history of above preop diagnosis. I recommended colonoscopy with possible biopsy or polypectomy and I explained the risk, benefits, expected outcome, and alternatives to the procedure. Risks included but are not limited to bleeding, infection, respiratory distress, hypotension, and perforation of the colon. The patient understands and is in agreement. PROCEDURE: The patient was given IV conscious sedation per anesthesia. The patient was given supplemental oxygen by nasal cannula. The colonoscope was inserted per rectum and advanced under direct vision to the cecum without difficulty. The prep was good so exam was adequate. FINDINGS:  Cecum/Ascending colon: normal    Transverse colon: normal    Descending/Sigmoid colon: abnormal: Multiple small to large sized diverticula sigmoid colon 20 to 40 cm from anus with 1 diverticula having impacted stool but there is no evidence of any diverticulitis    Rectum/Anus: examined in normal and retroflexed positions and was normal    The colon was decompressed and the scope was removed. The withdraw time was approximately 10 minutes. The patient tolerated the procedure well.      ASSESSMENT/PLAN:   Colorectal Cancer Screening - recommend repeat colonoscopy in 10 years  Uncomplicated Diverticulosis - maintain regular bowel habits and avoid constipation, hard stools, and excessive straining with stools    Electronically signed by Clark Reyes MD on

## 2023-06-01 NOTE — ANESTHESIA PRE PROCEDURE
Interval History 12/27/2018:  Patient is seen for follow-up rehab evaluation and recommendations: [place] tube in place with TFs infusing. Patient with more energy.     HPI, Past Medical, Family, and Social History remains the same as documented in the initial encounter.    Scheduled Medications:    aspirin  81 mg Oral Daily    epoetin sherlyn (PROCRIT) injection  50 Units/kg (Dosing Weight) Intravenous Every Mon, Wed, Fri    ergocalciferol  50,000 Units Oral Q7 Days    escitalopram oxalate  20 mg Oral Daily    heparin (porcine)  5,000 Units Subcutaneous Q8H    isavuconazonium sulfate  372 mg Oral Daily    lidocaine  1 patch Transdermal Q24H    metoprolol  12.5 mg Per NG tube BID    mirtazapine  7.5 mg Oral QHS    pantoprazole  40 mg Intravenous BID    sulfamethoxazole-trimethoprim 400-80mg  1 tablet Oral Every Mon, Wed, Fri    tacrolimus  2 mg Sublingual BID    ursodiol  300 mg Oral BID    valganciclovir 50 mg/ml  100 mg Oral Once per day on Mon Wed Fri       Diagnostic Results: Labs: Reviewed    PRN Medications: sodium chloride, sodium chloride 0.9%, acetaminophen, albuterol-ipratropium, artificial tears, bacitracin, bisacodyl, dextrose 50%, dextrose 50%, glucagon (human recombinant), glucose, glucose, heparin (porcine), hydrALAZINE, midodrine, ondansetron, ramelteon, tiZANidine, traMADol    Review of Systems   Constitutional: Positive for activity change and fatigue. Negative for fever.   HENT: Negative for trouble swallowing and voice change.    Respiratory: Negative for cough and shortness of breath.    Cardiovascular: Negative for chest pain and palpitations.   Gastrointestinal: Positive for abdominal distention, abdominal pain and nausea. Negative for diarrhea.   Musculoskeletal: Positive for gait problem. Negative for arthralgias.   Skin: Positive for wound (surgical). Negative for color change.   Neurological: Positive for dizziness and weakness.     Objective:     Vital Signs (Most Recent):  Temp:  98.8 °F (37.1 °C) (12/27/18 1132)  Pulse: 89 (12/27/18 0715)  Resp: (!) 22 (12/27/18 0715)  BP: 128/86 (12/27/18 0715)  SpO2: 100 % (12/27/18 0715)    Vital Signs (24h Range):  Temp:  [97.9 °F (36.6 °C)-98.9 °F (37.2 °C)] 98.8 °F (37.1 °C)  Pulse:  [] 89  Resp:  [20-26] 22  SpO2:  [98 %-100 %] 100 %  BP: (111-128)/(70-86) 128/86     Physical Exam   Constitutional: He is oriented to person, place, and time. He appears well-developed and well-nourished.   Appears with more energy    HENT:   Head: Normocephalic and atraumatic.   Eyes: Right eye exhibits no discharge. Left eye exhibits no discharge. No scleral icterus.   Neck: Neck supple.   Cardiovascular: Normal rate and intact distal pulses.   Pulmonary/Chest: Effort normal. No respiratory distress.   Abdominal: Soft. He exhibits no distension.   Quentin tube in place   Musculoskeletal: He exhibits no edema or deformity.   RUE: 5/5.  LUE: 5/5.  RLE: 3/5.  LLE: 3/5.   Neurological: He is alert and oriented to person, place, and time. No sensory deficit.        Skin: Skin is warm and dry.   Psychiatric: He has a normal mood and affect. His behavior is normal.     NEUROLOGICAL EXAMINATION:     MENTAL STATUS   Oriented to person, place, and time.      2300                        Date of last liquid consumption: 06/01/23                        Date of last solid food consumption: 05/30/23    BMI:   Wt Readings from Last 3 Encounters:   06/01/23 203 lb (92.1 kg)   03/16/23 213 lb (96.6 kg)   02/20/23 200 lb (90.7 kg)     Body mass index is 30.87 kg/m². CBC:   Lab Results   Component Value Date/Time    WBC 3.9 04/14/2023 07:34 AM    RBC 4.32 04/14/2023 07:34 AM    HGB 12.4 04/14/2023 07:34 AM    HCT 37.1 04/14/2023 07:34 AM    MCV 85.9 04/14/2023 07:34 AM    RDW 13.4 04/14/2023 07:34 AM     04/14/2023 07:34 AM       CMP:   Lab Results   Component Value Date/Time     04/14/2023 07:34 AM    K 5.1 04/14/2023 07:34 AM    K 3.7 02/22/2022 04:33 AM     04/14/2023 07:34 AM    CO2 25 04/14/2023 07:34 AM    BUN 30 04/14/2023 07:34 AM    CREATININE 1.65 04/14/2023 07:34 AM    GFRAA 50 07/06/2022 09:40 AM    LABGLOM 41 07/06/2022 09:40 AM    GLUCOSE 101 07/06/2022 09:40 AM    PROT 7.1 04/14/2023 07:34 AM    CALCIUM 9.0 04/14/2023 07:34 AM    BILITOT 0.4 04/14/2023 07:34 AM    ALKPHOS 73 04/14/2023 07:34 AM    AST 19 04/14/2023 07:34 AM    ALT 11 04/14/2023 07:34 AM       POC Tests: No results for input(s): POCGLU, POCNA, POCK, POCCL, POCBUN, POCHEMO, POCHCT in the last 72 hours.     Coags:   Lab Results   Component Value Date/Time    PROTIME 10.2 01/11/2023 12:00 AM    INR 0.9 01/11/2023 12:00 AM    APTT 30.4 02/07/2022 06:10 AM       HCG (If Applicable): No results found for: PREGTESTUR, PREGSERUM, HCG, HCGQUANT     ABGs: No results found for: PHART, PO2ART, YYV9GHS, TAY9DQX, BEART, Q7TBTZHL     Type & Screen (If Applicable):  No results found for: LABABO, LABRH    Drug/Infectious Status (If Applicable):  No results found for: HIV, HEPCAB    COVID-19 Screening (If Applicable):   Lab Results   Component Value Date/Time    COVID19 Detected 02/20/2023 01:50 PM    COVID19 Not Detected 02/16/2022 02:45 PM           Anesthesia Evaluation  Patient summary

## 2023-06-01 NOTE — H&P
History and Physical    Patient's Name/Date of Birth: Osman Lua / 1975, [de-identified]41 y.o.), male    Date: June 1, 2023     Assessment/Plan:  Colorectal Cancer Screening - I recommended low risk screening colonoscopy with possible biopsy or polypectomy and explained the risk, benefits, expected outcome, and alternatives to the procedure. Risks included but are not limited to bleeding, infection, respiratory distress, hypotension, and perforation of the colon. The patient understands and is in agreement. Primary Hypertension  History of Liver Transplant  History of alcohol abuse  Anxiety    Chief Complaint: Colorectal cancer screening    HPI:   Patient was seen in the office on 3/16/2023 for scheduling for colonoscopy for low risk colorectal cancer screening. Patient never had a previous colonoscopy and denied any GI or abdominal complaints. His family history is negative for colon cancer or colon polyps. Patient has a history of liver transplant proxy 1 year ago for alcoholic liver disease. He is doing well from the standpoint. Patient is recommended colonoscopy is here today for this. He denies any change in his personal or family medical history since I last saw him.     Past Medical History:   Diagnosis Date    Cirrhosis of liver ([address]     Kidney failure     States once received liver transplant kidneys are no longer in failure       Past Surgical History:   Procedure Laterality Date    COLONOSCOPY  06/01/2023    Multiple small to large uncomplicated sigmoid diverticula from 20 to 40 cm from anus, Dr. Natasha Briggs, [facility]    IR [address]  02/07/2022    IR TIPS INSERTION 2/7/2022 Matthew Dunn MD [facility]    LIVER TRANSPLANT  03/22/2022    [place], [place]    UPPER GASTROINTESTINAL ENDOSCOPY N/A 02/07/2022    EGD ESOPHAGOGASTRODUODENOSCOPY ESOPHAGEAL BANDING, , INSERTION OF SENGSTAKEN-BLAKEMORE TUBE performed by Gustavo Salazar MD at [facility]

## 2023-06-06 ENCOUNTER — TELEPHONE (OUTPATIENT)
Dept: INTERNAL MEDICINE | Age: 48
End: 2023-06-06

## 2023-06-23 LAB
ALBUMIN SERPL-MCNC: 4.7 G/DL
ALP BLD-CCNC: 76 U/L
ALT SERPL-CCNC: 12 U/L
ANION GAP SERPL CALCULATED.3IONS-SCNC: ABNORMAL MMOL/L
AST SERPL-CCNC: 22 U/L
BASOPHILS ABSOLUTE: 0 /ΜL
BASOPHILS RELATIVE PERCENT: 0 %
BILIRUB SERPL-MCNC: 0.4 MG/DL (ref 0.1–1.4)
BUN BLDV-MCNC: 23 MG/DL
CALCIUM SERPL-MCNC: 9.5 MG/DL
CHLORIDE BLD-SCNC: 106 MMOL/L
CO2: 26 MMOL/L
CREAT SERPL-MCNC: 1.59 MG/DL
EGFR: 54
EOSINOPHILS ABSOLUTE: 41 /ΜL
EOSINOPHILS RELATIVE PERCENT: 1 %
GLUCOSE BLD-MCNC: 96 MG/DL
HCT VFR BLD CALC: 40.1 % (ref 41–53)
HEMOGLOBIN: 13.5 G/DL (ref 13.5–17.5)
LYMPHOCYTES ABSOLUTE: 1189 /ΜL
LYMPHOCYTES RELATIVE PERCENT: 29 %
MAGNESIUM: 1.8 MG/DL
MCH RBC QN AUTO: 28.7 PG
MCHC RBC AUTO-ENTMCNC: 33.7 G/DL
MCV RBC AUTO: 85.3 FL
MONOCYTES ABSOLUTE: 205 /ΜL
MONOCYTES RELATIVE PERCENT: 5 %
NEUTROPHILS ABSOLUTE: 1722 /ΜL
NEUTROPHILS RELATIVE PERCENT: 42 %
PDW BLD-RTO: 13.1 %
PLATELET # BLD: 165 K/ΜL
PMV BLD AUTO: 10.1 FL
POTASSIUM SERPL-SCNC: 5.2 MMOL/L
RBC # BLD: 4.7 10^6/ΜL
SODIUM BLD-SCNC: 140 MMOL/L
TOTAL PROTEIN: 7.5
WBC # BLD: 4.1 10^3/ML

## 2023-07-01 PROBLEM — Z12.12 SCREENING FOR COLORECTAL CANCER: Status: RESOLVED | Noted: 2023-06-01 | Resolved: 2023-07-01

## 2023-07-01 PROBLEM — Z12.11 SCREENING FOR COLORECTAL CANCER: Status: RESOLVED | Noted: 2023-06-01 | Resolved: 2023-07-01

## 2023-07-25 RX ORDER — ZOSTER VACCINE RECOMBINANT, ADJUVANTED 50 MCG/0.5
0.5 KIT INTRAMUSCULAR SEE ADMIN INSTRUCTIONS
Qty: 0.5 ML | Refills: 0 | Status: CANCELLED | OUTPATIENT
Start: 2023-07-25 | End: 2024-01-21

## 2023-07-26 ENCOUNTER — TELEPHONE (OUTPATIENT)
Dept: INTERNAL MEDICINE | Age: 48
End: 2023-07-26

## 2023-07-26 ENCOUNTER — OFFICE VISIT (OUTPATIENT)
Dept: INTERNAL MEDICINE | Age: 48
End: 2023-07-26
Payer: COMMERCIAL

## 2023-07-26 VITALS
DIASTOLIC BLOOD PRESSURE: 91 MMHG | WEIGHT: 211.7 LBS | SYSTOLIC BLOOD PRESSURE: 124 MMHG | HEART RATE: 67 BPM | TEMPERATURE: 98.6 F | BODY MASS INDEX: 31.36 KG/M2 | HEIGHT: 69 IN | RESPIRATION RATE: 18 BRPM | OXYGEN SATURATION: 96 %

## 2023-07-26 DIAGNOSIS — I10 PRIMARY HYPERTENSION: Primary | Chronic | ICD-10-CM

## 2023-07-26 DIAGNOSIS — K21.9 GASTROESOPHAGEAL REFLUX DISEASE WITHOUT ESOPHAGITIS: ICD-10-CM

## 2023-07-26 DIAGNOSIS — Z94.4 HISTORY OF LIVER TRANSPLANT (HCC): Chronic | ICD-10-CM

## 2023-07-26 PROCEDURE — 3080F DIAST BP >= 90 MM HG: CPT | Performed by: INTERNAL MEDICINE

## 2023-07-26 PROCEDURE — G8417 CALC BMI ABV UP PARAM F/U: HCPCS | Performed by: INTERNAL MEDICINE

## 2023-07-26 PROCEDURE — G8427 DOCREV CUR MEDS BY ELIG CLIN: HCPCS | Performed by: INTERNAL MEDICINE

## 2023-07-26 PROCEDURE — 99213 OFFICE O/P EST LOW 20 MIN: CPT | Performed by: INTERNAL MEDICINE

## 2023-07-26 PROCEDURE — 4004F PT TOBACCO SCREEN RCVD TLK: CPT | Performed by: INTERNAL MEDICINE

## 2023-07-26 PROCEDURE — 3074F SYST BP LT 130 MM HG: CPT | Performed by: INTERNAL MEDICINE

## 2023-07-26 RX ORDER — FAMOTIDINE 20 MG/1
20 TABLET, FILM COATED ORAL DAILY PRN
Qty: 90 TABLET | Refills: 1 | Status: SHIPPED | OUTPATIENT
Start: 2023-07-26

## 2023-07-26 RX ORDER — AMLODIPINE BESYLATE 10 MG/1
10 TABLET ORAL EVERY EVENING
Qty: 90 TABLET | Refills: 1 | Status: SHIPPED | OUTPATIENT
Start: 2023-07-26

## 2023-10-27 LAB
ALBUMIN SERPL-MCNC: 4.6 G/DL
ALP BLD-CCNC: 64 U/L
ALT SERPL-CCNC: 12 U/L
ANION GAP SERPL CALCULATED.3IONS-SCNC: ABNORMAL MMOL/L
AST SERPL-CCNC: 18 U/L
BASOPHILS ABSOLUTE: 20 /ΜL
BASOPHILS RELATIVE PERCENT: 0.6 %
BILIRUB SERPL-MCNC: 0.4 MG/DL (ref 0.1–1.4)
BUN BLDV-MCNC: 25 MG/DL
CALCIUM SERPL-MCNC: 9 MG/DL
CHLORIDE BLD-SCNC: 105 MMOL/L
CO2: 27 MMOL/L
CREAT SERPL-MCNC: 1.47 MG/DL
EGFR: 60
EOSINOPHILS ABSOLUTE: 61 /ΜL
EOSINOPHILS RELATIVE PERCENT: 1.8 %
GLUCOSE BLD-MCNC: 83 MG/DL
HCT VFR BLD CALC: 39.9 % (ref 41–53)
HEMOGLOBIN: 13.5 G/DL (ref 13.5–17.5)
LYMPHOCYTES ABSOLUTE: 1275 /ΜL
LYMPHOCYTES RELATIVE PERCENT: 37.5 %
MAGNESIUM: 1.8 MG/DL
MCH RBC QN AUTO: 28.5 PG
MCHC RBC AUTO-ENTMCNC: 33.8 G/DL
MCV RBC AUTO: 84.4 FL
MONOCYTES ABSOLUTE: 360 /ΜL
MONOCYTES RELATIVE PERCENT: 10.6 %
NEUTROPHILS ABSOLUTE: 1683 /ΜL
NEUTROPHILS RELATIVE PERCENT: 49.5 %
PDW BLD-RTO: 13.1 %
PLATELET # BLD: 175 K/ΜL
PMV BLD AUTO: 10.1 FL
POTASSIUM SERPL-SCNC: 4.6 MMOL/L
RBC # BLD: 4.73 10^6/ΜL
SODIUM BLD-SCNC: 140 MMOL/L
TOTAL PROTEIN: 7.2
WBC # BLD: 3.4 10^3/ML

## 2024-01-22 RX ORDER — AMLODIPINE BESYLATE 10 MG/1
10 TABLET ORAL EVERY EVENING
Qty: 90 TABLET | Refills: 1 | Status: SHIPPED | OUTPATIENT
Start: 2024-01-22

## 2024-07-02 LAB
BILIRUBIN, POC: NEGATIVE
BLOOD URINE, POC: NEGATIVE
CLARITY, POC: CLEAR
COLOR, POC: YELLOW
GLUCOSE URINE, POC: NEGATIVE
KETONES, POC: NEGATIVE
LEUKOCYTE EST, POC: NEGATIVE
NITRITE, POC: NEGATIVE
PH, POC: 5.5
PROTEIN, POC: NEGATIVE
SPECIFIC GRAVITY, POC: 1.01
UROBILINOGEN, POC: 0.2

## 2024-07-02 NOTE — PROGRESS NOTES
Current Outpatient Medications on File Prior to Visit   Medication Sig Dispense Refill    tacrolimus (PROGRAF) 1 MG capsule Take 3 capsules by mouth 2 times daily      amLODIPine (NORVASC) 10 MG tablet TAKE 1 TABLET BY MOUTH EVERY DAY IN THE EVENING 90 tablet 1    triamcinolone (KENALOG) 0.1 % cream PLEASE SEE ATTACHED FOR DETAILED DIRECTIONS      famotidine (PEPCID) 20 MG tablet Take 1 tablet by mouth daily as needed (acid reflux) 90 tablet 1    mycophenolate (CELLCEPT) 500 MG tablet 1 tablet 2 times daily      thiamine 100 MG tablet Take 1 tablet by mouth daily      magnesium oxide (MAG-OX) 400 MG tablet Take 1 tablet by mouth daily (Patient not taking: Reported on 7/3/2024)       No current facility-administered medications on file prior to visit.       OBJECTIVE:    VS:   Vitals:    07/03/24 0952   BP: 115/83   Site: Right Upper Arm   Position: Sitting   Cuff Size: Large Adult   Pulse: 66   Resp: 18   Temp: 97.3 °F (36.3 °C)   TempSrc: Temporal   SpO2: 96%   Weight: 92.5 kg (204 lb)   Height: 1.753 m (5' 9\")     Physical Exam   Lungs:  CTA B  Neck:   No carotid bruits appreciated B.   CVS:  +s1/s2 without m/g/r appreciated.    Abd:  + BS, NTND, No renal or aortic bruits   Extr:  2+ DP/PT pulses B, no pitting edema    RTC:  Return in about 6 months (around 1/3/2025).    Denise Angulo MD   7/5/2024 12:14 PM

## 2024-07-03 ENCOUNTER — OFFICE VISIT (OUTPATIENT)
Dept: INTERNAL MEDICINE | Age: 49
End: 2024-07-03
Payer: COMMERCIAL

## 2024-07-03 ENCOUNTER — PATIENT MESSAGE (OUTPATIENT)
Dept: INTERNAL MEDICINE | Age: 49
End: 2024-07-03

## 2024-07-03 VITALS
SYSTOLIC BLOOD PRESSURE: 115 MMHG | WEIGHT: 204 LBS | HEART RATE: 66 BPM | OXYGEN SATURATION: 96 % | RESPIRATION RATE: 18 BRPM | BODY MASS INDEX: 30.21 KG/M2 | HEIGHT: 69 IN | DIASTOLIC BLOOD PRESSURE: 83 MMHG | TEMPERATURE: 97.3 F

## 2024-07-03 DIAGNOSIS — M25.561 CHRONIC PAIN OF RIGHT KNEE: ICD-10-CM

## 2024-07-03 DIAGNOSIS — M25.511 CHRONIC RIGHT SHOULDER PAIN: ICD-10-CM

## 2024-07-03 DIAGNOSIS — R35.0 URINARY FREQUENCY: ICD-10-CM

## 2024-07-03 DIAGNOSIS — M85.80 OSTEOPENIA, UNSPECIFIED LOCATION: Primary | ICD-10-CM

## 2024-07-03 DIAGNOSIS — G89.29 CHRONIC RIGHT SHOULDER PAIN: ICD-10-CM

## 2024-07-03 DIAGNOSIS — G89.29 CHRONIC PAIN OF RIGHT KNEE: ICD-10-CM

## 2024-07-03 PROCEDURE — 3079F DIAST BP 80-89 MM HG: CPT | Performed by: INTERNAL MEDICINE

## 2024-07-03 PROCEDURE — 3074F SYST BP LT 130 MM HG: CPT | Performed by: INTERNAL MEDICINE

## 2024-07-03 PROCEDURE — 81002 URINALYSIS NONAUTO W/O SCOPE: CPT | Performed by: INTERNAL MEDICINE

## 2024-07-03 PROCEDURE — 99214 OFFICE O/P EST MOD 30 MIN: CPT | Performed by: INTERNAL MEDICINE

## 2024-07-03 PROCEDURE — 99213 OFFICE O/P EST LOW 20 MIN: CPT | Performed by: INTERNAL MEDICINE

## 2024-07-03 RX ORDER — TACROLIMUS 1 MG/1
3 CAPSULE ORAL 2 TIMES DAILY
COMMUNITY
Start: 2024-06-18

## 2024-07-03 SDOH — ECONOMIC STABILITY: FOOD INSECURITY: WITHIN THE PAST 12 MONTHS, YOU WORRIED THAT YOUR FOOD WOULD RUN OUT BEFORE YOU GOT MONEY TO BUY MORE.: NEVER TRUE

## 2024-07-03 SDOH — ECONOMIC STABILITY: FOOD INSECURITY: WITHIN THE PAST 12 MONTHS, THE FOOD YOU BOUGHT JUST DIDN'T LAST AND YOU DIDN'T HAVE MONEY TO GET MORE.: NEVER TRUE

## 2024-07-03 SDOH — ECONOMIC STABILITY: INCOME INSECURITY: HOW HARD IS IT FOR YOU TO PAY FOR THE VERY BASICS LIKE FOOD, HOUSING, MEDICAL CARE, AND HEATING?: NOT HARD AT ALL

## 2024-07-03 ASSESSMENT — PATIENT HEALTH QUESTIONNAIRE - PHQ9
SUM OF ALL RESPONSES TO PHQ QUESTIONS 1-9: 0
SUM OF ALL RESPONSES TO PHQ QUESTIONS 1-9: 0
2. FEELING DOWN, DEPRESSED OR HOPELESS: NOT AT ALL
1. LITTLE INTEREST OR PLEASURE IN DOING THINGS: NOT AT ALL
SUM OF ALL RESPONSES TO PHQ QUESTIONS 1-9: 0
SUM OF ALL RESPONSES TO PHQ QUESTIONS 1-9: 0
SUM OF ALL RESPONSES TO PHQ9 QUESTIONS 1 & 2: 0

## 2024-07-10 LAB
BASOPHILS ABSOLUTE: 41 /ΜL
BASOPHILS RELATIVE PERCENT: 0.9 %
EOSINOPHILS ABSOLUTE: 90 /ΜL
EOSINOPHILS RELATIVE PERCENT: 2 %
HCT VFR BLD CALC: 44.9 % (ref 41–53)
HEMOGLOBIN: 15.2 G/DL (ref 13.5–17.5)
LYMPHOCYTES ABSOLUTE: 1652 /ΜL
LYMPHOCYTES RELATIVE PERCENT: 36.7 %
MCH RBC QN AUTO: 29.3 PG
MCHC RBC AUTO-ENTMCNC: 33.9 G/DL
MCV RBC AUTO: 86.5 FL
MONOCYTES ABSOLUTE: 450 /ΜL
MONOCYTES RELATIVE PERCENT: 10 %
NEUTROPHILS ABSOLUTE: 2268 /ΜL
NEUTROPHILS RELATIVE PERCENT: 50.4 %
PLATELET # BLD: 169 K/ΜL
PMV BLD AUTO: 10.1 FL
RBC # BLD: 5.19 10^6/ΜL
WBC # BLD: 4.5 10^3/ML

## 2024-07-11 LAB
ALBUMIN: 4.7 G/DL
ALP BLD-CCNC: 66 U/L
ALT SERPL-CCNC: 14 U/L
ANION GAP SERPL CALCULATED.3IONS-SCNC: ABNORMAL MMOL/L
AST SERPL-CCNC: 24 U/L
BILIRUB SERPL-MCNC: 0.6 MG/DL (ref 0.1–1.4)
BUN BLDV-MCNC: 27 MG/DL
CALCIUM SERPL-MCNC: 9.5 MG/DL
CHLORIDE BLD-SCNC: 103 MMOL/L
CO2: 27 MMOL/L
CREAT SERPL-MCNC: 1.5 MG/DL
GFR, ESTIMATED: 58
GLUCOSE BLD-MCNC: 91 MG/DL
MAGNESIUM: 1.9 MG/DL
POTASSIUM SERPL-SCNC: 4.3 MMOL/L
PROSTATE SPECIFIC ANTIGEN: 0.85 NG/ML
SODIUM BLD-SCNC: 136 MMOL/L
TOTAL PROTEIN: 7.6 G/DL (ref 6.4–8.2)

## 2024-07-22 RX ORDER — AMLODIPINE BESYLATE 10 MG/1
10 TABLET ORAL EVERY EVENING
Qty: 90 TABLET | Refills: 1 | Status: SHIPPED | OUTPATIENT
Start: 2024-07-22

## 2024-08-01 LAB
ALBUMIN: 4.5 G/DL
ALP BLD-CCNC: 63 U/L
ALT SERPL-CCNC: 11 U/L
ANION GAP SERPL CALCULATED.3IONS-SCNC: NORMAL MMOL/L
AST SERPL-CCNC: 21 U/L
BASOPHILS ABSOLUTE: 38 /ΜL
BASOPHILS RELATIVE PERCENT: 0.9 %
BILIRUB SERPL-MCNC: 0.4 MG/DL (ref 0.1–1.4)
BUN BLDV-MCNC: 24 MG/DL
CALCIUM SERPL-MCNC: 9.4 MG/DL
CHLORIDE BLD-SCNC: 103 MMOL/L
CO2: 28 MMOL/L
CREAT SERPL-MCNC: 1.24 MG/DL
EOSINOPHILS ABSOLUTE: 71 /ΜL
EOSINOPHILS RELATIVE PERCENT: 1.7 %
GFR, ESTIMATED: 73
GLUCOSE BLD-MCNC: 93 MG/DL
HCT VFR BLD CALC: 15.3 % (ref 41–53)
HEMOGLOBIN: 15.3 G/DL (ref 13.5–17.5)
LYMPHOCYTES ABSOLUTE: 1361 /ΜL
LYMPHOCYTES RELATIVE PERCENT: 32.4 %
MAGNESIUM: 2 MG/DL
MCH RBC QN AUTO: 29.5 PG
MCHC RBC AUTO-ENTMCNC: 29.5 G/DL
MCV RBC AUTO: 89.6 FL
MONOCYTES ABSOLUTE: 399 /ΜL
MONOCYTES RELATIVE PERCENT: 9.5 %
NEUTROPHILS ABSOLUTE: 2331 /ΜL
NEUTROPHILS RELATIVE PERCENT: 55.5 %
PLATELET # BLD: 176 K/ΜL
PMV BLD AUTO: 9.9 FL
POTASSIUM SERPL-SCNC: 4.7 MMOL/L
RBC # BLD: 5.18 10^6/ΜL
SODIUM BLD-SCNC: 137 MMOL/L
TOTAL PROTEIN: 7.2 G/DL (ref 6.4–8.2)
WBC # BLD: 4.2 10^3/ML

## 2024-08-14 ENCOUNTER — OFFICE VISIT (OUTPATIENT)
Dept: ORTHOPEDIC SURGERY | Age: 49
End: 2024-08-14

## 2024-08-14 VITALS — HEIGHT: 69 IN | WEIGHT: 204 LBS | BODY MASS INDEX: 30.21 KG/M2

## 2024-08-14 DIAGNOSIS — G89.29 CHRONIC RIGHT SHOULDER PAIN: Primary | ICD-10-CM

## 2024-08-14 DIAGNOSIS — S83.241A TEAR OF MEDIAL MENISCUS OF RIGHT KNEE, CURRENT, UNSPECIFIED TEAR TYPE, INITIAL ENCOUNTER: ICD-10-CM

## 2024-08-14 DIAGNOSIS — M19.011 PRIMARY OSTEOARTHRITIS OF RIGHT SHOULDER: ICD-10-CM

## 2024-08-14 DIAGNOSIS — M25.511 CHRONIC RIGHT SHOULDER PAIN: Primary | ICD-10-CM

## 2024-08-14 DIAGNOSIS — G89.29 CHRONIC PAIN OF RIGHT KNEE: ICD-10-CM

## 2024-08-14 DIAGNOSIS — M25.561 CHRONIC PAIN OF RIGHT KNEE: ICD-10-CM

## 2024-08-14 LAB
ALBUMIN: 4.5 G/DL
ALP BLD-CCNC: 64 U/L
ALT SERPL-CCNC: 11 U/L
ANION GAP SERPL CALCULATED.3IONS-SCNC: ABNORMAL MMOL/L
AST SERPL-CCNC: 18 U/L
BASOPHILS ABSOLUTE: 29 /ΜL
BASOPHILS RELATIVE PERCENT: 0.6 %
BILIRUB SERPL-MCNC: 0.4 MG/DL (ref 0.1–1.4)
BUN BLDV-MCNC: 24 MG/DL
CALCIUM SERPL-MCNC: 9.2 MG/DL
CHLORIDE BLD-SCNC: 104 MMOL/L
CO2: 28 MMOL/L
CREAT SERPL-MCNC: 1.31 MG/DL
EOSINOPHILS ABSOLUTE: 72 /ΜL
EOSINOPHILS RELATIVE PERCENT: 1.5 %
GFR, ESTIMATED: 68
GLUCOSE BLD-MCNC: 88 MG/DL
HCT VFR BLD CALC: 45.8 % (ref 41–53)
HEMOGLOBIN: 15.3 G/DL (ref 13.5–17.5)
LYMPHOCYTES ABSOLUTE: 1608 /ΜL
LYMPHOCYTES RELATIVE PERCENT: 33.5 %
MAGNESIUM: 2 MG/DL
MCH RBC QN AUTO: 29.5 PG
MCHC RBC AUTO-ENTMCNC: 33.4 G/DL
MCV RBC AUTO: 88.2 FL
MONOCYTES ABSOLUTE: 499 /ΜL
MONOCYTES RELATIVE PERCENT: 10.4 %
NEUTROPHILS ABSOLUTE: 2592 /ΜL
NEUTROPHILS RELATIVE PERCENT: 54 %
PLATELET # BLD: 166 K/ΜL
PMV BLD AUTO: 9.8 FL
POTASSIUM SERPL-SCNC: 4.5 MMOL/L
RBC # BLD: 5.19 10^6/ΜL
SODIUM BLD-SCNC: 140 MMOL/L
TOTAL PROTEIN: 7.3 G/DL (ref 6.4–8.2)
WBC # BLD: 4.8 10^3/ML

## 2024-08-14 RX ORDER — TRIAMCINOLONE ACETONIDE 40 MG/ML
40 INJECTION, SUSPENSION INTRA-ARTICULAR; INTRAMUSCULAR ONCE
Status: COMPLETED | OUTPATIENT
Start: 2024-08-14 | End: 2024-08-14

## 2024-08-14 RX ORDER — LIDOCAINE HYDROCHLORIDE 10 MG/ML
5 INJECTION, SOLUTION INFILTRATION; PERINEURAL ONCE
Status: COMPLETED | OUTPATIENT
Start: 2024-08-14 | End: 2024-08-14

## 2024-08-14 RX ORDER — LIDOCAINE HYDROCHLORIDE 10 MG/ML
3 INJECTION, SOLUTION INFILTRATION; PERINEURAL ONCE
Status: COMPLETED | OUTPATIENT
Start: 2024-08-14 | End: 2024-08-14

## 2024-08-14 RX ADMIN — TRIAMCINOLONE ACETONIDE 40 MG: 40 INJECTION, SUSPENSION INTRA-ARTICULAR; INTRAMUSCULAR at 16:37

## 2024-08-14 RX ADMIN — LIDOCAINE HYDROCHLORIDE 3 ML: 10 INJECTION, SOLUTION INFILTRATION; PERINEURAL at 16:35

## 2024-08-14 RX ADMIN — LIDOCAINE HYDROCHLORIDE 5 ML: 10 INJECTION, SOLUTION INFILTRATION; PERINEURAL at 16:36

## 2024-08-14 RX ADMIN — TRIAMCINOLONE ACETONIDE 40 MG: 40 INJECTION, SUSPENSION INTRA-ARTICULAR; INTRAMUSCULAR at 16:36

## 2024-08-14 NOTE — PROGRESS NOTES
PROCEDURE NOTE:    DIAGNOSIS      RIGHT shoulder pain.    PROCEDURE     Ultrasound-guided RIGHT glenohumeral joint corticosteroid injection.     PROCEDURAL PAUSE     Procedural pause conducted to verify: ?correct patient identity, procedure to be performed, and as applicable, correct side and site, correct patient position, and availability of implants, special equipment, or special requirements.     PROCEDURE DETAILS     The procedure was carried out under sterile technique.      Patient Position: ?Sidelying with the painful shoulder facing upwards.     Localization Process: ?The glenohumeral joint was evaluated under ultrasound prior to starting the procedure. ?The skin was prepped with Betadine and Alcohol.    Approach: ?In-plane.     Local Anesthesia: ?Local anesthesia was obtained with vapocoolant cold spray and 1 cc of 1% lidocaine using a 30-gauge 1-1/4-inch needle to create a skin wheal. ?     Injection/Aspiration: ?A 25-gauge 2-inch needle was advanced from an in-plane, medial to lateral approach into the posterior glenohumeral joint. ?After visualization of the needle tip in the target area and negative aspiration for blood, a mixture of 3 cc of 1% lidocaine and 1 cc of Kenalog (40 mg/cc) was injected into the glenohumeral joint with excellent sonographic flow. Images of procedure were permanently recorded.    Postprocedure Care: ?The patient will avoid heavy exertion with the shoulder and avoid soaking the shoulder under water for two days. ?The patient will contact me with any problems related to the injection.     PATIENT EDUCATION     Ready to learn, no apparent learning barriers were identified; learning preferences include listening. ?Explained diagnosis and treatment plan; patient expressed understanding of the content.     INFORMED CONSENT     Discussed the risks, benefits, alternatives, and the necessity of other members of the healthcare team participating in the procedure. ?All questions 
Cirrhosis of liver (HCC)     Kidney failure     States once received liver transplant kidneys are no longer in failure     Past Surgical History:   Procedure Laterality Date    COLONOSCOPY  06/01/2023    Multiple small to large uncomplicated sigmoid diverticula from 20 to 40 cm from anus, Dr. Mark, [facility]    COLONOSCOPY N/A 6/1/2023    COLORECTAL CANCER SCREENING, NOT HIGH RISK performed by Hernán Mark MD at [facility]    IR TIPS INSERTION  02/07/2022    IR TIPS INSERTION 2/7/2022 Dariel Middleton II, MD [facility] PROCED[facility]    LIVER TRANSPLANT  03/22/2022    [place], [place]    UPPER GASTROINTESTINAL ENDOSCOPY N/A 02/07/2022    EGD ESOPHAGOGASTRODUODENOSCOPY ESOPHAGEAL BANDING, , INSERTION OF SENGSTAKEN-BLAKEMORE TUBE performed by Angelito Haas MD at [facility] OR       Allergies :   No Known Allergies    Medication List :    Current Outpatient Medications   Medication Sig Dispense Refill    amLODIPine (NORVASC) 10 MG tablet TAKE 1 TABLET BY MOUTH EVERY DAY IN THE EVENING 90 tablet 1    tacrolimus (PROGRAF) 1 MG capsule Take 3 capsules by mouth 2 times daily      triamcinolone (KENALOG) 0.1 % cream PLEASE SEE ATTACHED FOR DETAILED DIRECTIONS      famotidine (PEPCID) 20 MG tablet Take 1 tablet by mouth daily as needed (acid reflux) 90 tablet 1    mycophenolate (CELLCEPT) 500 MG tablet 1 tablet 2 times daily      magnesium oxide (MAG-OX) 400 MG tablet Take 1 tablet by mouth daily      thiamine 100 MG tablet Take 1 tablet by mouth daily       No current facility-administered medications for this visit.      ______________________________________________________________________    Physical Exam :    Vitals: Ht 1.753 m (5' 9\")   Wt 92.5 kg (204 lb)   BMI 30.13 kg/m²   General Appearance: Nontoxic, awake, alert, and in no acute distress.  Chest wall/Lung: Respirations regular and unlabored. No cyanosis.  Heart: RRR, distal pulses intact.  Neurologic: Alert&Oriented x3. Sensation grossly intact. No focal motor

## 2024-08-30 LAB
ALBUMIN: 4.4 G/DL
ALP BLD-CCNC: 58 U/L
ALT SERPL-CCNC: 13 U/L
ANION GAP SERPL CALCULATED.3IONS-SCNC: NORMAL MMOL/L
AST SERPL-CCNC: 16 U/L
BASOPHILS ABSOLUTE: 39 /ΜL
BASOPHILS RELATIVE PERCENT: 0.7 %
BILIRUB SERPL-MCNC: 0.5 MG/DL (ref 0.1–1.4)
BUN BLDV-MCNC: 25 MG/DL
CALCIUM SERPL-MCNC: 9 MG/DL
CHLORIDE BLD-SCNC: 105 MMOL/L
CO2: 25 MMOL/L
CREAT SERPL-MCNC: 1.21 MG/DL
EOSINOPHILS ABSOLUTE: 61 /ΜL
EOSINOPHILS RELATIVE PERCENT: 1.1 %
GFR, ESTIMATED: 75
GLUCOSE BLD-MCNC: 84 MG/DL
HCT VFR BLD CALC: 45.2 % (ref 41–53)
HEMOGLOBIN: 15.1 G/DL (ref 13.5–17.5)
LYMPHOCYTES ABSOLUTE: 1529 /ΜL
LYMPHOCYTES RELATIVE PERCENT: 27.8 %
MAGNESIUM: 1.9 MG/DL
MCH RBC QN AUTO: 29.5 PG
MCHC RBC AUTO-ENTMCNC: 33.4 G/DL
MCV RBC AUTO: 88.3 FL
MONOCYTES ABSOLUTE: 451 /ΜL
MONOCYTES RELATIVE PERCENT: 8.2 %
NEUTROPHILS ABSOLUTE: 3421 /ΜL
NEUTROPHILS RELATIVE PERCENT: 62.2 %
PLATELET # BLD: 188 K/ΜL
PMV BLD AUTO: 9.9 FL
POTASSIUM SERPL-SCNC: 4.3 MMOL/L
RBC # BLD: 5.12 10^6/ΜL
SODIUM BLD-SCNC: 139 MMOL/L
TOTAL PROTEIN: 6.9 G/DL (ref 6.4–8.2)
WBC # BLD: 5.5 10^3/ML

## 2024-10-09 RX ORDER — FAMOTIDINE 20 MG/1
20 TABLET, FILM COATED ORAL DAILY PRN
Qty: 90 TABLET | Refills: 1 | Status: SHIPPED | OUTPATIENT
Start: 2024-10-09

## 2024-10-09 NOTE — TELEPHONE ENCOUNTER
Name of Medication(s) Requested:  Requested Prescriptions     Pending Prescriptions Disp Refills    famotidine (PEPCID) 20 MG tablet [Pharmacy Med Name: FAMOTIDINE 20 MG TABLET] 30 tablet 5     Sig: TAKE 1 TABLET BY MOUTH DAILY AS NEEDED (ACID REFLUX)       Medication is on current medication list Yes    Dosage and directions were verified? Yes    Quantity verified: 90 day supply     Pharmacy Verified?  Yes    Last Appointment:  7/3/2024    Future appts:  No future appointments.     (If no appt send self scheduling link. .REFILLAPPT) Message sent on 7-3-24 of need to schedule 6 month follow up appointment  Scheduling request sent?     [x] Yes  [] No    Does patient need updated?  [] Yes  [x] No

## 2024-12-11 LAB
ALBUMIN: 4.5 G/DL
ALP BLD-CCNC: 69 U/L
ALT SERPL-CCNC: 11 U/L
ANION GAP SERPL CALCULATED.3IONS-SCNC: ABNORMAL MMOL/L
AST SERPL-CCNC: 18 U/L
BASOPHILS ABSOLUTE: 22 /ΜL
BASOPHILS RELATIVE PERCENT: 0.5 %
BILIRUB SERPL-MCNC: 0.3 MG/DL (ref 0.1–1.4)
BUN BLDV-MCNC: 18 MG/DL
CALCIUM SERPL-MCNC: 9.1 MG/DL
CHLORIDE BLD-SCNC: 104 MMOL/L
CO2: 28 MMOL/L
CREAT SERPL-MCNC: 1.41 MG/DL
EOSINOPHILS ABSOLUTE: 79 /ΜL
EOSINOPHILS RELATIVE PERCENT: 1.8 %
GFR, ESTIMATED: 62
GLUCOSE BLD-MCNC: 99 MG/DL
HCT VFR BLD CALC: 43.8 % (ref 41–53)
HEMOGLOBIN: 14.3 G/DL (ref 13.5–17.5)
LYMPHOCYTES ABSOLUTE: 1465 /ΜL
LYMPHOCYTES RELATIVE PERCENT: 33.3 %
MAGNESIUM: 1.9 MG/DL
MCH RBC QN AUTO: 29.1 PG
MCHC RBC AUTO-ENTMCNC: 32.6 G/DL
MCV RBC AUTO: 89 FL
MONOCYTES ABSOLUTE: 361 /ΜL
MONOCYTES RELATIVE PERCENT: 8.2 %
NEUTROPHILS ABSOLUTE: 2473 /ΜL
NEUTROPHILS RELATIVE PERCENT: 56.2 %
PLATELET # BLD: 186 K/ΜL
PMV BLD AUTO: 9.7 FL
POTASSIUM SERPL-SCNC: 4.5 MMOL/L
RBC # BLD: 4.92 10^6/ΜL
SODIUM BLD-SCNC: 140 MMOL/L
TOTAL PROTEIN: 6.8 G/DL (ref 6.4–8.2)
WBC # BLD: 4.4 10^3/ML

## 2025-01-15 ENCOUNTER — OFFICE VISIT (OUTPATIENT)
Dept: ORTHOPEDIC SURGERY | Age: 50
End: 2025-01-15

## 2025-01-15 VITALS
OXYGEN SATURATION: 96 % | DIASTOLIC BLOOD PRESSURE: 86 MMHG | HEART RATE: 69 BPM | HEIGHT: 69 IN | TEMPERATURE: 97.8 F | SYSTOLIC BLOOD PRESSURE: 128 MMHG | WEIGHT: 201 LBS | BODY MASS INDEX: 29.77 KG/M2 | RESPIRATION RATE: 18 BRPM

## 2025-01-15 DIAGNOSIS — G89.29 CHRONIC RIGHT SHOULDER PAIN: ICD-10-CM

## 2025-01-15 DIAGNOSIS — M25.561 CHRONIC PAIN OF RIGHT KNEE: ICD-10-CM

## 2025-01-15 DIAGNOSIS — M19.011 PRIMARY OSTEOARTHRITIS OF RIGHT SHOULDER: Primary | ICD-10-CM

## 2025-01-15 DIAGNOSIS — G89.29 CHRONIC PAIN OF RIGHT KNEE: ICD-10-CM

## 2025-01-15 DIAGNOSIS — M25.511 CHRONIC RIGHT SHOULDER PAIN: ICD-10-CM

## 2025-01-15 RX ORDER — TRIAMCINOLONE ACETONIDE 40 MG/ML
40 INJECTION, SUSPENSION INTRA-ARTICULAR; INTRAMUSCULAR ONCE
Status: COMPLETED | OUTPATIENT
Start: 2025-01-15 | End: 2025-01-15

## 2025-01-15 RX ORDER — LIDOCAINE HYDROCHLORIDE 10 MG/ML
3 INJECTION, SOLUTION INFILTRATION; PERINEURAL ONCE
Status: COMPLETED | OUTPATIENT
Start: 2025-01-15 | End: 2025-01-15

## 2025-01-15 RX ORDER — LIDOCAINE HYDROCHLORIDE 10 MG/ML
5 INJECTION, SOLUTION INFILTRATION; PERINEURAL ONCE
Status: COMPLETED | OUTPATIENT
Start: 2025-01-15 | End: 2025-01-15

## 2025-01-15 RX ADMIN — LIDOCAINE HYDROCHLORIDE 5 ML: 10 INJECTION, SOLUTION INFILTRATION; PERINEURAL at 16:35

## 2025-01-15 RX ADMIN — LIDOCAINE HYDROCHLORIDE 3 ML: 10 INJECTION, SOLUTION INFILTRATION; PERINEURAL at 16:35

## 2025-01-15 RX ADMIN — TRIAMCINOLONE ACETONIDE 40 MG: 40 INJECTION, SUSPENSION INTRA-ARTICULAR; INTRAMUSCULAR at 16:36

## 2025-01-15 RX ADMIN — TRIAMCINOLONE ACETONIDE 40 MG: 40 INJECTION, SUSPENSION INTRA-ARTICULAR; INTRAMUSCULAR at 16:35

## 2025-01-15 NOTE — PROGRESS NOTES
[facility]  PRIMARY CARE SPORTS MEDICINE  DATE OF VISIT : 1/15/2025    Patient : Joselo Chambers  Age : 49 y.o.   : 1975  MRN : [id number]   ______________________________________________________________________    Chief Complaint :   Chief Complaint   Patient presents with    Follow-up     Right knee last csi   just started   going down stairs and running  is hard    Right shoulder    lasted till mid  maybe   helped but not 100 percent       HPI : Joselo Chambers is 49 y.o. male who presented to the clinic today for follow-up of right knee pain and repeat intra-articular corticosteroid injection for the right shoulder.    Right Knee Pain - Patient reports improvement of symptoms following corticosteroid injection on 2024.  Patient reports intermittent exacerbations of pain mainly with weightbearing activity.  Patient states symptoms have gradually over the past few weeks with no new mechanism of injury.    Past Medical History :  Past Medical History:   Diagnosis Date    Cirrhosis of liver (HCC)     Kidney failure     States once received liver transplant kidneys are no longer in failure     Past Surgical History:   Procedure Laterality Date    COLONOSCOPY  2023    Multiple small to large uncomplicated sigmoid diverticula from 20 to 40 cm from anus, Dr. Mark, [facility]    COLONOSCOPY N/A 2023    COLORECTAL CANCER SCREENING, NOT HIGH RISK performed by Hernán Mark MD at [facility] ENDOSCOPY    IR TIPS INSERTION  2022    IR TIPS INSERTION 2022 Dariel Middleton II, MD [facility] SPECIAL PROCEDURES    LIVER TRANSPLANT  2022    [place], [place]    UPPER GASTROINTESTINAL ENDOSCOPY N/A 2022    EGD ESOPHAGOGASTRODUODENOSCOPY ESOPHAGEAL BANDING, , INSERTION OF SENGSTAKEN-BLAKEMORE TUBE performed by Angelito Haas MD at [facility] OR       Allergies :   No Known Allergies    Medication List :    Current Outpatient Medications   Medication Sig Dispense Refill    
answered and consent given.     Following denial of allergy and review of potential side effects and complications including but not necessarily limited to infection, allergic reaction, local tissue breakdown, aseptic effusion potentially necessitating aspiration and corticosteroid injection, elevation of blood glucose, injury to soft tissue and/or nerves, and seizure, the patient indicated their understanding and agreed to proceed.    FOLLOW UP    Follow up in 3 months.    Electronically signed by Kenji Guan MD on 1/15/2025 at 5:35 PM

## 2025-01-22 RX ORDER — AMLODIPINE BESYLATE 10 MG/1
10 TABLET ORAL EVERY EVENING
Qty: 90 TABLET | Refills: 1 | Status: SHIPPED | OUTPATIENT
Start: 2025-01-22

## 2025-01-22 NOTE — TELEPHONE ENCOUNTER
Name of Medication(s) Requested:  Requested Prescriptions     Pending Prescriptions Disp Refills    amLODIPine (NORVASC) 10 MG tablet [Pharmacy Med Name: AMLODIPINE BESYLATE 10 MG TAB] 90 tablet 1     Sig: TAKE 1 TABLET BY MOUTH EVERY DAY IN THE EVENING       Medication is on current medication list Yes    Dosage and directions were verified? Yes    Quantity verified: 90 day supply     Pharmacy Verified?  Yes    Last Appointment:  7/3/2024    Future appts:  No future appointments.     (If no appt send self scheduling link. .REFILLAPPT)  Scheduling request sent?     [] Yes  [x] No    Does patient need updated?  [] Yes  [x] No

## 2025-04-16 RX ORDER — FAMOTIDINE 20 MG/1
TABLET, FILM COATED ORAL
Qty: 90 TABLET | Refills: 0 | Status: SHIPPED | OUTPATIENT
Start: 2025-04-16

## 2025-04-16 NOTE — TELEPHONE ENCOUNTER
Name of Medication(s) Requested:  Requested Prescriptions     Pending Prescriptions Disp Refills    famotidine (PEPCID) 20 MG tablet [Pharmacy Med Name: FAMOTIDINE 20 MG TABLET] 90 tablet 1     Sig: TAKE 1 TABLET BY MOUTH EVERY DAY AS NEEDED (ACID REFLUX)       Medication is on current medication list Yes    Dosage and directions were verified? Yes    Quantity verified: 90 day supply     Pharmacy Verified?  Yes    Last Appointment:  7/3/2024    Future appts:  No future appointments.     (If no appt send self scheduling link. .REFILLAPPT)  Scheduling request sent?     [] Yes  [x] No    Does patient need updated?  [] Yes  [x] No

## 2025-07-02 ENCOUNTER — OFFICE VISIT (OUTPATIENT)
Dept: ORTHOPEDIC SURGERY | Age: 50
End: 2025-07-02

## 2025-07-02 VITALS — BODY MASS INDEX: 29.77 KG/M2 | HEIGHT: 69 IN | WEIGHT: 201 LBS

## 2025-07-02 DIAGNOSIS — M25.511 CHRONIC RIGHT SHOULDER PAIN: ICD-10-CM

## 2025-07-02 DIAGNOSIS — G89.29 CHRONIC PAIN OF RIGHT KNEE: ICD-10-CM

## 2025-07-02 DIAGNOSIS — M19.011 PRIMARY OSTEOARTHRITIS OF RIGHT SHOULDER: Primary | ICD-10-CM

## 2025-07-02 DIAGNOSIS — M25.561 CHRONIC PAIN OF RIGHT KNEE: ICD-10-CM

## 2025-07-02 DIAGNOSIS — G89.29 CHRONIC RIGHT SHOULDER PAIN: ICD-10-CM

## 2025-07-02 RX ORDER — BUPIVACAINE HYDROCHLORIDE 2.5 MG/ML
2 INJECTION, SOLUTION INFILTRATION; PERINEURAL ONCE
Status: COMPLETED | OUTPATIENT
Start: 2025-07-02 | End: 2025-07-02

## 2025-07-02 RX ORDER — TRIAMCINOLONE ACETONIDE 40 MG/ML
40 INJECTION, SUSPENSION INTRA-ARTICULAR; INTRAMUSCULAR ONCE
Status: COMPLETED | OUTPATIENT
Start: 2025-07-02 | End: 2025-07-02

## 2025-07-02 RX ORDER — BUPIVACAINE HYDROCHLORIDE 2.5 MG/ML
5 INJECTION, SOLUTION INFILTRATION; PERINEURAL ONCE
Status: COMPLETED | OUTPATIENT
Start: 2025-07-02 | End: 2025-07-02

## 2025-07-02 RX ADMIN — TRIAMCINOLONE ACETONIDE 40 MG: 40 INJECTION, SUSPENSION INTRA-ARTICULAR; INTRAMUSCULAR at 16:07

## 2025-07-02 RX ADMIN — TRIAMCINOLONE ACETONIDE 40 MG: 40 INJECTION, SUSPENSION INTRA-ARTICULAR; INTRAMUSCULAR at 16:06

## 2025-07-02 RX ADMIN — BUPIVACAINE HYDROCHLORIDE 5 MG: 2.5 INJECTION, SOLUTION INFILTRATION; PERINEURAL at 16:06

## 2025-07-02 RX ADMIN — BUPIVACAINE HYDROCHLORIDE 12.5 MG: 2.5 INJECTION, SOLUTION INFILTRATION; PERINEURAL at 16:05

## 2025-07-03 NOTE — PROGRESS NOTES
PROCEDURE NOTE:    DIAGNOSIS      RIGHT shoulder pain    PROCEDURE     Ultrasound-guided RIGHT glenohumeral joint corticosteroid injection.     PROCEDURAL PAUSE     Procedural pause conducted to verify: ?correct patient identity, procedure to be performed, and as applicable, correct side and site, correct patient position, and availability of implants, special equipment, or special requirements.     PROCEDURE DETAILS     The procedure was carried out under sterile technique.      Patient Position: ?Sidelying with the painful shoulder facing upwards.     Localization Process: ?The glenohumeral joint was evaluated under ultrasound prior to starting the procedure. ?The skin was prepped with Betadine and Alcohol.    Approach: ?In-plane.     Local Anesthesia: ?Local anesthesia was obtained with vapocoolant cold spray and 1 cc of 1% lidocaine using a 30-gauge 1-1/4-inch needle to create a skin wheal. ?     Injection/Aspiration: ?A 25-gauge 2-inch needle was advanced from an in-plane, medial to lateral approach into the posterior glenohumeral joint. ?After visualization of the needle tip in the target area and negative aspiration for blood, a mixture of 3 cc of 1% lidocaine and 1 cc of Kenalog (40 mg/cc) was injected into the glenohumeral joint with excellent sonographic flow. Images of procedure were permanently recorded.    Postprocedure Care: ?The patient will avoid heavy exertion with the shoulder and avoid soaking the shoulder under water for two days. ?The patient will contact me with any problems related to the injection.     PATIENT EDUCATION     Ready to learn, no apparent learning barriers were identified; learning preferences include listening. ?Explained diagnosis and treatment plan; patient expressed understanding of the content.     INFORMED CONSENT     Discussed the risks, benefits, alternatives, and the necessity of other members of the healthcare team participating in the procedure. ?All questions answered

## 2025-07-03 NOTE — PROGRESS NOTES
[facility]  PRIMARY CARE SPORTS MEDICINE  DATE OF VISIT : 2025     Patient : Joselo Chambers  Age : 49 y.o.   : 1975  MRN : [id number]   ______________________________________________________________________    Chief Complaint :   Chief Complaint   Patient presents with    Follow-up     Rt knee and rt shoulder  last csi  1/15/25  lasted till 2 weeks ago         HPI : Joselo Chambers is 49 y.o. male who presented to the clinic today for follow-up of right knee pain and repeat intra-articular corticosteroid injection for the right shoulder.    Right Knee Pain - Patient reports improvement of symptoms following corticosteroid injection on 2024.  Patient reports intermittent exacerbations of pain mainly with weightbearing activity.  Patient states symptoms have gradually over the past few weeks with no new mechanism of injury.    Past Medical History :  Past Medical History:   Diagnosis Date    Cirrhosis of liver (HCC)     Kidney failure     States once received liver transplant kidneys are no longer in failure     Past Surgical History:   Procedure Laterality Date    COLONOSCOPY  2023    Multiple small to large uncomplicated sigmoid diverticula from 20 to 40 cm from anus, Dr. Mark, [facility]    COLONOSCOPY N/A 2023    COLORECTAL CANCER SCREENING, NOT HIGH RISK performed by Hernán Mark MD at [facility]    IR TIPS INSERTION  2022    IR TIPS INSERTION 2022 Dariel Middleton II, MD [facility]    LIVER TRANSPLANT  2022    [place], [place]    UPPER GASTROINTESTINAL ENDOSCOPY N/A 2022    EGD ESOPHAGOGASTRODUODENOSCOPY ESOPHAGEAL BANDING, , INSERTION OF SENGSTAKEN-BLAKEMORE TUBE performed by Angelito Haas MD at [facility] OR       Allergies :   No Known Allergies    Medication List :    Current Outpatient Medications   Medication Sig Dispense Refill    famotidine (PEPCID) 20 MG tablet TAKE 1 TABLET BY MOUTH EVERY DAY AS NEEDED (ACID REFLUX) 90 tablet 0

## 2025-07-21 RX ORDER — AMLODIPINE BESYLATE 10 MG/1
10 TABLET ORAL EVERY EVENING
Qty: 30 TABLET | Refills: 1 | Status: SHIPPED | OUTPATIENT
Start: 2025-07-21

## (undated) DEVICE — CONNECTOR IRRIGATION AUXILIARY H2O JET W/ PRT MTL THRD HYDR

## (undated) DEVICE — MULTIPLE BAND LIGATOR: Brand: SPEEDBAND SUPERVIEW SUPER 7

## (undated) DEVICE — GOWN,SIRUS,FABRNF,L,20/CS: Brand: MEDLINE

## (undated) DEVICE — TOTAL TRAY, 16FR 10ML SIL FOLEY, URN: Brand: MEDLINE

## (undated) DEVICE — GAUZE,SPONGE,4"X4",8PLY,STRL,LF,10/TRAY: Brand: MEDLINE

## (undated) DEVICE — BLOCK BITE 60FR CAREGUARD

## (undated) DEVICE — GOWN,SIRUS,FABRNF,XL,20/CS: Brand: MEDLINE

## (undated) DEVICE — Device

## (undated) DEVICE — DEFENDO AIR WATER SUCTION AND BIOPSY VALVE KIT FOR  OLYMPUS: Brand: DEFENDO AIR/WATER/SUCTION AND BIOPSY VALVE